# Patient Record
Sex: FEMALE | Race: WHITE | Employment: OTHER
[De-identification: names, ages, dates, MRNs, and addresses within clinical notes are randomized per-mention and may not be internally consistent; named-entity substitution may affect disease eponyms.]

---

## 2017-01-18 DIAGNOSIS — D50.9 IRON DEFICIENCY ANEMIA, UNSPECIFIED IRON DEFICIENCY ANEMIA TYPE: ICD-10-CM

## 2017-01-18 DIAGNOSIS — I10 ESSENTIAL HYPERTENSION: ICD-10-CM

## 2017-01-18 DIAGNOSIS — N18.30 CKD (CHRONIC KIDNEY DISEASE) STAGE 3, GFR 30-59 ML/MIN (HCC): ICD-10-CM

## 2017-01-18 LAB
BASOPHILS ABSOLUTE: ABNORMAL /ΜL
BASOPHILS RELATIVE PERCENT: ABNORMAL %
BUN BLDV-MCNC: 25 MG/DL
CALCIUM SERPL-MCNC: 10.1 MG/DL
CHLORIDE BLD-SCNC: 104 MMOL/L
CO2: 21 MMOL/L
CREAT SERPL-MCNC: 1.16 MG/DL
EOSINOPHILS ABSOLUTE: ABNORMAL /ΜL
EOSINOPHILS RELATIVE PERCENT: ABNORMAL %
GFR CALCULATED: NORMAL
GLUCOSE BLD-MCNC: 143 MG/DL
HCT VFR BLD CALC: 31.6 % (ref 36–46)
HEMOGLOBIN: 10 G/DL (ref 12–16)
LYMPHOCYTES ABSOLUTE: ABNORMAL /ΜL
LYMPHOCYTES RELATIVE PERCENT: ABNORMAL %
MCH RBC QN AUTO: 26.5 PG
MCHC RBC AUTO-ENTMCNC: 31.6 G/DL
MCV RBC AUTO: 84 FL
MONOCYTES ABSOLUTE: ABNORMAL /ΜL
MONOCYTES RELATIVE PERCENT: ABNORMAL %
NEUTROPHILS ABSOLUTE: ABNORMAL /ΜL
NEUTROPHILS RELATIVE PERCENT: ABNORMAL %
PLATELET # BLD: 487 K/ΜL
PMV BLD AUTO: 7.9 FL
POTASSIUM SERPL-SCNC: 4.5 MMOL/L
RBC # BLD: ABNORMAL 10^6/ΜL
SODIUM BLD-SCNC: 138 MMOL/L
WBC # BLD: 7.9 10^3/ML

## 2017-01-19 ENCOUNTER — TELEPHONE (OUTPATIENT)
Facility: CLINIC | Age: 82
End: 2017-01-19

## 2017-01-19 ENCOUNTER — OFFICE VISIT (OUTPATIENT)
Facility: CLINIC | Age: 82
End: 2017-01-19

## 2017-01-19 VITALS
SYSTOLIC BLOOD PRESSURE: 120 MMHG | HEART RATE: 88 BPM | BODY MASS INDEX: 37.86 KG/M2 | WEIGHT: 207 LBS | OXYGEN SATURATION: 96 % | RESPIRATION RATE: 20 BRPM | DIASTOLIC BLOOD PRESSURE: 74 MMHG

## 2017-01-19 DIAGNOSIS — B37.31 VAGINAL CANDIDIASIS: ICD-10-CM

## 2017-01-19 DIAGNOSIS — F41.9 ANXIETY: ICD-10-CM

## 2017-01-19 DIAGNOSIS — D50.9 IRON DEFICIENCY ANEMIA, UNSPECIFIED IRON DEFICIENCY ANEMIA TYPE: ICD-10-CM

## 2017-01-19 DIAGNOSIS — E11.8 TYPE 2 DIABETES MELLITUS WITH COMPLICATION, WITHOUT LONG-TERM CURRENT USE OF INSULIN (HCC): ICD-10-CM

## 2017-01-19 DIAGNOSIS — E78.00 PURE HYPERCHOLESTEROLEMIA: ICD-10-CM

## 2017-01-19 DIAGNOSIS — R29.898 WEAKNESS OF LEFT LEG: ICD-10-CM

## 2017-01-19 DIAGNOSIS — N39.0 UTI DUE TO KLEBSIELLA SPECIES: ICD-10-CM

## 2017-01-19 DIAGNOSIS — E03.9 HYPOTHYROIDISM, UNSPECIFIED TYPE: ICD-10-CM

## 2017-01-19 DIAGNOSIS — N18.30 CKD (CHRONIC KIDNEY DISEASE) STAGE 3, GFR 30-59 ML/MIN (HCC): ICD-10-CM

## 2017-01-19 DIAGNOSIS — K21.9 GASTROESOPHAGEAL REFLUX DISEASE WITHOUT ESOPHAGITIS: ICD-10-CM

## 2017-01-19 DIAGNOSIS — K44.9 HIATAL HERNIA: ICD-10-CM

## 2017-01-19 DIAGNOSIS — I10 ESSENTIAL HYPERTENSION: ICD-10-CM

## 2017-01-19 DIAGNOSIS — B96.89 UTI DUE TO KLEBSIELLA SPECIES: ICD-10-CM

## 2017-01-19 DIAGNOSIS — I82.4Y3 DEEP VEIN THROMBOSIS (DVT) OF PROXIMAL VEIN OF BOTH LOWER EXTREMITIES, UNSPECIFIED CHRONICITY (HCC): Primary | ICD-10-CM

## 2017-01-19 LAB
APPEARANCE FLUID: ABNORMAL
BILIRUBIN, POC: ABNORMAL
BLOOD URINE, POC: ABNORMAL
CLARITY, POC: CLEAR
COLOR, POC: YELLOW
GLUCOSE URINE, POC: ABNORMAL
KETONES, POC: ABNORMAL
LEUKOCYTE EST, POC: ABNORMAL
NITRITE, POC: ABNORMAL
PH, POC: 5.5
PROTEIN, POC: 30
SPECIFIC GRAVITY, POC: 1.02
UROBILINOGEN, POC: 0.2

## 2017-01-19 PROCEDURE — 81003 URINALYSIS AUTO W/O SCOPE: CPT | Performed by: FAMILY MEDICINE

## 2017-01-19 PROCEDURE — 99214 OFFICE O/P EST MOD 30 MIN: CPT | Performed by: FAMILY MEDICINE

## 2017-01-19 RX ORDER — FLUCONAZOLE 150 MG/1
TABLET ORAL
Qty: 3 TABLET | Refills: 0 | Status: SHIPPED | OUTPATIENT
Start: 2017-01-19 | End: 2017-01-20

## 2017-01-19 RX ORDER — DULOXETIN HYDROCHLORIDE 60 MG/1
60 CAPSULE, DELAYED RELEASE ORAL DAILY
Qty: 30 CAPSULE | Refills: 3 | Status: SHIPPED | OUTPATIENT
Start: 2017-01-19 | End: 2017-05-30 | Stop reason: SDUPTHER

## 2017-01-19 RX ORDER — LEVOFLOXACIN 500 MG/1
500 TABLET, FILM COATED ORAL DAILY
Qty: 7 TABLET | Refills: 0 | Status: SHIPPED | OUTPATIENT
Start: 2017-01-19 | End: 2017-01-26

## 2017-01-19 RX ORDER — APIXABAN 5 MG/1
1 TABLET, FILM COATED ORAL 2 TIMES DAILY
Refills: 0 | COMMUNITY
Start: 2016-12-14 | End: 2017-01-19 | Stop reason: SDUPTHER

## 2017-01-19 ASSESSMENT — ENCOUNTER SYMPTOMS
SORE THROAT: 0
CONSTIPATION: 0
BACK PAIN: 0
SHORTNESS OF BREATH: 0
ABDOMINAL DISTENTION: 0
CHEST TIGHTNESS: 0
COUGH: 0
RHINORRHEA: 0
VOMITING: 0
DIARRHEA: 0
NAUSEA: 0
ABDOMINAL PAIN: 0

## 2017-01-20 ENCOUNTER — TELEPHONE (OUTPATIENT)
Facility: CLINIC | Age: 82
End: 2017-01-20

## 2017-01-23 DIAGNOSIS — I82.431 DEEP VEIN THROMBOSIS (DVT) OF POPLITEAL VEIN OF RIGHT LOWER EXTREMITY, UNSPECIFIED CHRONICITY (HCC): Primary | ICD-10-CM

## 2017-01-23 DIAGNOSIS — N39.0 URINARY TRACT INFECTION, SITE NOT SPECIFIED: ICD-10-CM

## 2017-01-25 ENCOUNTER — TELEPHONE (OUTPATIENT)
Facility: CLINIC | Age: 82
End: 2017-01-25

## 2017-01-25 RX ORDER — CIPROFLOXACIN 500 MG/1
500 TABLET, FILM COATED ORAL 2 TIMES DAILY
Qty: 14 TABLET | Refills: 0 | Status: SHIPPED | OUTPATIENT
Start: 2017-01-25 | End: 2017-02-01

## 2017-01-31 ENCOUNTER — TELEPHONE (OUTPATIENT)
Facility: CLINIC | Age: 82
End: 2017-01-31

## 2017-01-31 DIAGNOSIS — M54.5 LOW BACK PAIN, UNSPECIFIED BACK PAIN LATERALITY, UNSPECIFIED CHRONICITY, WITH SCIATICA PRESENCE UNSPECIFIED: Primary | ICD-10-CM

## 2017-02-02 ENCOUNTER — TELEPHONE (OUTPATIENT)
Facility: CLINIC | Age: 82
End: 2017-02-02

## 2017-02-02 DIAGNOSIS — M54.5 LOW BACK PAIN, UNSPECIFIED BACK PAIN LATERALITY, UNSPECIFIED CHRONICITY, WITH SCIATICA PRESENCE UNSPECIFIED: ICD-10-CM

## 2017-03-07 DIAGNOSIS — N18.30 CKD (CHRONIC KIDNEY DISEASE) STAGE 3, GFR 30-59 ML/MIN (HCC): ICD-10-CM

## 2017-03-07 DIAGNOSIS — I10 ESSENTIAL HYPERTENSION: ICD-10-CM

## 2017-03-07 DIAGNOSIS — E78.00 PURE HYPERCHOLESTEROLEMIA: ICD-10-CM

## 2017-03-07 DIAGNOSIS — E11.8 TYPE 2 DIABETES MELLITUS WITH COMPLICATION, WITHOUT LONG-TERM CURRENT USE OF INSULIN (HCC): ICD-10-CM

## 2017-03-07 DIAGNOSIS — G47.33 OBSTRUCTIVE SLEEP APNEA SYNDROME: ICD-10-CM

## 2017-03-07 DIAGNOSIS — Z86.73 HISTORY OF CVA (CEREBROVASCULAR ACCIDENT): ICD-10-CM

## 2017-03-07 DIAGNOSIS — E03.9 HYPOTHYROIDISM, UNSPECIFIED TYPE: ICD-10-CM

## 2017-03-07 DIAGNOSIS — D50.9 IRON DEFICIENCY ANEMIA, UNSPECIFIED IRON DEFICIENCY ANEMIA TYPE: ICD-10-CM

## 2017-03-07 LAB
ALBUMIN SERPL-MCNC: 3.7 G/DL
ALP BLD-CCNC: 59 U/L
ALT SERPL-CCNC: 14 U/L
AST SERPL-CCNC: 16 U/L
BASOPHILS ABSOLUTE: ABNORMAL /ΜL
BASOPHILS RELATIVE PERCENT: ABNORMAL %
BILIRUB SERPL-MCNC: 0.2 MG/DL (ref 0.1–1.4)
BUN BLDV-MCNC: 28 MG/DL
CALCIUM SERPL-MCNC: 9.4 MG/DL
CHLORIDE BLD-SCNC: 104 MMOL/L
CHOLESTEROL, TOTAL: 150 MG/DL
CHOLESTEROL/HDL RATIO: 2.9
CO2: 25 MMOL/L
CREAT SERPL-MCNC: 1.04 MG/DL
EOSINOPHILS ABSOLUTE: ABNORMAL /ΜL
EOSINOPHILS RELATIVE PERCENT: ABNORMAL %
GFR CALCULATED: 51
GLUCOSE BLD-MCNC: 124 MG/DL
HBA1C MFR BLD: 6.4 %
HCT VFR BLD CALC: 35.2 % (ref 36–46)
HDLC SERPL-MCNC: 52 MG/DL (ref 35–70)
HEMOGLOBIN: 11.2 G/DL (ref 12–16)
LDL CHOLESTEROL CALCULATED: 75 MG/DL (ref 0–160)
LYMPHOCYTES ABSOLUTE: ABNORMAL /ΜL
LYMPHOCYTES RELATIVE PERCENT: ABNORMAL %
MCH RBC QN AUTO: 27.4 PG
MCHC RBC AUTO-ENTMCNC: 31.8 G/DL
MCV RBC AUTO: 86 FL
MONOCYTES ABSOLUTE: ABNORMAL /ΜL
MONOCYTES RELATIVE PERCENT: ABNORMAL %
NEUTROPHILS ABSOLUTE: ABNORMAL /ΜL
NEUTROPHILS RELATIVE PERCENT: ABNORMAL %
PLATELET # BLD: 338 K/ΜL
PMV BLD AUTO: 7.9 FL
POTASSIUM SERPL-SCNC: 3.8 MMOL/L
RBC # BLD: 4.09 10^6/ΜL
SODIUM BLD-SCNC: 138 MMOL/L
T4 FREE: 0.7
TOTAL PROTEIN: 7.4
TRIGL SERPL-MCNC: 115 MG/DL
TSH SERPL DL<=0.05 MIU/L-ACNC: 1.46 UIU/ML
VLDLC SERPL CALC-MCNC: 23 MG/DL
WBC # BLD: 7.8 10^3/ML

## 2017-05-09 ENCOUNTER — PROCEDURE VISIT (OUTPATIENT)
Dept: PODIATRY | Age: 82
End: 2017-05-09
Payer: COMMERCIAL

## 2017-05-09 VITALS
WEIGHT: 200 LBS | HEART RATE: 72 BPM | HEIGHT: 62 IN | SYSTOLIC BLOOD PRESSURE: 131 MMHG | BODY MASS INDEX: 36.8 KG/M2 | DIASTOLIC BLOOD PRESSURE: 70 MMHG

## 2017-05-09 DIAGNOSIS — E11.42 TYPE 2 DIABETES MELLITUS WITH DIABETIC POLYNEUROPATHY, WITHOUT LONG-TERM CURRENT USE OF INSULIN (HCC): ICD-10-CM

## 2017-05-09 DIAGNOSIS — B35.1 DERMATOPHYTOSIS OF NAIL: Primary | ICD-10-CM

## 2017-05-09 PROCEDURE — 11721 DEBRIDE NAIL 6 OR MORE: CPT | Performed by: PODIATRIST

## 2017-08-23 ENCOUNTER — PROCEDURE VISIT (OUTPATIENT)
Dept: PODIATRY | Age: 82
End: 2017-08-23
Payer: COMMERCIAL

## 2017-08-23 VITALS
HEIGHT: 63 IN | HEART RATE: 83 BPM | WEIGHT: 200 LBS | DIASTOLIC BLOOD PRESSURE: 87 MMHG | BODY MASS INDEX: 35.44 KG/M2 | SYSTOLIC BLOOD PRESSURE: 134 MMHG

## 2017-08-23 DIAGNOSIS — E11.42 TYPE 2 DIABETES MELLITUS WITH DIABETIC POLYNEUROPATHY, WITHOUT LONG-TERM CURRENT USE OF INSULIN (HCC): ICD-10-CM

## 2017-08-23 DIAGNOSIS — B35.1 DERMATOPHYTOSIS OF NAIL: Primary | ICD-10-CM

## 2017-08-23 PROCEDURE — 11721 DEBRIDE NAIL 6 OR MORE: CPT | Performed by: PODIATRIST

## 2017-09-27 PROBLEM — I87.009 POST-THROMBOTIC SYNDROME: Status: ACTIVE | Noted: 2017-09-27

## 2017-11-28 ENCOUNTER — PROCEDURE VISIT (OUTPATIENT)
Dept: PODIATRY | Age: 82
End: 2017-11-28
Payer: COMMERCIAL

## 2017-11-28 VITALS
SYSTOLIC BLOOD PRESSURE: 130 MMHG | DIASTOLIC BLOOD PRESSURE: 65 MMHG | HEIGHT: 62 IN | HEART RATE: 69 BPM | BODY MASS INDEX: 40.3 KG/M2 | WEIGHT: 219 LBS

## 2017-11-28 DIAGNOSIS — E11.42 TYPE 2 DIABETES MELLITUS WITH DIABETIC POLYNEUROPATHY, WITHOUT LONG-TERM CURRENT USE OF INSULIN (HCC): ICD-10-CM

## 2017-11-28 DIAGNOSIS — B35.1 DERMATOPHYTOSIS OF NAIL: Primary | ICD-10-CM

## 2017-11-28 PROCEDURE — 11721 DEBRIDE NAIL 6 OR MORE: CPT | Performed by: PODIATRIST

## 2017-11-28 NOTE — PROGRESS NOTES
capsule Take 1 capsule by mouth daily 30 capsule 3    pantoprazole (PROTONIX) 40 MG tablet take 1 tablet by mouth once daily 30 tablet 11    atorvastatin (LIPITOR) 10 MG tablet take 1 tablet by mouth once daily 30 tablet 11    potassium chloride (KLOR-CON M) 20 MEQ extended release tablet take 1 tablet by mouth once daily 30 tablet 11    lisinopril (PRINIVIL;ZESTRIL) 5 MG tablet take 1 tablet by mouth once daily 30 tablet 3    levothyroxine (SYNTHROID) 50 MCG tablet take 1 tablet by mouth once daily 90 tablet 3    diltiazem (CARTIA XT) 240 MG extended release capsule Take 1 capsule by mouth daily 90 capsule 3    LORazepam (ATIVAN) 0.5 MG tablet take 1 tablet by mouth twice a day if needed 60 tablet 0    trimethoprim (TRIMPEX) 100 MG tablet Take 1 tablet by mouth daily 30 tablet 11    apixaban (ELIQUIS) 5 MG TABS tablet Take 1 tablet by mouth 2 times daily 60 tablet 5    metFORMIN (GLUCOPHAGE) 500 MG tablet take 1 tablet by mouth every morning 30 tablet 11    budesonide-formoterol (SYMBICORT) 80-4.5 MCG/ACT AERO Inhale 2 puffs into the lungs 2 times daily 1 Inhaler 1    Calcium Carb-Cholecalciferol (CALCIUM 600 + D PO) Take 1 tablet by mouth 2 times daily      latanoprost (XALATAN) 0.005 % ophthalmic solution       cetirizine (ZYRTEC) 10 MG tablet Take 10 mg by mouth daily as needed.  Multiple Vitamin (THERA) TABS Take 1 tablet by mouth daily.  aspirin 81 MG EC tablet Take 81 mg by mouth daily.  doxycycline hyclate (VIBRAMYCIN) 100 MG capsule Take 1 capsule by mouth daily Per derm  0     No current facility-administered medications on file prior to visit. ROS: No N/V/F/C, SOB, CP  Objective:  General: AAO x 3 in NAD.     Derm  Toenail Description  Sites of Onychomycosis Involvement (Check affected area)  [x] [x] [x] [x] [x] [x] [x] [x] [x] [x]  5 4 3 2 1 1 2 3 4 5                          Right

## 2018-01-04 PROBLEM — Z86.718 HISTORY OF DVT (DEEP VEIN THROMBOSIS): Status: ACTIVE | Noted: 2018-01-04

## 2018-01-04 PROBLEM — I82.4Y3 DEEP VEIN THROMBOSIS (DVT) OF PROXIMAL VEIN OF BOTH LOWER EXTREMITIES (HCC): Status: RESOLVED | Noted: 2017-01-19 | Resolved: 2018-01-04

## 2018-02-27 ENCOUNTER — PROCEDURE VISIT (OUTPATIENT)
Dept: PODIATRY | Age: 83
End: 2018-02-27
Payer: COMMERCIAL

## 2018-02-27 VITALS
HEART RATE: 70 BPM | WEIGHT: 200 LBS | HEIGHT: 63 IN | BODY MASS INDEX: 35.44 KG/M2 | SYSTOLIC BLOOD PRESSURE: 113 MMHG | DIASTOLIC BLOOD PRESSURE: 64 MMHG

## 2018-02-27 DIAGNOSIS — E11.42 TYPE 2 DIABETES MELLITUS WITH DIABETIC POLYNEUROPATHY, WITHOUT LONG-TERM CURRENT USE OF INSULIN (HCC): ICD-10-CM

## 2018-02-27 DIAGNOSIS — B35.1 DERMATOPHYTOSIS OF NAIL: Primary | ICD-10-CM

## 2018-02-27 PROCEDURE — 11721 DEBRIDE NAIL 6 OR MORE: CPT | Performed by: PODIATRIST

## 2018-02-27 NOTE — PROGRESS NOTES
(LIPITOR) 10 MG tablet take 1 tablet by mouth once daily 30 tablet 11    potassium chloride (KLOR-CON M) 20 MEQ extended release tablet take 1 tablet by mouth once daily 30 tablet 11    levothyroxine (SYNTHROID) 50 MCG tablet take 1 tablet by mouth once daily 90 tablet 3    diltiazem (CARTIA XT) 240 MG extended release capsule Take 1 capsule by mouth daily 90 capsule 3    trimethoprim (TRIMPEX) 100 MG tablet Take 1 tablet by mouth daily 30 tablet 11    budesonide-formoterol (SYMBICORT) 80-4.5 MCG/ACT AERO Inhale 2 puffs into the lungs 2 times daily 1 Inhaler 1    Calcium Carb-Cholecalciferol (CALCIUM 600 + D PO) Take 1 tablet by mouth 2 times daily      latanoprost (XALATAN) 0.005 % ophthalmic solution       cetirizine (ZYRTEC) 10 MG tablet Take 10 mg by mouth daily as needed.  Multiple Vitamin (THERA) TABS Take 1 tablet by mouth daily.  aspirin 81 MG EC tablet Take 81 mg by mouth daily. No current facility-administered medications on file prior to visit. ROS: No N/V/F/C, SOB, CP  Objective:  General: AAO x 3 in NAD.     Derm  Toenail Description  Sites of Onychomycosis Involvement (Check affected area)  [x] [x] [x] [x] [x] [x] [x] [x] [x] [x]  5 4 3 2 1 1 2 3 4 5                          Right                                        Left    Thickness  [x] [x] [x] [x] [x] [x] [x] [x] [x] [x]  5 4 3 2 1 1 2 3 4 5                         Right                                        Left    Dystrophic Changes   [x] [x] [x] [x] [x] [x] [x] [x] [x] [x]  5 4 3 2 1 1 2 3 4 5                         Right                                        Left    Color   [x] [x] [x] [x] [x] [x] [x] [x] [x] [x]  5 4 3 2 1 1 2 3 4 5                          Right                                        Left    Incurvation/Ingrowin   [] [] [] [] [] [] [] [] [] []  5 4 3 2 1 1 2 3 4 5                         Right                                        Left    Inflammation/Pain

## 2018-05-23 ENCOUNTER — OFFICE VISIT (OUTPATIENT)
Dept: PODIATRY | Age: 83
End: 2018-05-23
Payer: COMMERCIAL

## 2018-05-23 VITALS
WEIGHT: 210 LBS | SYSTOLIC BLOOD PRESSURE: 139 MMHG | HEIGHT: 62 IN | HEART RATE: 73 BPM | DIASTOLIC BLOOD PRESSURE: 70 MMHG | BODY MASS INDEX: 38.64 KG/M2

## 2018-05-23 DIAGNOSIS — E11.42 TYPE 2 DIABETES MELLITUS WITH DIABETIC POLYNEUROPATHY, WITHOUT LONG-TERM CURRENT USE OF INSULIN (HCC): ICD-10-CM

## 2018-05-23 DIAGNOSIS — B35.1 DERMATOPHYTOSIS OF NAIL: Primary | ICD-10-CM

## 2018-05-23 PROCEDURE — 11721 DEBRIDE NAIL 6 OR MORE: CPT | Performed by: PODIATRIST

## 2018-07-05 LAB
AVERAGE GLUCOSE: 148
BASOPHILS ABSOLUTE: NORMAL /ΜL
BASOPHILS RELATIVE PERCENT: NORMAL %
BUN BLDV-MCNC: 31 MG/DL
CALCIUM SERPL-MCNC: 9.3 MG/DL
CHLORIDE BLD-SCNC: 108 MMOL/L
CO2: 22 MMOL/L
CREAT SERPL-MCNC: 1.15 MG/DL
EOSINOPHILS ABSOLUTE: NORMAL /ΜL
EOSINOPHILS RELATIVE PERCENT: NORMAL %
GFR CALCULATED: 45
GLUCOSE BLD-MCNC: 144 MG/DL
HBA1C MFR BLD: 6.8 %
HCT VFR BLD CALC: 36.3 % (ref 36–46)
HEMOGLOBIN: 12.1 G/DL (ref 12–16)
LYMPHOCYTES ABSOLUTE: NORMAL /ΜL
LYMPHOCYTES RELATIVE PERCENT: NORMAL %
MCH RBC QN AUTO: NORMAL PG
MCHC RBC AUTO-ENTMCNC: NORMAL G/DL
MCV RBC AUTO: NORMAL FL
MONOCYTES ABSOLUTE: NORMAL /ΜL
MONOCYTES RELATIVE PERCENT: NORMAL %
NEUTROPHILS ABSOLUTE: NORMAL /ΜL
NEUTROPHILS RELATIVE PERCENT: NORMAL %
PLATELET # BLD: 279 K/ΜL
PMV BLD AUTO: NORMAL FL
POTASSIUM SERPL-SCNC: 4.7 MMOL/L
RBC # BLD: NORMAL 10^6/ΜL
SODIUM BLD-SCNC: 140 MMOL/L
WBC # BLD: 7.1 10^3/ML

## 2018-07-09 ENCOUNTER — OFFICE VISIT (OUTPATIENT)
Dept: FAMILY MEDICINE CLINIC | Age: 83
End: 2018-07-09
Payer: COMMERCIAL

## 2018-07-09 VITALS
SYSTOLIC BLOOD PRESSURE: 126 MMHG | WEIGHT: 227 LBS | HEART RATE: 80 BPM | RESPIRATION RATE: 16 BRPM | TEMPERATURE: 98.8 F | DIASTOLIC BLOOD PRESSURE: 74 MMHG | OXYGEN SATURATION: 96 % | BODY MASS INDEX: 41.52 KG/M2

## 2018-07-09 DIAGNOSIS — E11.8 TYPE 2 DIABETES MELLITUS WITH COMPLICATION, WITHOUT LONG-TERM CURRENT USE OF INSULIN (HCC): ICD-10-CM

## 2018-07-09 DIAGNOSIS — D50.9 IRON DEFICIENCY ANEMIA, UNSPECIFIED IRON DEFICIENCY ANEMIA TYPE: ICD-10-CM

## 2018-07-09 DIAGNOSIS — R29.898 WEAKNESS OF LEFT LEG: ICD-10-CM

## 2018-07-09 DIAGNOSIS — Z86.73 HISTORY OF CVA (CEREBROVASCULAR ACCIDENT): ICD-10-CM

## 2018-07-09 DIAGNOSIS — N39.0 RECURRENT UTI: ICD-10-CM

## 2018-07-09 DIAGNOSIS — E78.00 PURE HYPERCHOLESTEROLEMIA: ICD-10-CM

## 2018-07-09 DIAGNOSIS — N18.30 CKD (CHRONIC KIDNEY DISEASE) STAGE 3, GFR 30-59 ML/MIN (HCC): ICD-10-CM

## 2018-07-09 DIAGNOSIS — G47.33 OBSTRUCTIVE SLEEP APNEA SYNDROME: ICD-10-CM

## 2018-07-09 DIAGNOSIS — F41.9 ANXIETY: ICD-10-CM

## 2018-07-09 DIAGNOSIS — E03.9 HYPOTHYROIDISM, UNSPECIFIED TYPE: ICD-10-CM

## 2018-07-09 DIAGNOSIS — N30.20 CYSTITIS, CHRONIC: ICD-10-CM

## 2018-07-09 DIAGNOSIS — J45.20 MILD INTERMITTENT ASTHMA WITHOUT COMPLICATION: ICD-10-CM

## 2018-07-09 DIAGNOSIS — I10 ESSENTIAL HYPERTENSION: Primary | ICD-10-CM

## 2018-07-09 DIAGNOSIS — K21.9 GASTROESOPHAGEAL REFLUX DISEASE WITHOUT ESOPHAGITIS: ICD-10-CM

## 2018-07-09 DIAGNOSIS — I10 ESSENTIAL HYPERTENSION: ICD-10-CM

## 2018-07-09 PROCEDURE — 99214 OFFICE O/P EST MOD 30 MIN: CPT | Performed by: FAMILY MEDICINE

## 2018-07-09 RX ORDER — TRIMETHOPRIM 100 MG/1
100 TABLET ORAL DAILY
Qty: 30 TABLET | Refills: 11 | Status: SHIPPED | OUTPATIENT
Start: 2018-07-09 | End: 2018-09-11 | Stop reason: SDUPTHER

## 2018-07-09 ASSESSMENT — ENCOUNTER SYMPTOMS
COUGH: 0
DIARRHEA: 0
VOMITING: 0
BACK PAIN: 0
SHORTNESS OF BREATH: 0
CHEST TIGHTNESS: 0
RHINORRHEA: 0
ABDOMINAL DISTENTION: 0
ABDOMINAL PAIN: 0
CONSTIPATION: 0
NAUSEA: 0
SORE THROAT: 0

## 2018-07-09 NOTE — LETTER
[facility]  [address] [facility] [address]  Phone: [phone]  Fax: [phone]    Maya Dale MD        July 9, 2018     Patient: Fitz Chan   YOB: 1935   Date of Visit: 7/9/2018       To Whom It May Concern: It is my medical opinion that Josias Prieto requires a disability parking placard for the following reasons:  She cannot walk 200 feet without stopping to rest.  Duration of need: 5 years    DX:History of CVA Z86.73        If you have any questions or concerns, please don't hesitate to call.     Sincerely,        Maya Dale MD

## 2018-07-09 NOTE — PROGRESS NOTES
HYPERTENSION visit     BP Readings from Last 3 Encounters:   06/13/18 114/76   05/23/18 139/70   04/02/18 136/84       LDL Calculated (mg/dL)   Date Value   03/27/2018 65     LDL Cholesterol (mg/dL)   Date Value   07/08/2014 54     HDL (mg/dL)   Date Value   03/27/2018 58     BUN (mg/dL)   Date Value   07/05/2018 31     CREATININE (no units)   Date Value   07/05/2018 1.15     Glucose (mg/dL)   Date Value   07/05/2018 144              Have you changed or started any medications since your last visit including any over-the-counter medicines, vitamins, or herbal medicines? yes - see med list   Have you stopped taking any of your medications? Is so, why? -  no  Are you having any side effects from any of your medications? - no  How often do you miss doses of your medication? rare      Have you seen any other physician or provider since your last visit?  no   Have you had any other diagnostic tests since your last visit? yes - labs   Have you been seen in the emergency room and/or had an admission in a hospital since we last saw you?  no   Have you had your routine dental cleaning in the past 6 months?  yes -      Do you have an active MyChart account? If no, what is the barrier?   Yes    Patient Care Team:  Kristal Skinner MD as PCP - General (Family Medicine)  Chary Murphy MD as Consulting Physician (Gastroenterology)  Amalia Ramesh DPM as Consulting Physician (Podiatry)  James Martinez OD as Consulting Physician (Optometry)  Yuliya Leon MD as Consulting Physician (Urology)  Jessica Rinaldi MD as Surgeon (General Surgery)    Medical History Review  Past Medical, Family, and Social History reviewed and does contribute to the patient presenting condition    Health Maintenance   Topic Date Due    Shingles Vaccine (1 of 2 - 2 Dose Series) 10/02/2018 (Originally 6/18/1987)    DTaP/Tdap/Td vaccine (1 - Tdap) 11/29/2021 (Originally 6[birth year]18/1954)    Flu vaccine (1) 09/01/2018    TSH testing  03/27/2019    Potassium
Hemoglobin A1C     Standing Status:   Future     Standing Expiration Date:   7/9/2019    Lipid Panel     Standing Status:   Future     Standing Expiration Date:   7/9/2019     Order Specific Question:   Is Patient Fasting?/# of Hours     Answer:   8-10 Hours    T4, Free     Standing Status:   Future     Standing Expiration Date:   7/9/2019    TSH without Reflex     Standing Status:   Future     Standing Expiration Date:   7/9/2019      Orders Placed This Encounter   Medications    trimethoprim (TRIMPEX) 100 MG tablet     Sig: Take 1 tablet by mouth daily     Dispense:  30 tablet     Refill:  11     Will cont with current treatment as pt is currently stable on current treatment    Continue with ADA diet, current diabetic meds, and monitoring BS's as instructed    Cont with daily foot exams and yearly eye exams    Will cont with isinopril for renal protection    Will cont with low fat/chol diet and Lipitor as ordered    Increase fluids throughout the day    Will get her back on the daily Trimethoprim 100mg daily for prophylaxis    Rest of systems unchanged, continue current treatments. Medications, labs, diagnostic studies, consultations and follow-up as documented in this encounter.  Rest of systems unchanged, continue current treatments

## 2018-07-11 DIAGNOSIS — E03.9 HYPOTHYROIDISM, UNSPECIFIED TYPE: ICD-10-CM

## 2018-07-11 RX ORDER — LEVOTHYROXINE SODIUM 0.05 MG/1
TABLET ORAL
Qty: 90 TABLET | Refills: 3 | Status: SHIPPED | OUTPATIENT
Start: 2018-07-11 | End: 2019-06-28 | Stop reason: SDUPTHER

## 2018-07-23 DIAGNOSIS — F41.9 ANXIETY: Primary | ICD-10-CM

## 2018-07-23 RX ORDER — LORAZEPAM 0.5 MG/1
0.5 TABLET ORAL 2 TIMES DAILY PRN
Qty: 60 TABLET | Refills: 0 | Status: SHIPPED | OUTPATIENT
Start: 2018-07-23 | End: 2019-04-08 | Stop reason: SDUPTHER

## 2018-07-23 RX ORDER — LORAZEPAM 0.5 MG/1
0.5 TABLET ORAL 2 TIMES DAILY PRN
COMMUNITY
End: 2018-07-23 | Stop reason: SDUPTHER

## 2018-08-07 DIAGNOSIS — E78.00 PURE HYPERCHOLESTEROLEMIA: Primary | ICD-10-CM

## 2018-08-08 RX ORDER — ATORVASTATIN CALCIUM 10 MG/1
TABLET, FILM COATED ORAL
Qty: 30 TABLET | Refills: 5 | Status: SHIPPED | OUTPATIENT
Start: 2018-08-08 | End: 2019-02-01 | Stop reason: SDUPTHER

## 2018-08-08 NOTE — TELEPHONE ENCOUNTER
Telephone Refill Encounter    1. Medical history, allergies, and current medication list reviewed. 2. Name and location verified of patient. 3. Verbal consent obtained to refill medication through a remote evaluation. 4. Dx:  1. Pure hypercholesterolemia  -Stable: Medication re-filled as needed, con't medications as prescribed, con't current tx plan  - atorvastatin (LIPITOR) 10 MG tablet; take 1 tablet by mouth once daily  Dispense: 30 tablet; Refill: 5    5. Follow up with PCP as directed. 6. Chart available to PCP through [employer].     Past Medical History:   Diagnosis Date    Diverticulosis     Hyperlipidemia     Hypertension     Hypokalemia     Iron deficiency anemia     Menopause     s/p hysterectomy    Osteoarthritis     bilat hands, hips, knees    S/P partial colectomy 2/2002    Stroke ([facility] [address]     Thrombocytosis ([facility] [address]     Type II or unspecified type diabetes mellitus without mention of complication, not stated as uncontrolled 02/2007    Unspecified sleep apnea 08/2004    uses bipap    UTI (lower urinary tract infection)     re-curring     CURRENT MEDS W/ ASSOC DIAG         Start Date End Date     aspirin 81 MG EC tablet  --  --     Associated Diagnoses:   --     atorvastatin (LIPITOR) 10 MG tablet  08/08/18  --     take 1 tablet by mouth once daily     Associated Diagnoses:   Pure hypercholesterolemia     budesonide-formoterol (SYMBICORT) 80-4.5 MCG/ACT AERO  06/16/16  --     Inhale 2 puffs into the lungs 2 times daily     Associated Diagnoses:   --     Calcium Carb-Cholecalciferol (CALCIUM 600 + D PO)  --  --     Associated Diagnoses:   --     CARTIA  MG extended release capsule  06/14/18  --     take 1 capsule by mouth once daily     Associated Diagnoses:   Essential hypertension     cetirizine (ZYRTEC) 10 MG tablet  --  --     Associated Diagnoses:   --     dipyridamole (PERSANTINE) 50 MG tablet  01/04/18  --     take 2 tablets by mouth twice a day     Associated Diagnoses:   -- DULoxetine (CYMBALTA) 60 MG extended release capsule  01/30/18  --     take 1 capsule by mouth once daily     Associated Diagnoses:   --     latanoprost (XALATAN) 0.005 % ophthalmic solution  07/01/15  --     Associated Diagnoses:   --     levothyroxine (SYNTHROID) 50 MCG tablet  07/11/18  --     take 1 tablet by mouth once daily     Associated Diagnoses:   Hypothyroidism, unspecified type     lisinopril (PRINIVIL;ZESTRIL) 5 MG tablet  12/11/17  --     take 1 tablet by mouth once daily     Associated Diagnoses:   Essential hypertension     LORazepam (ATIVAN) 0.5 MG tablet  07/23/18 08/22/18     Take 1 tablet by mouth 2 times daily as needed for Anxiety for up to 30 days. .     Associated Diagnoses:   Anxiety     metFORMIN (GLUCOPHAGE) 500 MG tablet  01/22/18  --     take 1 tablet by mouth every morning     Associated Diagnoses:   --     Multiple Vitamin (THERA) TABS  --  --     Associated Diagnoses:   --     polyethylene glycol (GLYCOLAX) packet  06/08/18  --     Associated Diagnoses:   --     trimethoprim (TRIMPEX) 100 MG tablet  07/09/18  --     Take 1 tablet by mouth daily     Associated Diagnoses:   Recurrent UTI

## 2018-08-22 ENCOUNTER — OFFICE VISIT (OUTPATIENT)
Dept: PODIATRY | Age: 83
End: 2018-08-22
Payer: COMMERCIAL

## 2018-08-22 VITALS
SYSTOLIC BLOOD PRESSURE: 133 MMHG | BODY MASS INDEX: 38.64 KG/M2 | DIASTOLIC BLOOD PRESSURE: 88 MMHG | WEIGHT: 210 LBS | HEART RATE: 78 BPM | HEIGHT: 62 IN

## 2018-08-22 DIAGNOSIS — B35.1 DERMATOPHYTOSIS OF NAIL: Primary | ICD-10-CM

## 2018-08-22 DIAGNOSIS — E11.42 TYPE 2 DIABETES MELLITUS WITH DIABETIC POLYNEUROPATHY, WITHOUT LONG-TERM CURRENT USE OF INSULIN (HCC): ICD-10-CM

## 2018-08-22 PROCEDURE — 99999 PR OFFICE/OUTPT VISIT,PROCEDURE ONLY: CPT | Performed by: PODIATRIST

## 2018-08-22 PROCEDURE — 11721 DEBRIDE NAIL 6 OR MORE: CPT | Performed by: PODIATRIST

## 2018-08-22 NOTE — PROGRESS NOTES
[facility]  Return Patient    Chief Complaint   Patient presents with    Nail Problem     nail trim/last saw Jodie Regi 7/9/18    Other     last blood sugar 134       Subjective: Amarjit Andrews comes to clinic for Nail Problem (nail trim/last saw Jodie Regi 7/9/18) and Other (last blood sugar 134)  . Pt. Is a diabetic and states that she has been doing well. Pt currently has complaint of thickened, elongated nails that they cannot manage by themselves. Pt's primary care physician is Lear Angelucci, MD   Pt's last blood sugar was 134. Lab Results   Component Value Date    LABA1C 6.8 07/05/2018      Complains of numbness in the feet bilat. Past Medical History:   Diagnosis Date    Diverticulosis     Hyperlipidemia     Hypertension     Hypokalemia     Iron deficiency anemia     Menopause     s/p hysterectomy    Osteoarthritis     bilat hands, hips, knees    S/P partial colectomy 2/2002    Stroke ([facility] [address]     Thrombocytosis ([facility] [address]     Type II or unspecified type diabetes mellitus without mention of complication, not stated as uncontrolled 02/2007    Unspecified sleep apnea 08/2004    uses bipap    UTI (lower urinary tract infection)     re-curring       Allergies   Allergen Reactions    Pcn [Penicillins] Shortness Of Breath    Demerol     Effexor [Venlafaxine] Other (See Comments)     Hallucinations     Macrobid [Nitrofurantoin Monohydrate Macrocrystals]      Wheezing, tight chest     Current Outpatient Prescriptions on File Prior to Visit   Medication Sig Dispense Refill    atorvastatin (LIPITOR) 10 MG tablet take 1 tablet by mouth once daily 30 tablet 5    LORazepam (ATIVAN) 0.5 MG tablet Take 1 tablet by mouth 2 times daily as needed for Anxiety for up to 30 days. . 60 tablet 0    levothyroxine (SYNTHROID) 50 MCG tablet take 1 tablet by mouth once daily 90 tablet 3    trimethoprim (TRIMPEX) 100 MG tablet Take 1 tablet by mouth daily 30 tablet 11    CARTIA  MG skin inspection, vascular exam including assessment of pedal pulses, orthopedic exam for deformities, and shoe inspection. Increased risk factors noted on the diabetic foot exam include decreased sensory exam and peripheral neuropathy. Shoegear inspected and found to be appropriate size and wear.         Electronically signed by Bruno Ahmadi DPM on 8/22/2018 at 1:56 PM

## 2018-09-11 ENCOUNTER — OFFICE VISIT (OUTPATIENT)
Dept: FAMILY MEDICINE CLINIC | Age: 83
End: 2018-09-11
Payer: COMMERCIAL

## 2018-09-11 VITALS
HEART RATE: 70 BPM | DIASTOLIC BLOOD PRESSURE: 80 MMHG | RESPIRATION RATE: 16 BRPM | TEMPERATURE: 98.7 F | BODY MASS INDEX: 40.79 KG/M2 | OXYGEN SATURATION: 95 % | SYSTOLIC BLOOD PRESSURE: 112 MMHG | WEIGHT: 223 LBS

## 2018-09-11 DIAGNOSIS — J05.10 ACUTE EPIGLOTTITIS WITHOUT AIRWAY OBSTRUCTION: Primary | ICD-10-CM

## 2018-09-11 DIAGNOSIS — Z23 NEED FOR PROPHYLACTIC VACCINATION AND INOCULATION AGAINST INFLUENZA: ICD-10-CM

## 2018-09-11 DIAGNOSIS — I10 ESSENTIAL HYPERTENSION: ICD-10-CM

## 2018-09-11 DIAGNOSIS — N39.41 URGE INCONTINENCE: ICD-10-CM

## 2018-09-11 DIAGNOSIS — N39.0 RECURRENT UTI: ICD-10-CM

## 2018-09-11 PROCEDURE — G0008 ADMIN INFLUENZA VIRUS VAC: HCPCS | Performed by: FAMILY MEDICINE

## 2018-09-11 PROCEDURE — 1111F DSCHRG MED/CURRENT MED MERGE: CPT | Performed by: FAMILY MEDICINE

## 2018-09-11 PROCEDURE — 90662 IIV NO PRSV INCREASED AG IM: CPT | Performed by: FAMILY MEDICINE

## 2018-09-11 PROCEDURE — 99214 OFFICE O/P EST MOD 30 MIN: CPT | Performed by: FAMILY MEDICINE

## 2018-09-11 RX ORDER — TOLTERODINE 4 MG/1
4 CAPSULE, EXTENDED RELEASE ORAL DAILY
Qty: 30 CAPSULE | Refills: 3 | Status: SHIPPED | OUTPATIENT
Start: 2018-09-11 | End: 2018-10-01 | Stop reason: SINTOL

## 2018-09-11 RX ORDER — TRIMETHOPRIM 100 MG/1
100 TABLET ORAL DAILY
Qty: 30 TABLET | Refills: 11 | Status: SHIPPED | OUTPATIENT
Start: 2018-09-11 | End: 2019-04-08 | Stop reason: SDUPTHER

## 2018-09-11 NOTE — PROGRESS NOTES
aspirin 81 MG EC tablet  Take 81 mg by mouth daily. atorvastatin (LIPITOR) 10 MG tablet  take 1 tablet by mouth once daily             budesonide-formoterol (SYMBICORT) 80-4.5 MCG/ACT AERO  Inhale 2 puffs into the lungs 2 times daily             Calcium Carb-Cholecalciferol (CALCIUM 600 + D PO)  Take 1 tablet by mouth 2 times daily             CARTIA  MG extended release capsule  take 1 capsule by mouth once daily             cetirizine (ZYRTEC) 10 MG tablet  Take 10 mg by mouth daily as needed. dipyridamole (PERSANTINE) 50 MG tablet  take 2 tablets by mouth twice a day             DULoxetine (CYMBALTA) 60 MG extended release capsule  take 1 capsule by mouth once daily             latanoprost (XALATAN) 0.005 % ophthalmic solution               levothyroxine (SYNTHROID) 50 MCG tablet  take 1 tablet by mouth once daily             metFORMIN (GLUCOPHAGE) 500 MG tablet  take 1 tablet by mouth every morning             Multiple Vitamin (THERA) TABS  Take 1 tablet by mouth daily.                polyethylene glycol (GLYCOLAX) packet  Take 17 g by mouth daily             tolterodine (DETROL LA) 4 MG extended release capsule  Take 1 capsule by mouth daily             trimethoprim (TRIMPEX) 100 MG tablet  Take 1 tablet by mouth daily                   Medications marked \"taking\" at this time  Outpatient Prescriptions Marked as Taking for the 9/11/18 encounter (Office Visit) with Amparo Staples MD   Medication Sig Dispense Refill    trimethoprim (TRIMPEX) 100 MG tablet Take 1 tablet by mouth daily 30 tablet 11    tolterodine (DETROL LA) 4 MG extended release capsule Take 1 capsule by mouth daily 30 capsule 3    atorvastatin (LIPITOR) 10 MG tablet take 1 tablet by mouth once daily 30 tablet 5    levothyroxine (SYNTHROID) 50 MCG tablet take 1 tablet by mouth once daily 90 tablet 3    CARTIA  MG extended release capsule take 1 capsule by mouth once daily 90 capsule 3    Temp: 98.7 °F (37.1 °C)   SpO2: 95%   Weight: 223 lb (101.2 kg)     Body mass index is 40.79 kg/m². Wt Readings from Last 3 Encounters:   09/11/18 223 lb (101.2 kg)   08/22/18 210 lb (95.3 kg)   07/09/18 227 lb (103 kg)     BP Readings from Last 3 Encounters:   09/11/18 112/80   08/22/18 133/88   07/09/18 126/74        Physical Exam:  General Appearance: alert and oriented to person, place and time, well-developed and well-nourished, in no acute distress  Pulmonary/Chest: clear to auscultation bilaterally- no wheezes, rales or rhonchi, normal air movement, no respiratory distress  Cardiovascular: normal rate, regular rhythm and no murmurs  Abdomen: soft, non-tender, non-distended, normal bowel sounds, no masses or organomegaly  Extremities: no cyanosis, no clubbing, no edema and Michelle's sign negative bilaterally    Assessment/Plan:   Diagnosis Orders   1. Acute epiglottitis without airway obstruction  ID DISCHARGE MEDS RECONCILED W/ CURRENT OUTPATIENT MED LIST    resolved s/p ATB's and steroids   2. Need for prophylactic vaccination and inoculation against influenza  INFLUENZA, HIGH DOSE, 65 YRS +, IM, PF, PREFILL SYR, 0.5ML (FLUZONE HD)   3. Urge incontinence  tolterodine (DETROL LA) 4 MG extended release capsule   4. Recurrent UTI  trimethoprim (TRIMPEX) 100 MG tablet   5.  Essential hypertension         Orders Placed This Encounter   Procedures    INFLUENZA, HIGH DOSE, 65 YRS +, IM, PF, PREFILL SYR, 0.5ML (FLUZONE HD)    ID DISCHARGE MEDS RECONCILED W/ CURRENT OUTPATIENT MED LIST      Orders Placed This Encounter   Medications    trimethoprim (TRIMPEX) 100 MG tablet     Sig: Take 1 tablet by mouth daily     Dispense:  30 tablet     Refill:  11    tolterodine (DETROL LA) 4 MG extended release capsule     Sig: Take 1 capsule by mouth daily     Dispense:  30 capsule     Refill:  3     Will keep her off the Lisinopril and no ARB's as well secondary to the acute epligottitis and added to her allergy

## 2018-09-11 NOTE — PROGRESS NOTES
Visit Information    Have you changed or started any medications since your last visit including any over-the-counter medicines, vitamins, or herbal medicines? yes - see med list   Have you stopped taking any of your medications? Is so, why? -  no  Are you having any side effects from any of your medications? - no    Have you seen any other physician or provider since your last visit? Yes Dr. Mariah Link   Have you had any other diagnostic tests since your last visit? yes - while hospitalized   Have you been seen in the emergency room and/or had an admission in a hospital since we last saw you?  yes - eppiglotitis   Have you had your routine dental cleaning in the past 6 months?  yes -      Do you have an active MyChart account? If no, what is the barrier? Yes    Patient Care Team:  Lear Angelucci, MD as PCP - General (Family Medicine)  Mando Mckeon MD as Consulting Physician (Gastroenterology)  Rosa Douglass DPM as Consulting Physician (Podiatry)  Tabitha Leiva OD as Consulting Physician (Optometry)  Rosa Enamorado MD as Consulting Physician (Urology)  Galilea Altamirano MD as Surgeon (General Surgery)    Medical History Review  Past Medical, Family, and Social History reviewed and does not contribute to the patient presenting condition    Health Maintenance   Topic Date Due    Flu vaccine (1) 09/01/2018    Shingles Vaccine (1 of 2 - 2 Dose Series) 10/02/2018 (Originally 6/18/1987)    DTaP/Tdap/Td vaccine (1 - Tdap) 11/29/2021 (Originally 6/18/1956)    TSH testing  03/27/2019    Potassium monitoring  07/05/2019    Creatinine monitoring  07/05/2019    DEXA (modify frequency per FRAX score)  Addressed    Pneumococcal low/med risk  Completed     Patient/Caregiver verbalize understanding of medications.   Yes

## 2018-09-26 LAB
ALBUMIN SERPL-MCNC: 3.9 G/DL
ALP BLD-CCNC: 47 U/L
ALT SERPL-CCNC: 13 U/L
ANION GAP SERPL CALCULATED.3IONS-SCNC: 16 MMOL/L
AST SERPL-CCNC: 16 U/L
AVERAGE GLUCOSE: 163
BASOPHILS ABSOLUTE: NORMAL /ΜL
BASOPHILS RELATIVE PERCENT: NORMAL %
BILIRUB SERPL-MCNC: 0.4 MG/DL (ref 0.1–1.4)
BUN BLDV-MCNC: 25 MG/DL
CALCIUM SERPL-MCNC: 10.5 MG/DL
CHLORIDE BLD-SCNC: 102 MMOL/L
CHOLESTEROL, TOTAL: 142 MG/DL
CHOLESTEROL/HDL RATIO: NORMAL
CO2: NORMAL MMOL/L
CREAT SERPL-MCNC: 1.35 MG/DL
EOSINOPHILS ABSOLUTE: NORMAL /ΜL
EOSINOPHILS RELATIVE PERCENT: NORMAL %
GFR CALCULATED: NORMAL
GLUCOSE BLD-MCNC: 139 MG/DL
HBA1C MFR BLD: 7.3 %
HCT VFR BLD CALC: 38.2 % (ref 36–46)
HDLC SERPL-MCNC: 46 MG/DL (ref 35–70)
HEMOGLOBIN: 12.8 G/DL (ref 12–16)
LDL CHOLESTEROL CALCULATED: 67 MG/DL (ref 0–160)
LYMPHOCYTES ABSOLUTE: NORMAL /ΜL
LYMPHOCYTES RELATIVE PERCENT: NORMAL %
MCH RBC QN AUTO: NORMAL PG
MCHC RBC AUTO-ENTMCNC: NORMAL G/DL
MCV RBC AUTO: NORMAL FL
MONOCYTES ABSOLUTE: NORMAL /ΜL
MONOCYTES RELATIVE PERCENT: NORMAL %
NEUTROPHILS ABSOLUTE: NORMAL /ΜL
NEUTROPHILS RELATIVE PERCENT: NORMAL %
PLATELET # BLD: 405 K/ΜL
PMV BLD AUTO: NORMAL FL
POTASSIUM SERPL-SCNC: 4.4 MMOL/L
RBC # BLD: NORMAL 10^6/ΜL
SODIUM BLD-SCNC: 137 MMOL/L
T4 FREE: 0.85
TOTAL PROTEIN: 7.2
TRIGL SERPL-MCNC: 146 MG/DL
TSH SERPL DL<=0.05 MIU/L-ACNC: 1.77 UIU/ML
VLDLC SERPL CALC-MCNC: NORMAL MG/DL
WBC # BLD: 7.4 10^3/ML

## 2018-09-27 DIAGNOSIS — N18.30 CKD (CHRONIC KIDNEY DISEASE) STAGE 3, GFR 30-59 ML/MIN (HCC): ICD-10-CM

## 2018-09-27 DIAGNOSIS — E11.8 TYPE 2 DIABETES MELLITUS WITH COMPLICATION, WITHOUT LONG-TERM CURRENT USE OF INSULIN (HCC): ICD-10-CM

## 2018-09-27 DIAGNOSIS — E03.9 HYPOTHYROIDISM, UNSPECIFIED TYPE: ICD-10-CM

## 2018-09-27 DIAGNOSIS — Z86.73 HISTORY OF CVA (CEREBROVASCULAR ACCIDENT): ICD-10-CM

## 2018-09-27 DIAGNOSIS — I10 ESSENTIAL HYPERTENSION: ICD-10-CM

## 2018-09-27 DIAGNOSIS — D50.9 IRON DEFICIENCY ANEMIA, UNSPECIFIED IRON DEFICIENCY ANEMIA TYPE: ICD-10-CM

## 2018-09-27 DIAGNOSIS — G47.33 OBSTRUCTIVE SLEEP APNEA SYNDROME: ICD-10-CM

## 2018-09-27 DIAGNOSIS — E78.00 PURE HYPERCHOLESTEROLEMIA: ICD-10-CM

## 2018-10-01 ENCOUNTER — OFFICE VISIT (OUTPATIENT)
Dept: FAMILY MEDICINE CLINIC | Age: 83
End: 2018-10-01
Payer: COMMERCIAL

## 2018-10-01 VITALS
DIASTOLIC BLOOD PRESSURE: 70 MMHG | TEMPERATURE: 98 F | OXYGEN SATURATION: 98 % | BODY MASS INDEX: 40.79 KG/M2 | RESPIRATION RATE: 16 BRPM | WEIGHT: 223 LBS | HEART RATE: 81 BPM | SYSTOLIC BLOOD PRESSURE: 120 MMHG

## 2018-10-01 DIAGNOSIS — K21.9 GASTROESOPHAGEAL REFLUX DISEASE WITHOUT ESOPHAGITIS: ICD-10-CM

## 2018-10-01 DIAGNOSIS — I10 ESSENTIAL HYPERTENSION: Primary | ICD-10-CM

## 2018-10-01 DIAGNOSIS — G47.33 OBSTRUCTIVE SLEEP APNEA SYNDROME: ICD-10-CM

## 2018-10-01 DIAGNOSIS — N18.30 CKD (CHRONIC KIDNEY DISEASE) STAGE 3, GFR 30-59 ML/MIN (HCC): ICD-10-CM

## 2018-10-01 DIAGNOSIS — E11.8 TYPE 2 DIABETES MELLITUS WITH COMPLICATION, WITHOUT LONG-TERM CURRENT USE OF INSULIN (HCC): ICD-10-CM

## 2018-10-01 DIAGNOSIS — R29.898 WEAKNESS OF LEFT LEG: ICD-10-CM

## 2018-10-01 DIAGNOSIS — E03.9 HYPOTHYROIDISM, UNSPECIFIED TYPE: ICD-10-CM

## 2018-10-01 DIAGNOSIS — J45.20 MILD INTERMITTENT ASTHMA WITHOUT COMPLICATION: ICD-10-CM

## 2018-10-01 DIAGNOSIS — E78.00 PURE HYPERCHOLESTEROLEMIA: ICD-10-CM

## 2018-10-01 DIAGNOSIS — Z86.73 HISTORY OF CVA (CEREBROVASCULAR ACCIDENT): ICD-10-CM

## 2018-10-01 DIAGNOSIS — F41.9 ANXIETY: ICD-10-CM

## 2018-10-01 DIAGNOSIS — N30.20 CYSTITIS, CHRONIC: ICD-10-CM

## 2018-10-01 DIAGNOSIS — D50.9 IRON DEFICIENCY ANEMIA, UNSPECIFIED IRON DEFICIENCY ANEMIA TYPE: ICD-10-CM

## 2018-10-01 DIAGNOSIS — N39.41 URGE INCONTINENCE: ICD-10-CM

## 2018-10-01 PROCEDURE — 99214 OFFICE O/P EST MOD 30 MIN: CPT | Performed by: FAMILY MEDICINE

## 2018-10-01 RX ORDER — GLIMEPIRIDE 2 MG/1
2 TABLET ORAL EVERY MORNING
Qty: 90 TABLET | Refills: 3 | Status: SHIPPED | OUTPATIENT
Start: 2018-10-01 | End: 2019-09-25 | Stop reason: SDUPTHER

## 2018-10-01 ASSESSMENT — ENCOUNTER SYMPTOMS
SORE THROAT: 0
COUGH: 0
NAUSEA: 0
RHINORRHEA: 0
ABDOMINAL DISTENTION: 0
SHORTNESS OF BREATH: 0
DIARRHEA: 0
VOMITING: 0
CHEST TIGHTNESS: 0
BACK PAIN: 0
ABDOMINAL PAIN: 0
CONSTIPATION: 0

## 2018-10-01 NOTE — PROGRESS NOTES
tablet     Refill:  3     Continue with ADA diet, current diabetic meds, and monitoring BS's as instructed and will cont with the Metformin 500mg daily and will add Amaryl 2mg daily. She will call with BS readings in 2 weeks    Cont with daily foot exams and yearly eye exams    No ACE-I or ARB secondary to angioedema/epiglottis     Will cont with low fat/chol diet and Lipitor as ordered    Increase fluids throughout the day    Will stay off the Iron and recheck Iron level and CBC in 3 months - her last HGB was stable    Will stay off the Lisinopril secondary to angioedema/epiglittitis    Will follow up with Urology for her chronic cystitis and frequent UTI's and stay on the Trimpex daily    Rest of systems unchanged, continue current treatments. Medications, labs, diagnostic studies, consultations and follow-up as documented in this encounter.  Rest of systems unchanged, continue current treatments        Rufino Guillermo MD

## 2018-10-08 ENCOUNTER — OFFICE VISIT (OUTPATIENT)
Dept: UROLOGY | Age: 83
End: 2018-10-08
Payer: COMMERCIAL

## 2018-10-08 VITALS
HEIGHT: 63 IN | WEIGHT: 222 LBS | TEMPERATURE: 98.5 F | BODY MASS INDEX: 39.34 KG/M2 | DIASTOLIC BLOOD PRESSURE: 72 MMHG | HEART RATE: 80 BPM | SYSTOLIC BLOOD PRESSURE: 143 MMHG

## 2018-10-08 DIAGNOSIS — R35.0 URINARY FREQUENCY: ICD-10-CM

## 2018-10-08 DIAGNOSIS — R39.15 URGENCY OF URINATION: Primary | ICD-10-CM

## 2018-10-08 DIAGNOSIS — N39.41 URGE INCONTINENCE: ICD-10-CM

## 2018-10-08 DIAGNOSIS — N39.0 RECURRENT UTI: ICD-10-CM

## 2018-10-08 PROCEDURE — 99214 OFFICE O/P EST MOD 30 MIN: CPT | Performed by: UROLOGY

## 2018-10-08 RX ORDER — MIRABEGRON 50 MG/1
50 TABLET, FILM COATED, EXTENDED RELEASE ORAL DAILY
Qty: 30 TABLET | Refills: 11 | Status: SHIPPED | OUTPATIENT
Start: 2018-10-08 | End: 2019-04-01 | Stop reason: ALTCHOICE

## 2018-10-08 ASSESSMENT — ENCOUNTER SYMPTOMS
COLOR CHANGE: 0
EYE PAIN: 0
COUGH: 0
WHEEZING: 0
SHORTNESS OF BREATH: 0
EYE REDNESS: 0
ABDOMINAL PAIN: 1
NAUSEA: 0
VOMITING: 0
BACK PAIN: 1

## 2018-10-08 NOTE — PROGRESS NOTES
[facility]  [facility] [place]  [address]  [address]  [address] [facility] [address]  Dept: [address] [facility] Office Note - Established    Patient:  Blanca Suarez  YOB: 1935  Date: 10/8/2018    The patient is a 82 y.o. female who presents today for evaluation of the following problems:   Chief Complaint   Patient presents with    Incontinence     2-3 briefs per day. no control what so ever. x 1 month , only goes to the restroom every 4 hrs        HPI   Urge Urinary Incontinence:  Patient is here today for urge urinary incontinence which started a few year(s) ago. Recently the urinary incontinence symptoms: are worsening  Current medical Rx for urge urinary incontinence: none  Previous treatments tried for urge urinary Incontinence: none  Urge Incontinence:  Severity = moderate. Stress incontinence: Severity = moderate. Number of pads per day: 3  Frequency: patient typically voids every 1 hour  Urgency: Always  Nocturia x 1-2  Other LUTS: urgency, frequency, nocturia, 2 times per night and urine incontinence:  mixed     Previous urodynamic evaluation: no        Summary of old records: N/A    Additional History: N/A    Procedures Today: N/A    Urinalysis today:  No results found for this visit on 10/08/18. Imaging Reviewed during this Office Visit: none  (results were independently reviewed by physician and radiology report verified)    AUA Symptom Score (10/8/2018):   INCOMPLETE EMPTYING: How often have you had the sensation of not emptying your bladder?: Not at all  FREQUENCY: How often do you have to urinate less than every two hours?: Not at all  INTERMITTENCY: How often have you found you stopped and started again several times when you urinated?: Not at all  URGENCY: How often have you found it difficult to postpone urination?: Almost always  WEAK STREAM: How often have you had a weak urinary stream?: Not at all  STRAINING: How often have you had to

## 2018-11-28 ENCOUNTER — OFFICE VISIT (OUTPATIENT)
Dept: PODIATRY | Age: 83
End: 2018-11-28
Payer: COMMERCIAL

## 2018-11-28 VITALS — WEIGHT: 210 LBS | BODY MASS INDEX: 38.64 KG/M2 | HEIGHT: 62 IN

## 2018-11-28 DIAGNOSIS — E11.42 TYPE 2 DIABETES MELLITUS WITH DIABETIC POLYNEUROPATHY, WITHOUT LONG-TERM CURRENT USE OF INSULIN (HCC): ICD-10-CM

## 2018-11-28 DIAGNOSIS — B35.1 DERMATOPHYTOSIS OF NAIL: Primary | ICD-10-CM

## 2018-11-28 PROCEDURE — 11721 DEBRIDE NAIL 6 OR MORE: CPT | Performed by: PODIATRIST

## 2018-11-28 PROCEDURE — 99999 PR OFFICE/OUTPT VISIT,PROCEDURE ONLY: CPT | Performed by: PODIATRIST

## 2018-11-28 NOTE — PROGRESS NOTES
assessment of pedal pulses, orthopedic exam for deformities, and shoe inspection. Increased risk factors noted on the diabetic foot exam include decreased sensory exam and peripheral neuropathy. Shoegear inspected and found to be appropriate size and wear.         Electronically signed by Santos Ramesh DPM on 11/28/2018 at 2:50 PM

## 2018-12-07 ENCOUNTER — TELEPHONE (OUTPATIENT)
Dept: FAMILY MEDICINE CLINIC | Age: 83
End: 2018-12-07

## 2018-12-07 DIAGNOSIS — J30.81 ALLERGIC RHINITIS DUE TO ANIMAL HAIR AND DANDER: Primary | ICD-10-CM

## 2019-01-07 ENCOUNTER — OFFICE VISIT (OUTPATIENT)
Dept: UROLOGY | Age: 84
End: 2019-01-07
Payer: COMMERCIAL

## 2019-01-07 VITALS
HEART RATE: 76 BPM | SYSTOLIC BLOOD PRESSURE: 150 MMHG | DIASTOLIC BLOOD PRESSURE: 79 MMHG | BODY MASS INDEX: 41.7 KG/M2 | TEMPERATURE: 98.9 F | HEIGHT: 62 IN | WEIGHT: 226.6 LBS

## 2019-01-07 DIAGNOSIS — R35.0 URINARY FREQUENCY: ICD-10-CM

## 2019-01-07 DIAGNOSIS — R39.15 URGENCY OF URINATION: Primary | ICD-10-CM

## 2019-01-07 DIAGNOSIS — N39.41 URGE INCONTINENCE: ICD-10-CM

## 2019-01-07 PROCEDURE — 99214 OFFICE O/P EST MOD 30 MIN: CPT | Performed by: UROLOGY

## 2019-01-07 ASSESSMENT — ENCOUNTER SYMPTOMS
EYE PAIN: 0
NAUSEA: 0
COUGH: 0
COLOR CHANGE: 0
ABDOMINAL PAIN: 0
VOMITING: 0
BACK PAIN: 1
WHEEZING: 0
EYE REDNESS: 0
SHORTNESS OF BREATH: 0

## 2019-01-09 ENCOUNTER — TELEPHONE (OUTPATIENT)
Dept: FAMILY MEDICINE CLINIC | Age: 84
End: 2019-01-09

## 2019-01-09 DIAGNOSIS — H91.90 HEARING LOSS, UNSPECIFIED HEARING LOSS TYPE, UNSPECIFIED LATERALITY: Primary | ICD-10-CM

## 2019-01-09 RX ORDER — GLUCOSAMINE HCL/CHONDROITIN SU 500-400 MG
CAPSULE ORAL
Qty: 50 STRIP | Refills: 3 | Status: SHIPPED | OUTPATIENT
Start: 2019-01-09 | End: 2020-06-29 | Stop reason: SDUPTHER

## 2019-01-09 RX ORDER — LANCETS 30 GAUGE
EACH MISCELLANEOUS
Qty: 50 EACH | Refills: 3 | Status: SHIPPED | OUTPATIENT
Start: 2019-01-09 | End: 2020-06-29 | Stop reason: SDUPTHER

## 2019-01-15 LAB
AVERAGE GLUCOSE: 134
BASOPHILS ABSOLUTE: NORMAL /ΜL
BASOPHILS RELATIVE PERCENT: NORMAL %
BUN BLDV-MCNC: 23 MG/DL
CALCIUM SERPL-MCNC: 10.2 MG/DL
CHLORIDE BLD-SCNC: 104 MMOL/L
CO2: 22 MMOL/L
CREAT SERPL-MCNC: 1.36 MG/DL
EOSINOPHILS ABSOLUTE: NORMAL /ΜL
EOSINOPHILS RELATIVE PERCENT: NORMAL %
GFR CALCULATED: 37
GLUCOSE BLD-MCNC: 105 MG/DL
HBA1C MFR BLD: 6.3 %
HCT VFR BLD CALC: 39.2 % (ref 36–46)
HEMOGLOBIN: 12.9 G/DL (ref 12–16)
IRON: 79
LYMPHOCYTES ABSOLUTE: NORMAL /ΜL
LYMPHOCYTES RELATIVE PERCENT: NORMAL %
MCH RBC QN AUTO: NORMAL PG
MCHC RBC AUTO-ENTMCNC: NORMAL G/DL
MCV RBC AUTO: NORMAL FL
MONOCYTES ABSOLUTE: NORMAL /ΜL
MONOCYTES RELATIVE PERCENT: NORMAL %
NEUTROPHILS ABSOLUTE: NORMAL /ΜL
NEUTROPHILS RELATIVE PERCENT: NORMAL %
PLATELET # BLD: 378 K/ΜL
PMV BLD AUTO: NORMAL FL
POTASSIUM SERPL-SCNC: 3.9 MMOL/L
RBC # BLD: NORMAL 10^6/ΜL
SODIUM BLD-SCNC: 141 MMOL/L
WBC # BLD: 7.4 10^3/ML

## 2019-01-18 ENCOUNTER — OFFICE VISIT (OUTPATIENT)
Dept: FAMILY MEDICINE CLINIC | Age: 84
End: 2019-01-18
Payer: COMMERCIAL

## 2019-01-18 VITALS
HEART RATE: 70 BPM | BODY MASS INDEX: 41.7 KG/M2 | TEMPERATURE: 98.6 F | RESPIRATION RATE: 18 BRPM | WEIGHT: 228 LBS | SYSTOLIC BLOOD PRESSURE: 128 MMHG | OXYGEN SATURATION: 94 % | DIASTOLIC BLOOD PRESSURE: 76 MMHG

## 2019-01-18 DIAGNOSIS — N18.30 CKD (CHRONIC KIDNEY DISEASE) STAGE 3, GFR 30-59 ML/MIN (HCC): ICD-10-CM

## 2019-01-18 DIAGNOSIS — I10 ESSENTIAL HYPERTENSION: ICD-10-CM

## 2019-01-18 DIAGNOSIS — D50.9 IRON DEFICIENCY ANEMIA, UNSPECIFIED IRON DEFICIENCY ANEMIA TYPE: ICD-10-CM

## 2019-01-18 DIAGNOSIS — I10 ESSENTIAL HYPERTENSION: Primary | ICD-10-CM

## 2019-01-18 DIAGNOSIS — J45.20 MILD INTERMITTENT ASTHMA WITHOUT COMPLICATION: ICD-10-CM

## 2019-01-18 DIAGNOSIS — K21.9 GASTROESOPHAGEAL REFLUX DISEASE WITHOUT ESOPHAGITIS: ICD-10-CM

## 2019-01-18 DIAGNOSIS — E11.8 TYPE 2 DIABETES MELLITUS WITH COMPLICATION, WITHOUT LONG-TERM CURRENT USE OF INSULIN (HCC): ICD-10-CM

## 2019-01-18 DIAGNOSIS — G47.33 OBSTRUCTIVE SLEEP APNEA SYNDROME: ICD-10-CM

## 2019-01-18 DIAGNOSIS — Z86.73 HISTORY OF CVA (CEREBROVASCULAR ACCIDENT): ICD-10-CM

## 2019-01-18 DIAGNOSIS — E03.9 HYPOTHYROIDISM, UNSPECIFIED TYPE: ICD-10-CM

## 2019-01-18 DIAGNOSIS — R29.898 WEAKNESS OF LEFT LEG: ICD-10-CM

## 2019-01-18 DIAGNOSIS — F41.9 ANXIETY: ICD-10-CM

## 2019-01-18 DIAGNOSIS — E78.00 PURE HYPERCHOLESTEROLEMIA: ICD-10-CM

## 2019-01-18 DIAGNOSIS — N30.20 CYSTITIS, CHRONIC: ICD-10-CM

## 2019-01-18 PROCEDURE — 99214 OFFICE O/P EST MOD 30 MIN: CPT | Performed by: FAMILY MEDICINE

## 2019-01-18 ASSESSMENT — ENCOUNTER SYMPTOMS
RHINORRHEA: 0
ABDOMINAL DISTENTION: 0
NAUSEA: 0
CONSTIPATION: 0
SORE THROAT: 0
DIARRHEA: 0
BACK PAIN: 0
COUGH: 1
VOMITING: 0
ABDOMINAL PAIN: 0
CHEST TIGHTNESS: 0
SHORTNESS OF BREATH: 0

## 2019-01-18 ASSESSMENT — PATIENT HEALTH QUESTIONNAIRE - PHQ9
SUM OF ALL RESPONSES TO PHQ QUESTIONS 1-9: 2
1. LITTLE INTEREST OR PLEASURE IN DOING THINGS: 1
2. FEELING DOWN, DEPRESSED OR HOPELESS: 1
SUM OF ALL RESPONSES TO PHQ QUESTIONS 1-9: 2
SUM OF ALL RESPONSES TO PHQ9 QUESTIONS 1 & 2: 2

## 2019-02-01 DIAGNOSIS — E78.00 PURE HYPERCHOLESTEROLEMIA: Primary | ICD-10-CM

## 2019-02-01 RX ORDER — ATORVASTATIN CALCIUM 10 MG/1
TABLET, FILM COATED ORAL
Qty: 30 TABLET | Refills: 5 | Status: SHIPPED | OUTPATIENT
Start: 2019-02-01 | End: 2019-07-31 | Stop reason: SDUPTHER

## 2019-02-04 RX ORDER — DULOXETIN HYDROCHLORIDE 60 MG/1
CAPSULE, DELAYED RELEASE ORAL
Qty: 30 CAPSULE | Refills: 11 | Status: SHIPPED | OUTPATIENT
Start: 2019-02-04 | End: 2019-02-27 | Stop reason: SDUPTHER

## 2019-02-27 ENCOUNTER — OFFICE VISIT (OUTPATIENT)
Dept: PODIATRY | Age: 84
End: 2019-02-27
Payer: COMMERCIAL

## 2019-02-27 VITALS — HEIGHT: 63 IN | WEIGHT: 210 LBS | BODY MASS INDEX: 37.21 KG/M2

## 2019-02-27 DIAGNOSIS — E11.42 TYPE 2 DIABETES MELLITUS WITH DIABETIC POLYNEUROPATHY, WITHOUT LONG-TERM CURRENT USE OF INSULIN (HCC): ICD-10-CM

## 2019-02-27 DIAGNOSIS — B35.1 DERMATOPHYTOSIS OF NAIL: Primary | ICD-10-CM

## 2019-02-27 PROCEDURE — 99999 PR OFFICE/OUTPT VISIT,PROCEDURE ONLY: CPT | Performed by: PODIATRIST

## 2019-02-27 PROCEDURE — 11721 DEBRIDE NAIL 6 OR MORE: CPT | Performed by: PODIATRIST

## 2019-03-27 LAB
ALBUMIN SERPL-MCNC: 4 G/DL
ALP BLD-CCNC: 46 U/L
ALT SERPL-CCNC: 9 U/L
ANION GAP SERPL CALCULATED.3IONS-SCNC: 14 MMOL/L
AST SERPL-CCNC: 18 U/L
AVERAGE GLUCOSE: 134
BASOPHILS ABSOLUTE: NORMAL /ΜL
BASOPHILS RELATIVE PERCENT: NORMAL %
BILIRUB SERPL-MCNC: 0.4 MG/DL (ref 0.1–1.4)
BUN BLDV-MCNC: 25 MG/DL
CALCIUM SERPL-MCNC: 9.8 MG/DL
CHLORIDE BLD-SCNC: 105 MMOL/L
CHOLESTEROL, TOTAL: 151 MG/DL
CHOLESTEROL/HDL RATIO: 2.8
CO2: 21 MMOL/L
CREAT SERPL-MCNC: 1.15 MG/DL
EOSINOPHILS ABSOLUTE: NORMAL /ΜL
EOSINOPHILS RELATIVE PERCENT: NORMAL %
GFR CALCULATED: 45
GLUCOSE BLD-MCNC: 151 MG/DL
HBA1C MFR BLD: 6.3 %
HCT VFR BLD CALC: 39.2 % (ref 36–46)
HDLC SERPL-MCNC: 53 MG/DL (ref 35–70)
HEMOGLOBIN: 13.1 G/DL (ref 12–16)
IRON: 65
LDL CHOLESTEROL CALCULATED: 60 MG/DL (ref 0–160)
LYMPHOCYTES ABSOLUTE: NORMAL /ΜL
LYMPHOCYTES RELATIVE PERCENT: NORMAL %
MCH RBC QN AUTO: NORMAL PG
MCHC RBC AUTO-ENTMCNC: NORMAL G/DL
MCV RBC AUTO: NORMAL FL
MONOCYTES ABSOLUTE: NORMAL /ΜL
MONOCYTES RELATIVE PERCENT: NORMAL %
NEUTROPHILS ABSOLUTE: NORMAL /ΜL
NEUTROPHILS RELATIVE PERCENT: NORMAL %
PLATELET # BLD: NORMAL K/ΜL
PMV BLD AUTO: NORMAL FL
POTASSIUM SERPL-SCNC: 4.1 MMOL/L
RBC # BLD: NORMAL 10^6/ΜL
SODIUM BLD-SCNC: 140 MMOL/L
T4 FREE: 0.8
TOTAL PROTEIN: 7.5
TRIGL SERPL-MCNC: 188 MG/DL
TSH SERPL DL<=0.05 MIU/L-ACNC: 3.32 UIU/ML
VLDLC SERPL CALC-MCNC: 38 MG/DL
WBC # BLD: 7.9 10^3/ML

## 2019-03-29 DIAGNOSIS — E11.8 TYPE 2 DIABETES MELLITUS WITH COMPLICATION, WITHOUT LONG-TERM CURRENT USE OF INSULIN (HCC): ICD-10-CM

## 2019-03-29 DIAGNOSIS — E78.00 PURE HYPERCHOLESTEROLEMIA: ICD-10-CM

## 2019-03-29 DIAGNOSIS — N18.30 CKD (CHRONIC KIDNEY DISEASE) STAGE 3, GFR 30-59 ML/MIN (HCC): ICD-10-CM

## 2019-03-29 DIAGNOSIS — D50.9 IRON DEFICIENCY ANEMIA, UNSPECIFIED IRON DEFICIENCY ANEMIA TYPE: ICD-10-CM

## 2019-03-29 DIAGNOSIS — E03.9 HYPOTHYROIDISM, UNSPECIFIED TYPE: ICD-10-CM

## 2019-03-29 DIAGNOSIS — I10 ESSENTIAL HYPERTENSION: ICD-10-CM

## 2019-04-01 DIAGNOSIS — R39.15 URGENCY OF URINATION: Primary | ICD-10-CM

## 2019-04-01 RX ORDER — OXYBUTYNIN CHLORIDE 5 MG/1
5 TABLET, EXTENDED RELEASE ORAL DAILY
Qty: 90 TABLET | Refills: 3 | Status: SHIPPED | OUTPATIENT
Start: 2019-04-01 | End: 2020-07-24 | Stop reason: SDUPTHER

## 2019-04-01 NOTE — PROGRESS NOTES
Oxybutynin 5 mg er Daily   To replace Myrbetriq  For cost effectiveness for the pt  Per DR Darlene Roldan

## 2019-04-08 ENCOUNTER — OFFICE VISIT (OUTPATIENT)
Dept: FAMILY MEDICINE CLINIC | Age: 84
End: 2019-04-08
Payer: COMMERCIAL

## 2019-04-08 VITALS
BODY MASS INDEX: 39.15 KG/M2 | RESPIRATION RATE: 16 BRPM | OXYGEN SATURATION: 95 % | DIASTOLIC BLOOD PRESSURE: 76 MMHG | SYSTOLIC BLOOD PRESSURE: 130 MMHG | WEIGHT: 221 LBS | TEMPERATURE: 98.7 F | HEART RATE: 66 BPM

## 2019-04-08 DIAGNOSIS — E11.8 TYPE 2 DIABETES MELLITUS WITH COMPLICATION, WITHOUT LONG-TERM CURRENT USE OF INSULIN (HCC): ICD-10-CM

## 2019-04-08 DIAGNOSIS — N39.41 URGE INCONTINENCE: ICD-10-CM

## 2019-04-08 DIAGNOSIS — E78.00 PURE HYPERCHOLESTEROLEMIA: ICD-10-CM

## 2019-04-08 DIAGNOSIS — N30.20 CYSTITIS, CHRONIC: ICD-10-CM

## 2019-04-08 DIAGNOSIS — F32.A DEPRESSION, UNSPECIFIED DEPRESSION TYPE: ICD-10-CM

## 2019-04-08 DIAGNOSIS — R53.83 FATIGUE, UNSPECIFIED TYPE: ICD-10-CM

## 2019-04-08 DIAGNOSIS — E03.9 HYPOTHYROIDISM, UNSPECIFIED TYPE: ICD-10-CM

## 2019-04-08 DIAGNOSIS — N18.30 CKD (CHRONIC KIDNEY DISEASE) STAGE 3, GFR 30-59 ML/MIN (HCC): ICD-10-CM

## 2019-04-08 DIAGNOSIS — N39.0 RECURRENT UTI: ICD-10-CM

## 2019-04-08 DIAGNOSIS — D50.9 IRON DEFICIENCY ANEMIA, UNSPECIFIED IRON DEFICIENCY ANEMIA TYPE: ICD-10-CM

## 2019-04-08 DIAGNOSIS — G47.33 OBSTRUCTIVE SLEEP APNEA SYNDROME: ICD-10-CM

## 2019-04-08 DIAGNOSIS — K21.9 GASTROESOPHAGEAL REFLUX DISEASE WITHOUT ESOPHAGITIS: ICD-10-CM

## 2019-04-08 DIAGNOSIS — I10 ESSENTIAL HYPERTENSION: Primary | ICD-10-CM

## 2019-04-08 DIAGNOSIS — F41.9 ANXIETY: ICD-10-CM

## 2019-04-08 PROCEDURE — 99214 OFFICE O/P EST MOD 30 MIN: CPT | Performed by: FAMILY MEDICINE

## 2019-04-08 RX ORDER — TRIMETHOPRIM 100 MG/1
100 TABLET ORAL DAILY
Qty: 30 TABLET | Refills: 11 | Status: SHIPPED | OUTPATIENT
Start: 2019-04-08 | End: 2020-08-04 | Stop reason: ALTCHOICE

## 2019-04-08 RX ORDER — LORAZEPAM 0.5 MG/1
0.5 TABLET ORAL 2 TIMES DAILY PRN
Qty: 60 TABLET | Refills: 0 | Status: SHIPPED | OUTPATIENT
Start: 2019-04-08 | End: 2019-10-04 | Stop reason: SDUPTHER

## 2019-04-08 ASSESSMENT — ENCOUNTER SYMPTOMS
NAUSEA: 0
VOMITING: 0
BACK PAIN: 0
DIARRHEA: 0
CONSTIPATION: 0
SORE THROAT: 0
COUGH: 0
RHINORRHEA: 0
CHEST TIGHTNESS: 0
SHORTNESS OF BREATH: 0
ABDOMINAL DISTENTION: 0
ABDOMINAL PAIN: 0

## 2019-04-08 NOTE — PROGRESS NOTES
(SYNTHROID) 50 MCG tablet take 1 tablet by mouth once daily 90 tablet 3    CARTIA  MG extended release capsule take 1 capsule by mouth once daily 90 capsule 3    budesonide-formoterol (SYMBICORT) 80-4.5 MCG/ACT AERO Inhale 2 puffs into the lungs 2 times daily 1 Inhaler 1    cetirizine (ZYRTEC) 10 MG tablet Take 10 mg by mouth daily as needed.  Multiple Vitamin (THERA) TABS Take 1 tablet by mouth daily.  aspirin 81 MG EC tablet Take 81 mg by mouth daily.  polyethylene glycol (GLYCOLAX) packet Take 17 g by mouth daily  0    latanoprost (XALATAN) 0.005 % ophthalmic solution        No current facility-administered medications for this visit. Review of Systems   Constitutional: Positive for fatigue. Negative for appetite change and fever. HENT: Negative for congestion, ear pain, rhinorrhea and sore throat. Respiratory: Negative for cough, chest tightness and shortness of breath. Cardiovascular: Negative for chest pain and palpitations. Gastrointestinal: Negative for abdominal distention, abdominal pain, constipation, diarrhea, nausea and vomiting. Genitourinary: Positive for difficulty urinating (incontinence). Negative for dysuria. Musculoskeletal: Positive for arthralgias and gait problem (walks with a cane). Negative for back pain and myalgias. Skin: Negative for rash. Neurological: Positive for weakness (LLE). Negative for dizziness, light-headedness and headaches. Hematological: Negative for adenopathy. Psychiatric/Behavioral: Positive for dysphoric mood. Negative for behavioral problems and sleep disturbance. The patient is nervous/anxious. Objective:   Physical Exam   Constitutional: She is oriented to person, place, and time. She appears well-developed. No distress. HENT:   Head: Normocephalic and atraumatic.    Right Ear: External ear normal.   Left Ear: External ear normal.   Nose: Nose normal.   Mouth/Throat: Oropharynx is clear and moist. No oropharyngeal exudate. Eyes: Pupils are equal, round, and reactive to light. Conjunctivae and EOM are normal.   Neck: Normal range of motion. Cardiovascular: Normal rate, regular rhythm, normal heart sounds and intact distal pulses. No murmur heard. Pulmonary/Chest: Effort normal and breath sounds normal. No respiratory distress. She has no wheezes. She exhibits no tenderness. Abdominal: Soft. Bowel sounds are normal. She exhibits no distension and no mass. There is no tenderness. Musculoskeletal: Normal range of motion. She exhibits no edema or tenderness. Lymphadenopathy:     She has no cervical adenopathy. Neurological: She is alert and oriented to person, place, and time. She has normal reflexes. Skin: No rash noted. Psychiatric: She has a normal mood and affect. Her behavior is normal.   Vitals reviewed. Labs reviewed with pt today. Assessment:       Diagnosis Orders   1. Essential hypertension  Basic Metabolic Panel    CBC   2. Pure hypercholesterolemia     3. Type 2 diabetes mellitus with complication, without long-term current use of insulin ([facility])  Basic Metabolic Panel    Hemoglobin A1C   4. CKD (chronic kidney disease) stage 3, GFR 30-59 ml/min ([facility])  Basic Metabolic Panel   5. Iron deficiency anemia, unspecified iron deficiency anemia type  CBC   6. Obstructive sleep apnea syndrome  Basic Metabolic Panel   7. Hypothyroidism, unspecified type     8. Depression, unspecified depression type     9. Anxiety  LORazepam (ATIVAN) 0.5 MG tablet   10. Gastroesophageal reflux disease without esophagitis     11. Chronic cystitis. 12. Urge incontinence     13. Recurrent UTI  trimethoprim (TRIMPEX) 100 MG tablet   14.  Fatigue, unspecified type  Vitamin B12         Plan:      Orders Placed This Encounter   Procedures    Basic Metabolic Panel     Standing Status:   Future     Standing Expiration Date:   4/7/2020    CBC     Standing Status:   Future     Standing Expiration Date:   4/7/2020  Hemoglobin A1C     Standing Status:   Future     Standing Expiration Date:   4/7/2020    Vitamin B12     Standing Status:   Future     Standing Expiration Date:   4/7/2020      Orders Placed This Encounter   Medications    LORazepam (ATIVAN) 0.5 MG tablet     Sig: Take 1 tablet by mouth 2 times daily as needed for Anxiety for up to 30 days. Dispense:  60 tablet     Refill:  0    trimethoprim (TRIMPEX) 100 MG tablet     Sig: Take 1 tablet by mouth daily     Dispense:  30 tablet     Refill:  11     Continue with ADA diet, current diabetic meds, and monitoring BS's as instructed    Cont with daily foot exams and yearly eye exams    No ACE-I or ARB secondary to angioedema    Will cont with low fat/chol diet and Lipitor as ordered    Increase fluids throughout the day    Will get her back on Cymbalta 60mg and will refill the Ativan - I did state I think her fatigue may be possibly secondary to her depression    Will cont to follow with Dr. Dino Hart as instructed    Rest of systems unchanged, continue current treatments. Medications, labs, diagnostic studies, consultations and follow-up as documented in this encounter.  Rest of systems unchanged, continue current treatments        Dorian Jeter MD

## 2019-04-08 NOTE — PROGRESS NOTES
Visit Information    Have you changed or started any medications since your last visit including any over-the-counter medicines, vitamins, or herbal medicines? yes - see med list   Have you stopped taking any of your medications? Is so, why? -  no  Are you having any side effects from any of your medications? - no    Have you seen any other physician or provider since your last visit?  no   Have you had any other diagnostic tests since your last visit?  no   Have you been seen in the emergency room and/or had an admission in a hospital since we last saw you?  yes - back pain   Have you had your routine dental cleaning in the past 6 months?  yes - 11/18     Do you have an active MyChart account? If no, what is the barrier?   Yes    Patient Care Team:  Tripp Ohara MD as PCP - General (Family Medicine)  Matthew Issa MD as Consulting Physician (Gastroenterology)  Basim An DPM as Consulting Physician (Podiatry)  Anne Blanco OD as Consulting Physician (Optometry)  Miko Cordon MD as Consulting Physician (Urology)  Israel Kraus MD as Surgeon (General Surgery)    Medical History Review  Past Medical, Family, and Social History reviewed and does contribute to the patient presenting condition    Health Maintenance   Topic Date Due    Shingles Vaccine (1 of 2) 01/18/2020 (Originally 6/18/1987)    DTaP/Tdap/Td vaccine (1 - Tdap) 11/29/2021 (Originally 6/18/1954)    TSH testing  03/27/2020    Potassium monitoring  03/27/2020    Creatinine monitoring  03/27/2020    Flu vaccine  Completed    Pneumococcal 65+ years Vaccine  Completed    DEXA (modify frequency per FRAX score)  Addressed

## 2019-04-15 ENCOUNTER — OFFICE VISIT (OUTPATIENT)
Dept: UROLOGY | Age: 84
End: 2019-04-15
Payer: COMMERCIAL

## 2019-04-15 VITALS
SYSTOLIC BLOOD PRESSURE: 156 MMHG | TEMPERATURE: 98.6 F | HEIGHT: 62 IN | BODY MASS INDEX: 42.14 KG/M2 | WEIGHT: 229 LBS | DIASTOLIC BLOOD PRESSURE: 72 MMHG

## 2019-04-15 DIAGNOSIS — R35.0 URINARY FREQUENCY: ICD-10-CM

## 2019-04-15 DIAGNOSIS — R39.15 URGENCY OF URINATION: Primary | ICD-10-CM

## 2019-04-15 DIAGNOSIS — N39.41 URGE INCONTINENCE: ICD-10-CM

## 2019-04-15 PROCEDURE — 99214 OFFICE O/P EST MOD 30 MIN: CPT | Performed by: UROLOGY

## 2019-04-15 ASSESSMENT — ENCOUNTER SYMPTOMS
ABDOMINAL PAIN: 0
VOMITING: 0
BACK PAIN: 1
WHEEZING: 0
COUGH: 0
EYE REDNESS: 0
SHORTNESS OF BREATH: 0
EYE PAIN: 0
NAUSEA: 0
COLOR CHANGE: 0

## 2019-04-15 NOTE — PROGRESS NOTES
[facility]  [facility] [place]  [address]  [address]  [address] [facility] [address]  Dept: [address] [facility] Office Note - Established    Patient:  Carlyn Chun  YOB: 1935  Date: 4/15/2019    The patient is a 82 y.o. female whopresents today for evaluation of the following problems:   Chief Complaint   Patient presents with    Urinary Frequency     urgency 3 mo follow up. urgency in the am only when she first gets up        HPI  Overactive Bladder:  Patient is here today for an overactive bladder which started a few year(s) ago. Recently the OAB symptoms: show no change  Current medical Rx for OAB: oxybutynin  Frequency: 1 hour  Nocturia x 3   Consumption of bladder irritants? no  Incontinence? Stress incontinence: Severity = mild. Urge Incontinence:  Severity = mild. Number of pads per day: 1-2    Pt has not had any improvement in symptoms. Summary of old records: N/A    Additional History: N/A    Procedures Today: N/A    Urinalysis today:  No results found for this visit on 04/15/19.     Imaging Reviewed during this Office Visit: none  (results were independently reviewed by physician and radiology report verified)    AUA Symptom Score (4/15/2019):  INCOMPLETE EMPTYING: How often have you had the sensation of not emptying your bladder?: Not at all  FREQUENCY: How often do you have to urinate less than every two hours?: Not at all  INTERMITTENCY: How often have you found you stopped and started again several times when you urinated?: Not at all  URGENCY: How often have you found it difficult to postpone urination?: Less than 1 to 5 times  WEAK STREAM: How often have you had a weak urinary stream?: Not at all  STRAINING: How often have you had to strain to start  urination?: Less than 1 to 5 times  @(8225)@  TOTAL I-PSS SCORE[de-identified] 2  How would you feel if you were to spend the rest of your life with your urinary condition?: Mixe    Last BUN and creatinine:  Lab Results   Component Value Date    BUN 25 03/27/2019     Lab Results   Component Value Date    CREATININE 1.15 03/27/2019       Additional Lab/Culture results: none    PAST MEDICAL, FAMILY AND SOCIAL HISTORY UPDATE:  Past Medical History:   Diagnosis Date    Diverticulosis     Hyperlipidemia     Hypertension     Hypokalemia     Iron deficiency anemia     Menopause     s/p hysterectomy    Osteoarthritis     bilat hands, hips, knees    S/P partial colectomy 2/2002    Stroke ([address]     Thrombocytosis ([address]     Type II or unspecified type diabetes mellitus without mention of complication, not stated as uncontrolled 02/2007    Unspecified sleep apnea 08/2004    uses bipap    UTI (lower urinary tract infection)     re-curring     Past Surgical History:   Procedure Laterality Date    APPENDECTOMY  1993    CARDIOVASCULAR STRESS TEST  03/12    CARPAL TUNNEL RELEASE  1980's    bilat    CHOLECYSTECTOMY  1969    COLON SURGERY  1993    partial colectomy, d/t stricture    COLONOSCOPY  2001    CYSTOCELE REPAIR  1968    HERNIA REPAIR  09/27/2016    ventral/per Dr. Andrew Desai    partial, d/t uterine prolapse    [facility]    lumbar fusion with cement    TONSILLECTOMY  1947    UPPER GASTROINTESTINAL ENDOSCOPY  03/08/2018    Dr. Cesia Jones, WNL     Family History   Problem Relation Age of Onset    Heart Attack Mother     High Blood Pressure Mother     Heart Attack Father     High Blood Pressure Father     Colon Cancer Sister     Diabetes Maternal Aunt     Cancer Other         mcousin, colon, breast ca     Outpatient Medications Marked as Taking for the 4/15/19 encounter (Office Visit) with Rema Jeter MD   Medication Sig Dispense Refill    LORazepam (ATIVAN) 0.5 MG tablet Take 1 tablet by mouth 2 times daily as needed for Anxiety for up to 30 days.  60 tablet 0    trimethoprim (TRIMPEX) 100 MG tablet Take 1 tablet by mouth daily 30 tablet 11    oxybutynin (DITROPAN-XL) 5 MG extended release tablet Take 1 tablet by mouth daily 90 tablet 3    DULoxetine (CYMBALTA) 60 MG extended release capsule take 1 capsule by mouth once daily 30 capsule 11    atorvastatin (LIPITOR) 10 MG tablet take 1 tablet by mouth once daily 30 tablet 5    metFORMIN (GLUCOPHAGE) 500 MG tablet take 1 tablet by mouth every morning 30 tablet 11    Lancets MISC Please dispense according to patients insurance/device. Patient testing weekly, and PRN 50 each 3    blood glucose monitor strips Please supply patient with test strip that insurance covers, tests weekly and PRN. 50 strip 3    dipyridamole (PERSANTINE) 50 MG tablet take 2 tablets by mouth twice a day 120 tablet 11    glimepiride (AMARYL) 2 MG tablet Take 1 tablet by mouth every morning 90 tablet 3    levothyroxine (SYNTHROID) 50 MCG tablet take 1 tablet by mouth once daily 90 tablet 3    CARTIA  MG extended release capsule take 1 capsule by mouth once daily 90 capsule 3    polyethylene glycol (GLYCOLAX) packet Take 17 g by mouth daily  0    budesonide-formoterol (SYMBICORT) 80-4.5 MCG/ACT AERO Inhale 2 puffs into the lungs 2 times daily 1 Inhaler 1    latanoprost (XALATAN) 0.005 % ophthalmic solution       cetirizine (ZYRTEC) 10 MG tablet Take 10 mg by mouth daily as needed.  Multiple Vitamin (THERA) TABS Take 1 tablet by mouth daily.  aspirin 81 MG EC tablet Take 81 mg by mouth daily. (All medications reviewed and updated by provider sincelast office visit or hospitalization)   Pcn [penicillins]; Demerol; Effexor [venlafaxine];  Lisinopril; and Macrobid [nitrofurantoin monohydrate macrocrystals]  Social History     Tobacco Use   Smoking Status Never Smoker   Smokeless Tobacco Never Used      (If patient a smoker, smoking cessation counseling offered)     Social History     Substance and Sexual Activity   Alcohol Use No       REVIEW OF SYSTEMS:  Review of

## 2019-04-15 NOTE — PROGRESS NOTES
Review of Systems   Constitutional: Negative for appetite change, chills and fever. Eyes: Negative for pain, redness and visual disturbance. Respiratory: Negative for cough, shortness of breath and wheezing. Cardiovascular: Negative for chest pain and leg swelling. Gastrointestinal: Negative for abdominal pain, nausea and vomiting. Genitourinary: Positive for urgency (ONLY FIRST THING IN THE AM ). Negative for difficulty urinating, dysuria, flank pain, frequency, hematuria and vaginal discharge. Musculoskeletal: Positive for back pain (when she gets un tin the morning then she urinates and it goes away ). Negative for joint swelling and myalgias. Skin: Negative for color change, rash and wound. Neurological: Negative for dizziness, tremors and numbness. Hematological: Negative for adenopathy. Does not bruise/bleed easily.

## 2019-05-02 ENCOUNTER — TELEPHONE (OUTPATIENT)
Dept: UROLOGY | Age: 84
End: 2019-05-02

## 2019-05-13 ENCOUNTER — PROCEDURE VISIT (OUTPATIENT)
Dept: UROLOGY | Age: 84
End: 2019-05-13
Payer: COMMERCIAL

## 2019-05-13 VITALS — SYSTOLIC BLOOD PRESSURE: 165 MMHG | HEART RATE: 74 BPM | TEMPERATURE: 98.9 F | DIASTOLIC BLOOD PRESSURE: 81 MMHG

## 2019-05-13 DIAGNOSIS — R35.0 URINARY FREQUENCY: ICD-10-CM

## 2019-05-13 DIAGNOSIS — N39.41 URGE INCONTINENCE: ICD-10-CM

## 2019-05-13 DIAGNOSIS — R39.15 URGENCY OF URINATION: Primary | ICD-10-CM

## 2019-05-13 PROCEDURE — 64561 IMPLANT NEUROELECTRODES: CPT | Performed by: UROLOGY

## 2019-05-13 NOTE — PROGRESS NOTES
Procedure:  Interstim percutaneous sacral nerve stimulation test. Repeat of procedure on the opposite of the sacral nerve. Informed consent has been obtained. Risks and Benefits discussed prior to procedure. Description:  Patient was properly identified and placed in the prone position. Pillows were placed under the lower abdomen to flatten the sacrum and under the shins to allow the toes to dangle freely. [address] was placed on each buttock and pulled laterally to separate cheeks adequately to visual the anal sphincter. A ground pad was placed on the bottom of the patient's foot and the patient's cable was connected to the grounding pad and to the external stimulation box. Patient was prepped and draped in the usual sterile fashion using prep solution. Local injection of 2% Lidocaine was administered and the needle was placed at a 60 degree angle into the S3 foramen without difficulty. The proper S3 needle position was confirmed by the patient's sensation to stimulation, direct observation and lifting of the perineum and bellowing and observation of plantar flexion of the great toe utilizing the external test stimulator box and the J hook. The needle stylet was removed and the percutaneous lead was inserted. Lead placements was tested and confirmed by connecting the J hook to the top of the PNE lead. Using a push and pull technique the needle was taken out over the lead. The above procedure was performed again on the opposite side and all the appropriate responses were again verified. The patient cables were connected to the leads and grounding pads. The patient was cleaned off and the entire region was covered with a large tegaderm. The estimated blood loss was negligible. Using the external test stimulation,  the patient was programmed to the optimum sensation via the lead, and given instructions on utilizing the external test stimulator prior to discharge.   Urinary diary will be kept until the return appointment in my office to discuss results of this test stimulation    Agree with the ROS entered by the MA.

## 2019-05-17 ENCOUNTER — PROCEDURE VISIT (OUTPATIENT)
Dept: UROLOGY | Age: 84
End: 2019-05-17

## 2019-05-17 DIAGNOSIS — N39.46 MIXED STRESS AND URGE URINARY INCONTINENCE: Primary | ICD-10-CM

## 2019-05-17 PROCEDURE — 99024 POSTOP FOLLOW-UP VISIT: CPT | Performed by: NURSE PRACTITIONER

## 2019-05-17 NOTE — PROGRESS NOTES
Patient arrives for interstim lead pull removal. Tape and gauze removed from back, leads pulled with no complications. Patient to follow up with Dr. Dejesus Mom with continuation of bladder diary on 6/5/19.

## 2019-05-29 ENCOUNTER — OFFICE VISIT (OUTPATIENT)
Dept: PODIATRY | Age: 84
End: 2019-05-29
Payer: COMMERCIAL

## 2019-05-29 VITALS — BODY MASS INDEX: 40.57 KG/M2 | HEIGHT: 63 IN

## 2019-05-29 DIAGNOSIS — B35.1 DERMATOPHYTOSIS OF NAIL: Primary | ICD-10-CM

## 2019-05-29 DIAGNOSIS — E11.42 TYPE 2 DIABETES MELLITUS WITH DIABETIC POLYNEUROPATHY, WITHOUT LONG-TERM CURRENT USE OF INSULIN (HCC): ICD-10-CM

## 2019-05-29 PROCEDURE — 11721 DEBRIDE NAIL 6 OR MORE: CPT | Performed by: PODIATRIST

## 2019-05-29 PROCEDURE — 99999 PR OFFICE/OUTPT VISIT,PROCEDURE ONLY: CPT | Performed by: PODIATRIST

## 2019-05-29 NOTE — PROGRESS NOTES
[facility]  Return Patient    Chief Complaint   Patient presents with    Nail Problem     nail trim/last saw Rosaura Abraham 4/8/19    Diabetes     last a1c 6.3       Subjective: Sarah Prince comes to clinic for Nail Problem (nail trim/last saw Rosaura Abraham 4/8/19) and Diabetes (last a1c 6.3)  . Pt. Is a diabetic and states that she has been doing well. Pt currently has complaint of thickened, elongated nails that they cannot manage by themselves. Pt's primary care physician is Tyesha Philippe MD   Pt's last blood sugar was 134. Lab Results   Component Value Date    LABA1C 6.3 03/27/2019      Complains of numbness in the feet bilat.   Past Medical History:   Diagnosis Date    Diverticulosis     Hyperlipidemia     Hypertension     Hypokalemia     Iron deficiency anemia     Menopause     s/p hysterectomy    Osteoarthritis     bilat hands, hips, knees    S/P partial colectomy 2/2002    [facility] [address]     Thrombocytosis ([facility]     Type II or unspecified type diabetes mellitus without mention of complication, not stated as uncontrolled 02/2007    Unspecified sleep apnea 08/2004    uses bipap    UTI (lower urinary tract infection)     re-curring       Allergies   Allergen Reactions    Pcn [Penicillins] Shortness Of Breath    Demerol     Effexor [Venlafaxine] Other (See Comments)     Hallucinations     Lisinopril      Possible angioedema seen as supraglottitis    Macrobid [Nitrofurantoin Monohydrate Macrocrystals]      Wheezing, tight chest     Current Outpatient Medications on File Prior to Visit   Medication Sig Dispense Refill    trimethoprim (TRIMPEX) 100 MG tablet Take 1 tablet by mouth daily 30 tablet 11    oxybutynin (DITROPAN-XL) 5 MG extended release tablet Take 1 tablet by mouth daily 90 tablet 3    DULoxetine (CYMBALTA) 60 MG extended release capsule take 1 capsule by mouth once daily 30 capsule 11    atorvastatin (LIPITOR) 10 MG tablet take 1 tablet by mouth once daily 30 tablet 5    metFORMIN (GLUCOPHAGE) 500 MG tablet take 1 tablet by mouth every morning 30 tablet 11    Lancets MISC Please dispense according to patients insurance/device. Patient testing weekly, and PRN 50 each 3    blood glucose monitor strips Please supply patient with test strip that insurance covers, tests weekly and PRN. 50 strip 3    dipyridamole (PERSANTINE) 50 MG tablet take 2 tablets by mouth twice a day 120 tablet 11    glimepiride (AMARYL) 2 MG tablet Take 1 tablet by mouth every morning 90 tablet 3    levothyroxine (SYNTHROID) 50 MCG tablet take 1 tablet by mouth once daily 90 tablet 3    polyethylene glycol (GLYCOLAX) packet Take 17 g by mouth daily  0    budesonide-formoterol (SYMBICORT) 80-4.5 MCG/ACT AERO Inhale 2 puffs into the lungs 2 times daily 1 Inhaler 1    latanoprost (XALATAN) 0.005 % ophthalmic solution       cetirizine (ZYRTEC) 10 MG tablet Take 10 mg by mouth daily as needed.  Multiple Vitamin (THERA) TABS Take 1 tablet by mouth daily.  aspirin 81 MG EC tablet Take 81 mg by mouth daily. No current facility-administered medications on file prior to visit. ROS: No N/V/F/C, SOB, CP  Objective:  General: AAO x 3 in NAD.     Derm  Toenail Description  Sites of Onychomycosis Involvement (Check affected area)  [x] [x] [x] [x] [x] [x] [x] [x] [x] [x]  5 4 3 2 1 1 2 3 4 5                          Right                                        Left    Thickness  [x] [x] [x] [x] [x] [x] [x] [x] [x] [x]  5 4 3 2 1 1 2 3 4 5                         Right                                        Left    Dystrophic Changes   [x] [x] [x] [x] [x] [x] [x] [x] [x] [x]  5 4 3 2 1 1 2 3 4 5                         Right                                        Left    Color   [x] [x] [x] [x] [x] [x] [x] [x] [x] [x]  5 4 3 2 1 1 2 3 4 5                          Right                                        Left    Incurvation/Ingrowin [] [] [] [] [] [] [] [] [] []  5 4 3 2 1 1 2 3 4 5                         Right                                        Left    Inflammation/Pain   [] [] [] [] [] [] [] [] [] []  5 4 3 2 1 1 2 3 4 5                         Right                                        Left       Skin lesion/ulceration Absent . Skin No rashes or nodules noted. .      Vascular:  DP/PT pulses palpable 2/4, Bilateral.    CFT <3 seconds to digits 1-5, Bilateral .   Hair growth absent to level of digits, Bilateral.  Edema present, Bilateral.  Erythema absent, Bilateral.     DM with PVD       []Yes    [x]No    Neurological:  Sensation absent to light touch to level of digits, Bilateral.  Protective sensation absent via 5.07/10g [place] monofilament 10/10 sites, Bilateral.  Vibratory sensation absent to 1st MPJ, Bilateral.       Assessment:  82 y.o. female with:  1. Dermatophytosis of nail    2. Type 2 diabetes mellitus with diabetic polyneuropathy, without long-term current use of insulin ([facility] [address] [address]       Orders Placed This Encounter   Procedures     DIABETES FOOT EXAM    VT DEBRIDEMENT OF NAILS, 6 OR MORE           Q7   []Yes    []No                Q8   [x]Yes    []No                     Q9   []Yes    []No    Plan:   Pt was evaluated and examined. Patient was given personalized discharge instructions. Nails 1-10 were debrided sharply in length and thickness with a nipper and , without incident. Pt will follow up in 9 weeks or sooner if any problems arise. Diagnosis was discussed with the pt and all of their questions were answered in detail. Proper foot hygiene and care was discussed with the pt. Informed patient on proper diabetic foot care and importance of tight glycemic control. Patient to check feet daily and contact the office with any questions/problems/concerns. Other comorbidity noted and will be managed by PCP. Diabetic foot examination performed this visit.   The exam included neurological sensory exam, a 10-g monofilament and pinprick sensation, vibration using a 128-Hz tuning fork, ankle reflexes, visual skin inspection, vascular exam including assessment of pedal pulses, orthopedic exam for deformities, and shoe inspection. Increased risk factors noted on the diabetic foot exam include decreased sensory exam and peripheral neuropathy. Shoegear inspected and found to be appropriate size and wear.         Electronically signed by Kranthi Mccoy DPM on 5/29/2019 at 5:19 PM

## 2019-06-03 ENCOUNTER — TELEPHONE (OUTPATIENT)
Dept: FAMILY MEDICINE CLINIC | Age: 84
End: 2019-06-03

## 2019-06-05 ENCOUNTER — OFFICE VISIT (OUTPATIENT)
Dept: UROLOGY | Age: 84
End: 2019-06-05
Payer: COMMERCIAL

## 2019-06-05 VITALS
TEMPERATURE: 98.2 F | SYSTOLIC BLOOD PRESSURE: 132 MMHG | BODY MASS INDEX: 39.87 KG/M2 | HEIGHT: 63 IN | WEIGHT: 225 LBS | HEART RATE: 72 BPM | DIASTOLIC BLOOD PRESSURE: 64 MMHG

## 2019-06-05 DIAGNOSIS — R39.15 URGENCY OF URINATION: Primary | ICD-10-CM

## 2019-06-05 DIAGNOSIS — N39.41 URGE INCONTINENCE: ICD-10-CM

## 2019-06-05 DIAGNOSIS — R35.0 URINARY FREQUENCY: ICD-10-CM

## 2019-06-05 PROCEDURE — 99214 OFFICE O/P EST MOD 30 MIN: CPT | Performed by: UROLOGY

## 2019-06-05 ASSESSMENT — ENCOUNTER SYMPTOMS
NAUSEA: 0
EYE PAIN: 0
CONSTIPATION: 0
DIARRHEA: 0
WHEEZING: 0
COUGH: 1
VOMITING: 0
ABDOMINAL PAIN: 0
SHORTNESS OF BREATH: 0
BACK PAIN: 1
EYE REDNESS: 0

## 2019-06-05 NOTE — PROGRESS NOTES
Date    BUN 25 03/27/2019     Lab Results   Component Value Date    CREATININE 1.15 03/27/2019       Additional Lab/Culture results: none    PAST MEDICAL, FAMILY AND SOCIAL HISTORY UPDATE:  Past Medical History:   Diagnosis Date    Diverticulosis     Hyperlipidemia     Hypertension     Hypokalemia     Iron deficiency anemia     Menopause     s/p hysterectomy    Osteoarthritis     bilat hands, hips, knees    S/P partial colectomy 2/2002    Stroke ([address]     Thrombocytosis ([address]     Type II or unspecified type diabetes mellitus without mention of complication, not stated as uncontrolled 02/2007    Unspecified sleep apnea 08/2004    uses bipap    UTI (lower urinary tract infection)     re-curring     Past Surgical History:   Procedure Laterality Date    APPENDECTOMY  1993    CARDIOVASCULAR STRESS TEST  03/12    CARPAL TUNNEL RELEASE  1980's    bilat    CHOLECYSTECTOMY  1969    COLON SURGERY  1993    partial colectomy, d/t stricture    COLONOSCOPY  2001    CYSTOCELE REPAIR  1968    HERNIA REPAIR  09/27/2016    ventral/per Dr. Lula Lambert    partial, d/t uterine prolapse    [facility]    lumbar fusion with cement    TONSILLECTOMY  1947    UPPER GASTROINTESTINAL ENDOSCOPY  03/08/2018    Dr. Simona Beal, WNL     Family History   Problem Relation Age of Onset    Heart Attack Mother     High Blood Pressure Mother     Heart Attack Father     High Blood Pressure Father     Colon Cancer Sister     Diabetes Maternal Aunt     Cancer Other         mcousin, colon, breast ca     Outpatient Medications Marked as Taking for the 6/5/19 encounter (Office Visit) with Hedy Ruiz MD   Medication Sig Dispense Refill    metroNIDAZOLE (METROCREAM) 0.75 % cream Apply topically 2 times daily.  60 g 2    CARTIA  MG extended release capsule take 1 capsule by mouth once daily 90 capsule 3    trimethoprim (TRIMPEX) 100 MG tablet Take 1 tablet by mouth daily Body mass index is 39.86 kg/m². Patient is a 82 y.o. female in noacute distress and alert and oriented to person, place and time. Physical Exam  Constitutional: Patient in no acute distress. Neuro: Alert andoriented to person, place and time. Psych: Mood normal, affect normal  Skin: No rash noted  HEENT: Head: Normocephalic and atraumatic  Conjunctivae and EOM are normal. Pupils are equal, round  Nose: Normal  Right External Ear: Normal; Left External Ear: Normal  Mouth: Mucosa Moist  Neck: Supple  Lungs: Respiratory effort is normal  Cardiovascular: Warm & Pink  Abdomen: Soft, non-tender, non-distended with no CVA,  No flank tenderness,  Or hepatosplenomegaly   Lymphatics: No palpable lymphadenopathy. Bladder non-tender and not distended. Musculoskeletal: Normalgait and station      Assessment and Plan      1. Urgency of urination    2. Urge incontinence    3. Urinary frequency           Plan:   interstim stage 1 and 2 MAC      Prescriptions Ordered:  No orders of the defined types were placed in this encounter. Orders Placed:  No orders of the defined types were placed in this encounter. Jr Gamboa MD    Agree with the ROS entered by the MA.

## 2019-06-05 NOTE — PROGRESS NOTES
Review of Systems   Constitutional: Negative for appetite change, chills and fever. Eyes: Negative for pain, redness and visual disturbance. Respiratory: Positive for cough. Negative for shortness of breath and wheezing. Cardiovascular: Negative for chest pain and leg swelling. Gastrointestinal: Negative for abdominal pain, constipation, diarrhea, nausea and vomiting. Genitourinary: Positive for dysuria and urgency. Negative for difficulty urinating, flank pain, frequency and hematuria. Musculoskeletal: Positive for back pain. Negative for joint swelling and myalgias. Skin: Negative for rash and wound. Neurological: Negative for dizziness, tremors and numbness. Hematological: Does not bruise/bleed easily.

## 2019-06-11 ENCOUNTER — TELEPHONE (OUTPATIENT)
Dept: UROLOGY | Age: 84
End: 2019-06-11

## 2019-06-11 NOTE — TELEPHONE ENCOUNTER
Rcihard stg 1& 2 @ [facility] 7/9/19 7:30am   PAT @ [facility] 6/25/19 10:45am           Spoke with patient in office, procedure info emailed 6/11/19.

## 2019-06-28 DIAGNOSIS — E03.9 HYPOTHYROIDISM, UNSPECIFIED TYPE: ICD-10-CM

## 2019-06-29 RX ORDER — LEVOTHYROXINE SODIUM 0.05 MG/1
TABLET ORAL
Qty: 90 TABLET | Refills: 3 | Status: SHIPPED | OUTPATIENT
Start: 2019-06-29 | End: 2020-09-16 | Stop reason: SDUPTHER

## 2019-07-08 ENCOUNTER — OFFICE VISIT (OUTPATIENT)
Dept: FAMILY MEDICINE CLINIC | Age: 84
End: 2019-07-08
Payer: COMMERCIAL

## 2019-07-08 VITALS
OXYGEN SATURATION: 95 % | RESPIRATION RATE: 16 BRPM | BODY MASS INDEX: 39.68 KG/M2 | TEMPERATURE: 98.8 F | SYSTOLIC BLOOD PRESSURE: 130 MMHG | WEIGHT: 224 LBS | HEART RATE: 75 BPM | DIASTOLIC BLOOD PRESSURE: 70 MMHG

## 2019-07-08 DIAGNOSIS — J45.20 MILD INTERMITTENT ASTHMA WITHOUT COMPLICATION: ICD-10-CM

## 2019-07-08 DIAGNOSIS — F32.A DEPRESSION, UNSPECIFIED DEPRESSION TYPE: ICD-10-CM

## 2019-07-08 DIAGNOSIS — E11.8 TYPE 2 DIABETES MELLITUS WITH COMPLICATION, WITHOUT LONG-TERM CURRENT USE OF INSULIN (HCC): ICD-10-CM

## 2019-07-08 DIAGNOSIS — E78.00 PURE HYPERCHOLESTEROLEMIA: ICD-10-CM

## 2019-07-08 DIAGNOSIS — E03.9 HYPOTHYROIDISM, UNSPECIFIED TYPE: ICD-10-CM

## 2019-07-08 DIAGNOSIS — K21.9 GASTROESOPHAGEAL REFLUX DISEASE WITHOUT ESOPHAGITIS: ICD-10-CM

## 2019-07-08 DIAGNOSIS — F41.9 ANXIETY: ICD-10-CM

## 2019-07-08 DIAGNOSIS — D50.9 IRON DEFICIENCY ANEMIA, UNSPECIFIED IRON DEFICIENCY ANEMIA TYPE: ICD-10-CM

## 2019-07-08 DIAGNOSIS — Z86.73 HISTORY OF CVA (CEREBROVASCULAR ACCIDENT): ICD-10-CM

## 2019-07-08 DIAGNOSIS — N18.30 CKD (CHRONIC KIDNEY DISEASE) STAGE 3, GFR 30-59 ML/MIN (HCC): ICD-10-CM

## 2019-07-08 DIAGNOSIS — R53.83 FATIGUE, UNSPECIFIED TYPE: ICD-10-CM

## 2019-07-08 DIAGNOSIS — N39.41 URGE INCONTINENCE: ICD-10-CM

## 2019-07-08 DIAGNOSIS — I10 ESSENTIAL HYPERTENSION: Primary | ICD-10-CM

## 2019-07-08 LAB — HBA1C MFR BLD: 6.2 %

## 2019-07-08 PROCEDURE — 83036 HEMOGLOBIN GLYCOSYLATED A1C: CPT | Performed by: FAMILY MEDICINE

## 2019-07-08 PROCEDURE — 99214 OFFICE O/P EST MOD 30 MIN: CPT | Performed by: FAMILY MEDICINE

## 2019-07-08 ASSESSMENT — ENCOUNTER SYMPTOMS
BACK PAIN: 0
CONSTIPATION: 0
COUGH: 0
NAUSEA: 0
ABDOMINAL DISTENTION: 0
VOMITING: 0
SHORTNESS OF BREATH: 0
DIARRHEA: 0
RHINORRHEA: 0
ABDOMINAL PAIN: 0
SORE THROAT: 0
CHEST TIGHTNESS: 0

## 2019-07-08 NOTE — PROGRESS NOTES
round, and reactive to light. Conjunctivae and EOM are normal.   Neck: Normal range of motion. Cardiovascular: Normal rate, regular rhythm, normal heart sounds and intact distal pulses. No murmur heard. Pulmonary/Chest: Effort normal and breath sounds normal. No respiratory distress. She has no wheezes. She exhibits no tenderness. Abdominal: Soft. Bowel sounds are normal. She exhibits no distension and no mass. There is no tenderness. Musculoskeletal: Normal range of motion. She exhibits no edema or tenderness. Lymphadenopathy:     She has no cervical adenopathy. Neurological: She is alert and oriented to person, place, and time. She has normal reflexes. She exhibits abnormal muscle tone (left side). Skin: No rash noted. Psychiatric: She has a normal mood and affect. Her behavior is normal.   Vitals reviewed. Labs reviewed with pt today. HGBA1C done in the office was 6.2%    Assessment:       Diagnosis Orders   1. Essential hypertension  CBC    Comprehensive Metabolic Panel   2. Pure hypercholesterolemia  Lipid Panel   3. Type 2 diabetes mellitus with complication, without long-term current use of insulin ([facility])  Comprehensive Metabolic Panel    Hemoglobin A1C    POCT glycosylated hemoglobin (Hb A1C)   4. Hypothyroidism, unspecified type  T4, Free    TSH without Reflex   5. CKD (chronic kidney disease) stage 3, GFR 30-59 ml/min ([facility])  Comprehensive Metabolic Panel   6. Iron deficiency anemia, unspecified iron deficiency anemia type  CBC   7. Depression, unspecified depression type     8. Mild intermittent asthma without complication     9. Gastroesophageal reflux disease without esophagitis     10. Generalized anxiety/depression. 11. Urge incontinence     12. History of CVA (cerebrovascular accident)     15.  Fatigue, unspecified type  Vitamin B12         Plan:      Orders Placed This Encounter   Procedures    CBC     Standing Status:   Future     Standing Expiration Date:   7/7/2020   [facility]

## 2019-07-23 ENCOUNTER — TELEPHONE (OUTPATIENT)
Dept: UROLOGY | Age: 84
End: 2019-07-23

## 2019-08-28 ENCOUNTER — OFFICE VISIT (OUTPATIENT)
Dept: PODIATRY | Age: 84
End: 2019-08-28
Payer: COMMERCIAL

## 2019-08-28 VITALS — BODY MASS INDEX: 35.44 KG/M2 | HEIGHT: 63 IN | WEIGHT: 200 LBS

## 2019-08-28 DIAGNOSIS — B35.1 DERMATOPHYTOSIS OF NAIL: Primary | ICD-10-CM

## 2019-08-28 DIAGNOSIS — E11.42 TYPE 2 DIABETES MELLITUS WITH DIABETIC POLYNEUROPATHY, WITHOUT LONG-TERM CURRENT USE OF INSULIN (HCC): ICD-10-CM

## 2019-08-28 PROCEDURE — 11721 DEBRIDE NAIL 6 OR MORE: CPT | Performed by: PODIATRIST

## 2019-08-28 PROCEDURE — 99999 PR OFFICE/OUTPT VISIT,PROCEDURE ONLY: CPT | Performed by: PODIATRIST

## 2019-08-29 NOTE — PROGRESS NOTES
Left    Incurvation/Ingrowin   [] [] [] [] [] [] [] [] [] []  5 4 3 2 1 1 2 3 4 5                         Right                                        Left    Inflammation/Pain   [] [] [] [] [] [] [] [] [] []  5 4 3 2 1 1 2 3 4 5                         Right                                        Left       Skin lesion/ulceration Absent . Skin No rashes or nodules noted. .      Vascular:  DP/PT pulses palpable 2/4, Bilateral.    CFT <3 seconds to digits 1-5, Bilateral .   Hair growth absent to level of digits, Bilateral.  Edema present, Bilateral.  Erythema absent, Bilateral.     DM with PVD       []Yes    [x]No    Neurological:  Sensation absent to light touch to level of digits, Bilateral.  Protective sensation absent via 5.07/10g [place] monofilament 10/10 sites, Bilateral.  Vibratory sensation absent to 1st MPJ, Bilateral.       Assessment:  82 y.o. female with:  1. Dermatophytosis of nail    2. Type 2 diabetes mellitus with diabetic polyneuropathy, without long-term current use of insulin ([address]       Orders Placed This Encounter   Procedures    NV DEBRIDEMENT OF NAILS, 6 OR MORE           Q7   []Yes    []No                Q8   [x]Yes    []No                     Q9   []Yes    []No    Plan:   Pt was evaluated and examined. Patient was given personalized discharge instructions. Nails 1-10 were debrided sharply in length and thickness with a nipper and , without incident. Pt will follow up in 9 weeks or sooner if any problems arise. Diagnosis was discussed with the pt and all of their questions were answered in detail. Proper foot hygiene and care was discussed with the pt. Informed patient on proper diabetic foot care and importance of tight glycemic control. Patient to check feet daily and contact the office with any questions/problems/concerns. Other comorbidity noted and will be managed by PCP. Diabetic foot examination performed this visit.   The exam included neurological sensory exam, a 10-g monofilament and pinprick sensation, vibration using a 128-Hz tuning fork, ankle reflexes, visual skin inspection, vascular exam including assessment of pedal pulses, orthopedic exam for deformities, and shoe inspection. Increased risk factors noted on the diabetic foot exam include decreased sensory exam and peripheral neuropathy. Shoegear inspected and found to be appropriate size and wear.         Electronically signed by Kita Payne DPM on 8/29/2019 at 7:19 AM

## 2019-09-25 RX ORDER — GLIMEPIRIDE 2 MG/1
2 TABLET ORAL EVERY MORNING
Qty: 90 TABLET | Refills: 3 | Status: SHIPPED | OUTPATIENT
Start: 2019-09-25 | End: 2020-05-14 | Stop reason: SDUPTHER

## 2019-10-04 DIAGNOSIS — F41.9 ANXIETY: ICD-10-CM

## 2019-10-04 RX ORDER — LORAZEPAM 0.5 MG/1
0.5 TABLET ORAL 2 TIMES DAILY PRN
Qty: 60 TABLET | Refills: 0 | Status: SHIPPED | OUTPATIENT
Start: 2019-10-04 | End: 2020-10-01 | Stop reason: SDUPTHER

## 2019-10-18 ENCOUNTER — OFFICE VISIT (OUTPATIENT)
Dept: FAMILY MEDICINE CLINIC | Age: 84
End: 2019-10-18
Payer: COMMERCIAL

## 2019-10-18 VITALS
OXYGEN SATURATION: 94 % | BODY MASS INDEX: 38.97 KG/M2 | DIASTOLIC BLOOD PRESSURE: 70 MMHG | SYSTOLIC BLOOD PRESSURE: 138 MMHG | HEART RATE: 70 BPM | TEMPERATURE: 98.8 F | WEIGHT: 220 LBS

## 2019-10-18 DIAGNOSIS — R41.3 MEMORY LOSS: ICD-10-CM

## 2019-10-18 DIAGNOSIS — E78.00 PURE HYPERCHOLESTEROLEMIA: ICD-10-CM

## 2019-10-18 DIAGNOSIS — D50.9 IRON DEFICIENCY ANEMIA, UNSPECIFIED IRON DEFICIENCY ANEMIA TYPE: ICD-10-CM

## 2019-10-18 DIAGNOSIS — N18.30 CKD (CHRONIC KIDNEY DISEASE) STAGE 3, GFR 30-59 ML/MIN (HCC): ICD-10-CM

## 2019-10-18 DIAGNOSIS — I10 ESSENTIAL HYPERTENSION: Primary | ICD-10-CM

## 2019-10-18 DIAGNOSIS — F41.9 ANXIETY: ICD-10-CM

## 2019-10-18 DIAGNOSIS — E53.8 B12 DEFICIENCY: ICD-10-CM

## 2019-10-18 DIAGNOSIS — J45.20 MILD INTERMITTENT ASTHMA WITHOUT COMPLICATION: ICD-10-CM

## 2019-10-18 DIAGNOSIS — N39.41 URGE INCONTINENCE: ICD-10-CM

## 2019-10-18 DIAGNOSIS — N30.20 CYSTITIS, CHRONIC: ICD-10-CM

## 2019-10-18 DIAGNOSIS — F32.A DEPRESSION, UNSPECIFIED DEPRESSION TYPE: ICD-10-CM

## 2019-10-18 DIAGNOSIS — Z23 FLU VACCINE NEED: ICD-10-CM

## 2019-10-18 DIAGNOSIS — E03.9 HYPOTHYROIDISM, UNSPECIFIED TYPE: ICD-10-CM

## 2019-10-18 DIAGNOSIS — E11.8 TYPE 2 DIABETES MELLITUS WITH COMPLICATION, WITHOUT LONG-TERM CURRENT USE OF INSULIN (HCC): ICD-10-CM

## 2019-10-18 PROCEDURE — 99214 OFFICE O/P EST MOD 30 MIN: CPT | Performed by: FAMILY MEDICINE

## 2019-10-18 PROCEDURE — 90653 IIV ADJUVANT VACCINE IM: CPT | Performed by: FAMILY MEDICINE

## 2019-10-18 PROCEDURE — 96372 THER/PROPH/DIAG INJ SC/IM: CPT | Performed by: FAMILY MEDICINE

## 2019-10-18 PROCEDURE — G0008 ADMIN INFLUENZA VIRUS VAC: HCPCS | Performed by: FAMILY MEDICINE

## 2019-10-18 RX ORDER — CYANOCOBALAMIN 1000 UG/ML
1000 INJECTION INTRAMUSCULAR; SUBCUTANEOUS ONCE
Status: COMPLETED | OUTPATIENT
Start: 2019-10-18 | End: 2019-10-18

## 2019-10-18 RX ADMIN — CYANOCOBALAMIN 1000 MCG: 1000 INJECTION INTRAMUSCULAR; SUBCUTANEOUS at 15:37

## 2019-10-18 ASSESSMENT — ENCOUNTER SYMPTOMS
ABDOMINAL PAIN: 0
BACK PAIN: 0
NAUSEA: 0
VOMITING: 0
RHINORRHEA: 0
CONSTIPATION: 0
CHEST TIGHTNESS: 0
COUGH: 0
SORE THROAT: 0
DIARRHEA: 0
ABDOMINAL DISTENTION: 0
SHORTNESS OF BREATH: 1

## 2019-11-11 ENCOUNTER — OFFICE VISIT (OUTPATIENT)
Dept: UROLOGY | Age: 84
End: 2019-11-11
Payer: COMMERCIAL

## 2019-11-11 VITALS
HEART RATE: 59 BPM | BODY MASS INDEX: 38.98 KG/M2 | WEIGHT: 220.02 LBS | TEMPERATURE: 98.2 F | SYSTOLIC BLOOD PRESSURE: 159 MMHG | DIASTOLIC BLOOD PRESSURE: 83 MMHG | HEIGHT: 63 IN

## 2019-11-11 DIAGNOSIS — R35.1 NOCTURIA: ICD-10-CM

## 2019-11-11 DIAGNOSIS — R39.15 URGENCY OF URINATION: Primary | ICD-10-CM

## 2019-11-11 DIAGNOSIS — N39.41 URGE INCONTINENCE: ICD-10-CM

## 2019-11-11 DIAGNOSIS — R35.0 URINARY FREQUENCY: ICD-10-CM

## 2019-11-11 PROCEDURE — 99214 OFFICE O/P EST MOD 30 MIN: CPT | Performed by: UROLOGY

## 2019-11-11 ASSESSMENT — ENCOUNTER SYMPTOMS
WHEEZING: 0
ABDOMINAL PAIN: 0
CONSTIPATION: 0
EYE REDNESS: 0
EYE PAIN: 0
COUGH: 0
BACK PAIN: 0
DIARRHEA: 0
VOMITING: 0
SHORTNESS OF BREATH: 0
NAUSEA: 0

## 2019-11-27 ENCOUNTER — OFFICE VISIT (OUTPATIENT)
Dept: PODIATRY | Age: 84
End: 2019-11-27
Payer: COMMERCIAL

## 2019-11-27 VITALS — HEIGHT: 63 IN | WEIGHT: 220 LBS | BODY MASS INDEX: 38.98 KG/M2

## 2019-11-27 DIAGNOSIS — B35.1 DERMATOPHYTOSIS OF NAIL: Primary | ICD-10-CM

## 2019-11-27 DIAGNOSIS — E11.42 TYPE 2 DIABETES MELLITUS WITH DIABETIC POLYNEUROPATHY, WITHOUT LONG-TERM CURRENT USE OF INSULIN (HCC): ICD-10-CM

## 2019-11-27 PROCEDURE — 99999 PR OFFICE/OUTPT VISIT,PROCEDURE ONLY: CPT | Performed by: PODIATRIST

## 2019-11-27 PROCEDURE — 11721 DEBRIDE NAIL 6 OR MORE: CPT | Performed by: PODIATRIST

## 2020-01-08 RX ORDER — BUDESONIDE AND FORMOTEROL FUMARATE DIHYDRATE 80; 4.5 UG/1; UG/1
2 AEROSOL RESPIRATORY (INHALATION) 2 TIMES DAILY
Qty: 1 INHALER | Refills: 11 | Status: SHIPPED | OUTPATIENT
Start: 2020-01-08 | End: 2020-10-06 | Stop reason: SDUPTHER

## 2020-01-28 ENCOUNTER — TELEPHONE (OUTPATIENT)
Dept: FAMILY MEDICINE CLINIC | Age: 85
End: 2020-01-28

## 2020-01-28 ENCOUNTER — HOSPITAL ENCOUNTER (OUTPATIENT)
Age: 85
Setting detail: SPECIMEN
Discharge: HOME | End: 2020-01-28
Payer: COMMERCIAL

## 2020-01-28 LAB
ANION GAP SERPL CALCULATED.3IONS-SCNC: 13 MMOL/L (ref 9–17)
BUN BLDV-MCNC: 11 MG/DL (ref 8–23)
BUN/CREAT BLD: 14 (ref 9–20)
CALCIUM SERPL-MCNC: 8.9 MG/DL (ref 8.6–10.4)
CHLORIDE BLD-SCNC: 100 MMOL/L (ref 98–107)
CO2: 22 MMOL/L (ref 20–31)
CREAT SERPL-MCNC: 0.77 MG/DL (ref 0.5–0.9)
GFR AFRICAN AMERICAN: >60 ML/MIN
GFR NON-AFRICAN AMERICAN: >60 ML/MIN
GFR SERPL CREATININE-BSD FRML MDRD: ABNORMAL ML/MIN/{1.73_M2}
GFR SERPL CREATININE-BSD FRML MDRD: ABNORMAL ML/MIN/{1.73_M2}
GLUCOSE BLD-MCNC: 120 MG/DL (ref 70–99)
HCT VFR BLD CALC: 26.1 % (ref 36.3–47.1)
HEMOGLOBIN: 8 G/DL (ref 11.9–15.1)
MCH RBC QN AUTO: 29.5 PG (ref 25.2–33.5)
MCHC RBC AUTO-ENTMCNC: 30.7 G/DL (ref 28.4–34.8)
MCV RBC AUTO: 96.3 FL (ref 82.6–102.9)
NRBC AUTOMATED: 0 PER 100 WBC
PDW BLD-RTO: 14.2 % (ref 11.8–14.4)
PLATELET # BLD: 627 K/UL (ref 138–453)
PMV BLD AUTO: 9.5 FL (ref 8.1–13.5)
POTASSIUM SERPL-SCNC: 3.6 MMOL/L (ref 3.7–5.3)
RBC # BLD: 2.71 M/UL (ref 3.95–5.11)
SODIUM BLD-SCNC: 135 MMOL/L (ref 135–144)
WBC # BLD: 13.2 K/UL (ref 3.5–11.3)

## 2020-01-28 PROCEDURE — 85027 COMPLETE CBC AUTOMATED: CPT

## 2020-01-28 PROCEDURE — P9603 ONE-WAY ALLOW PRORATED MILES: HCPCS

## 2020-01-28 PROCEDURE — 36415 COLL VENOUS BLD VENIPUNCTURE: CPT

## 2020-01-28 PROCEDURE — 80048 BASIC METABOLIC PNL TOTAL CA: CPT

## 2020-01-28 NOTE — TELEPHONE ENCOUNTER
Patients son called back to say the patient was [address] on 01/26/2020, but went to [facility] and is probably going to be there for a while. He will call when she is discharged from there.

## 2020-01-29 ENCOUNTER — HOSPITAL ENCOUNTER (OUTPATIENT)
Age: 85
Setting detail: SPECIMEN
Discharge: HOME | End: 2020-01-29
Payer: COMMERCIAL

## 2020-01-29 LAB
HCT VFR BLD CALC: 27.2 % (ref 36.3–47.1)
HEMOGLOBIN: 8.2 G/DL (ref 11.9–15.1)
MCH RBC QN AUTO: 29.3 PG (ref 25.2–33.5)
MCHC RBC AUTO-ENTMCNC: 30.1 G/DL (ref 28.4–34.8)
MCV RBC AUTO: 97.1 FL (ref 82.6–102.9)
NRBC AUTOMATED: 0 PER 100 WBC
PDW BLD-RTO: 14.2 % (ref 11.8–14.4)
PLATELET # BLD: 646 K/UL (ref 138–453)
PMV BLD AUTO: 9.3 FL (ref 8.1–13.5)
RBC # BLD: 2.8 M/UL (ref 3.95–5.11)
WBC # BLD: 13.8 K/UL (ref 3.5–11.3)

## 2020-01-29 PROCEDURE — 85027 COMPLETE CBC AUTOMATED: CPT

## 2020-01-29 PROCEDURE — 36415 COLL VENOUS BLD VENIPUNCTURE: CPT

## 2020-01-29 PROCEDURE — P9603 ONE-WAY ALLOW PRORATED MILES: HCPCS

## 2020-02-03 ENCOUNTER — HOSPITAL ENCOUNTER (OUTPATIENT)
Age: 85
Setting detail: SPECIMEN
Discharge: HOME | End: 2020-02-03
Payer: COMMERCIAL

## 2020-02-03 LAB
HCT VFR BLD CALC: 28.1 % (ref 36.3–47.1)
HEMOGLOBIN: 8.5 G/DL (ref 11.9–15.1)
MCH RBC QN AUTO: 28.9 PG (ref 25.2–33.5)
MCHC RBC AUTO-ENTMCNC: 30.2 G/DL (ref 28.4–34.8)
MCV RBC AUTO: 95.6 FL (ref 82.6–102.9)
NRBC AUTOMATED: 0 PER 100 WBC
PDW BLD-RTO: 13.9 % (ref 11.8–14.4)
PLATELET # BLD: 673 K/UL (ref 138–453)
PMV BLD AUTO: 9.5 FL (ref 8.1–13.5)
RBC # BLD: 2.94 M/UL (ref 3.95–5.11)
WBC # BLD: 13.3 K/UL (ref 3.5–11.3)

## 2020-02-03 PROCEDURE — 36415 COLL VENOUS BLD VENIPUNCTURE: CPT

## 2020-02-03 PROCEDURE — P9603 ONE-WAY ALLOW PRORATED MILES: HCPCS

## 2020-02-03 PROCEDURE — 85027 COMPLETE CBC AUTOMATED: CPT

## 2020-02-07 ENCOUNTER — HOSPITAL ENCOUNTER (OUTPATIENT)
Age: 85
Setting detail: SPECIMEN
Discharge: HOME | End: 2020-02-07
Payer: COMMERCIAL

## 2020-02-07 LAB
ABSOLUTE EOS #: 0.57 K/UL (ref 0–0.4)
ABSOLUTE IMMATURE GRANULOCYTE: 0.57 K/UL (ref 0–0.3)
ABSOLUTE LYMPH #: 1.48 K/UL (ref 1–4.8)
ABSOLUTE MONO #: 1.37 K/UL (ref 0.1–0.8)
BASOPHILS # BLD: 0 % (ref 0–2)
BASOPHILS ABSOLUTE: 0 K/UL (ref 0–0.2)
DIFFERENTIAL TYPE: ABNORMAL
EOSINOPHILS RELATIVE PERCENT: 5 % (ref 1–4)
HCT VFR BLD CALC: 27.3 % (ref 36.3–47.1)
HEMOGLOBIN: 8.1 G/DL (ref 11.9–15.1)
IMMATURE GRANULOCYTES: 5 %
LYMPHOCYTES # BLD: 13 % (ref 24–44)
MCH RBC QN AUTO: 28.4 PG (ref 25.2–33.5)
MCHC RBC AUTO-ENTMCNC: 29.7 G/DL (ref 28.4–34.8)
MCV RBC AUTO: 95.8 FL (ref 82.6–102.9)
MONOCYTES # BLD: 12 % (ref 1–7)
MORPHOLOGY: NORMAL
NRBC AUTOMATED: 0 PER 100 WBC
PDW BLD-RTO: 14.2 % (ref 11.8–14.4)
PLATELET # BLD: 665 K/UL (ref 138–453)
PLATELET ESTIMATE: ABNORMAL
PMV BLD AUTO: 9.5 FL (ref 8.1–13.5)
RBC # BLD: 2.85 M/UL (ref 3.95–5.11)
RBC # BLD: ABNORMAL 10*6/UL
SEG NEUTROPHILS: 65 % (ref 36–66)
SEGMENTED NEUTROPHILS ABSOLUTE COUNT: 7.41 K/UL (ref 1.8–7.7)
WBC # BLD: 11.4 K/UL (ref 3.5–11.3)
WBC # BLD: ABNORMAL 10*3/UL

## 2020-02-07 PROCEDURE — P9603 ONE-WAY ALLOW PRORATED MILES: HCPCS

## 2020-02-07 PROCEDURE — 36415 COLL VENOUS BLD VENIPUNCTURE: CPT

## 2020-02-07 PROCEDURE — 85025 COMPLETE CBC W/AUTO DIFF WBC: CPT

## 2020-02-10 ENCOUNTER — HOSPITAL ENCOUNTER (OUTPATIENT)
Age: 85
Setting detail: SPECIMEN
Discharge: HOME | End: 2020-02-10
Payer: COMMERCIAL

## 2020-02-10 LAB
ABSOLUTE EOS #: 0.32 K/UL (ref 0–0.4)
ABSOLUTE IMMATURE GRANULOCYTE: 0.32 K/UL (ref 0–0.3)
ABSOLUTE LYMPH #: 1.28 K/UL (ref 1–4.8)
ABSOLUTE MONO #: 1.18 K/UL (ref 0.1–0.8)
BASOPHILS # BLD: 0 % (ref 0–2)
BASOPHILS ABSOLUTE: 0 K/UL (ref 0–0.2)
DIFFERENTIAL TYPE: ABNORMAL
EOSINOPHILS RELATIVE PERCENT: 3 % (ref 1–4)
HCT VFR BLD CALC: 28.2 % (ref 36.3–47.1)
HEMOGLOBIN: 8.2 G/DL (ref 11.9–15.1)
IMMATURE GRANULOCYTES: 3 %
LYMPHOCYTES # BLD: 12 % (ref 24–44)
MCH RBC QN AUTO: 28.3 PG (ref 25.2–33.5)
MCHC RBC AUTO-ENTMCNC: 29.1 G/DL (ref 28.4–34.8)
MCV RBC AUTO: 97.2 FL (ref 82.6–102.9)
MONOCYTES # BLD: 11 % (ref 1–7)
MORPHOLOGY: ABNORMAL
NRBC AUTOMATED: 0 PER 100 WBC
PDW BLD-RTO: 14.5 % (ref 11.8–14.4)
PLATELET # BLD: 657 K/UL (ref 138–453)
PLATELET ESTIMATE: ABNORMAL
PMV BLD AUTO: 9.4 FL (ref 8.1–13.5)
RBC # BLD: 2.9 M/UL (ref 3.95–5.11)
RBC # BLD: ABNORMAL 10*6/UL
SEG NEUTROPHILS: 71 % (ref 36–66)
SEGMENTED NEUTROPHILS ABSOLUTE COUNT: 7.6 K/UL (ref 1.8–7.7)
WBC # BLD: 10.7 K/UL (ref 3.5–11.3)
WBC # BLD: ABNORMAL 10*3/UL

## 2020-02-10 PROCEDURE — 85025 COMPLETE CBC W/AUTO DIFF WBC: CPT

## 2020-02-10 PROCEDURE — 36415 COLL VENOUS BLD VENIPUNCTURE: CPT

## 2020-02-10 PROCEDURE — P9603 ONE-WAY ALLOW PRORATED MILES: HCPCS

## 2020-02-13 ENCOUNTER — HOSPITAL ENCOUNTER (OUTPATIENT)
Age: 85
Setting detail: SPECIMEN
Discharge: HOME | End: 2020-02-13
Payer: COMMERCIAL

## 2020-02-13 LAB
ABSOLUTE EOS #: 0 K/UL (ref 0–0.44)
ABSOLUTE IMMATURE GRANULOCYTE: 0.92 K/UL (ref 0–0.3)
ABSOLUTE LYMPH #: 1.83 K/UL (ref 1.1–3.7)
ABSOLUTE MONO #: 1.6 K/UL (ref 0.1–1.2)
ANION GAP SERPL CALCULATED.3IONS-SCNC: 16 MMOL/L (ref 9–17)
BASOPHILS # BLD: 0 % (ref 0–2)
BASOPHILS ABSOLUTE: 0 K/UL (ref 0–0.2)
BUN BLDV-MCNC: 91 MG/DL (ref 8–23)
BUN/CREAT BLD: 47 (ref 9–20)
CALCIUM SERPL-MCNC: 10.7 MG/DL (ref 8.6–10.4)
CHLORIDE BLD-SCNC: 103 MMOL/L (ref 98–107)
CO2: 18 MMOL/L (ref 20–31)
CREAT SERPL-MCNC: 1.93 MG/DL (ref 0.5–0.9)
DIFFERENTIAL TYPE: ABNORMAL
EOSINOPHILS RELATIVE PERCENT: 0 % (ref 1–4)
GFR AFRICAN AMERICAN: 30 ML/MIN
GFR NON-AFRICAN AMERICAN: 25 ML/MIN
GFR SERPL CREATININE-BSD FRML MDRD: ABNORMAL ML/MIN/{1.73_M2}
GFR SERPL CREATININE-BSD FRML MDRD: ABNORMAL ML/MIN/{1.73_M2}
GLUCOSE BLD-MCNC: 228 MG/DL (ref 70–99)
HCT VFR BLD CALC: 22.5 % (ref 36.3–47.1)
HEMOGLOBIN: 6.7 G/DL (ref 11.9–15.1)
IMMATURE GRANULOCYTES: 4 %
LYMPHOCYTES # BLD: 8 % (ref 24–43)
MCH RBC QN AUTO: 28.5 PG (ref 25.2–33.5)
MCHC RBC AUTO-ENTMCNC: 29.8 G/DL (ref 28.4–34.8)
MCV RBC AUTO: 95.7 FL (ref 82.6–102.9)
MONOCYTES # BLD: 7 % (ref 3–12)
NRBC AUTOMATED: 1.4 PER 100 WBC
PDW BLD-RTO: 15.3 % (ref 11.8–14.4)
PLATELET # BLD: 576 K/UL (ref 138–453)
PLATELET ESTIMATE: ABNORMAL
PMV BLD AUTO: 9.5 FL (ref 8.1–13.5)
POTASSIUM SERPL-SCNC: 4.7 MMOL/L (ref 3.7–5.3)
RBC # BLD: 2.35 M/UL (ref 3.95–5.11)
RBC # BLD: ABNORMAL 10*6/UL
SEG NEUTROPHILS: 81 % (ref 36–65)
SEGMENTED NEUTROPHILS ABSOLUTE COUNT: 18.55 K/UL (ref 1.5–8.1)
SODIUM BLD-SCNC: 137 MMOL/L (ref 135–144)
WBC # BLD: 22.9 K/UL (ref 3.5–11.3)
WBC # BLD: ABNORMAL 10*3/UL

## 2020-02-13 PROCEDURE — 36415 COLL VENOUS BLD VENIPUNCTURE: CPT

## 2020-02-13 PROCEDURE — 85025 COMPLETE CBC W/AUTO DIFF WBC: CPT

## 2020-02-13 PROCEDURE — P9603 ONE-WAY ALLOW PRORATED MILES: HCPCS

## 2020-02-13 PROCEDURE — 80048 BASIC METABOLIC PNL TOTAL CA: CPT

## 2020-02-24 ENCOUNTER — TELEPHONE (OUTPATIENT)
Dept: FAMILY MEDICINE CLINIC | Age: 85
End: 2020-02-24

## 2020-02-24 ENCOUNTER — HOSPITAL ENCOUNTER (OUTPATIENT)
Age: 85
Setting detail: SPECIMEN
Discharge: HOME | End: 2020-02-24
Payer: COMMERCIAL

## 2020-02-24 LAB
ANION GAP SERPL CALCULATED.3IONS-SCNC: 12 MMOL/L (ref 9–17)
BUN BLDV-MCNC: 13 MG/DL (ref 8–23)
BUN/CREAT BLD: ABNORMAL (ref 9–20)
CALCIUM SERPL-MCNC: 10 MG/DL (ref 8.6–10.4)
CHLORIDE BLD-SCNC: 100 MMOL/L (ref 98–107)
CO2: 23 MMOL/L (ref 20–31)
CREAT SERPL-MCNC: 0.78 MG/DL (ref 0.5–0.9)
GFR AFRICAN AMERICAN: >60 ML/MIN
GFR NON-AFRICAN AMERICAN: >60 ML/MIN
GFR SERPL CREATININE-BSD FRML MDRD: ABNORMAL ML/MIN/{1.73_M2}
GFR SERPL CREATININE-BSD FRML MDRD: ABNORMAL ML/MIN/{1.73_M2}
GLUCOSE BLD-MCNC: 144 MG/DL (ref 70–99)
HCT VFR BLD CALC: 26.8 % (ref 36.3–47.1)
HEMOGLOBIN: 7.9 G/DL (ref 11.9–15.1)
MAGNESIUM: 1.3 MG/DL (ref 1.6–2.6)
MCH RBC QN AUTO: 30 PG (ref 25.2–33.5)
MCHC RBC AUTO-ENTMCNC: 29.5 G/DL (ref 28.4–34.8)
MCV RBC AUTO: 101.9 FL (ref 82.6–102.9)
NRBC AUTOMATED: 0 PER 100 WBC
PDW BLD-RTO: 18.5 % (ref 11.8–14.4)
PLATELET # BLD: 464 K/UL (ref 138–453)
PMV BLD AUTO: 9.4 FL (ref 8.1–13.5)
POTASSIUM SERPL-SCNC: 3.4 MMOL/L (ref 3.7–5.3)
RBC # BLD: 2.63 M/UL (ref 3.95–5.11)
SODIUM BLD-SCNC: 135 MMOL/L (ref 135–144)
WBC # BLD: 8.7 K/UL (ref 3.5–11.3)

## 2020-02-24 PROCEDURE — 83735 ASSAY OF MAGNESIUM: CPT

## 2020-02-24 PROCEDURE — P9603 ONE-WAY ALLOW PRORATED MILES: HCPCS

## 2020-02-24 PROCEDURE — 36415 COLL VENOUS BLD VENIPUNCTURE: CPT

## 2020-02-24 PROCEDURE — 85027 COMPLETE CBC AUTOMATED: CPT

## 2020-02-24 PROCEDURE — 80048 BASIC METABOLIC PNL TOTAL CA: CPT

## 2020-02-24 NOTE — TELEPHONE ENCOUNTER
Received notification that the patient was DC'd from [facility] so I did call her son to verify that she went back to [facility], which she did. He said she is doing a lot better now.

## 2020-02-28 ENCOUNTER — HOSPITAL ENCOUNTER (OUTPATIENT)
Age: 85
Setting detail: SPECIMEN
Discharge: HOME | End: 2020-02-28
Payer: COMMERCIAL

## 2020-02-28 LAB
HCT VFR BLD CALC: 25.3 % (ref 36.3–47.1)
HEMOGLOBIN: 7.7 G/DL (ref 11.9–15.1)
MAGNESIUM: 1.4 MG/DL (ref 1.6–2.6)
MCH RBC QN AUTO: 30.4 PG (ref 25.2–33.5)
MCHC RBC AUTO-ENTMCNC: 30.4 G/DL (ref 28.4–34.8)
MCV RBC AUTO: 100 FL (ref 82.6–102.9)
NRBC AUTOMATED: 0 PER 100 WBC
PDW BLD-RTO: 17.1 % (ref 11.8–14.4)
PLATELET # BLD: 394 K/UL (ref 138–453)
PMV BLD AUTO: 9.6 FL (ref 8.1–13.5)
POTASSIUM SERPL-SCNC: 3.8 MMOL/L (ref 3.7–5.3)
RBC # BLD: 2.53 M/UL (ref 3.95–5.11)
WBC # BLD: 8.2 K/UL (ref 3.5–11.3)

## 2020-02-28 PROCEDURE — 85027 COMPLETE CBC AUTOMATED: CPT

## 2020-02-28 PROCEDURE — P9603 ONE-WAY ALLOW PRORATED MILES: HCPCS

## 2020-02-28 PROCEDURE — 84132 ASSAY OF SERUM POTASSIUM: CPT

## 2020-02-28 PROCEDURE — 36415 COLL VENOUS BLD VENIPUNCTURE: CPT

## 2020-02-28 PROCEDURE — 83735 ASSAY OF MAGNESIUM: CPT

## 2020-03-02 ENCOUNTER — HOSPITAL ENCOUNTER (OUTPATIENT)
Age: 85
Setting detail: SPECIMEN
Discharge: HOME | End: 2020-03-02
Payer: COMMERCIAL

## 2020-03-02 LAB
-: NORMAL
HCT VFR BLD CALC: 32 % (ref 36.3–47.1)
HEMOGLOBIN: 9.3 G/DL (ref 11.9–15.1)
REASON FOR REJECTION: NORMAL
ZZ NTE CLEAN UP: ORDERED TEST: NORMAL
ZZ NTE WITH NAME CLEAN UP: SPECIMEN SOURCE: NORMAL

## 2020-03-02 PROCEDURE — 85018 HEMOGLOBIN: CPT

## 2020-03-02 PROCEDURE — 85014 HEMATOCRIT: CPT

## 2020-03-02 PROCEDURE — 36415 COLL VENOUS BLD VENIPUNCTURE: CPT

## 2020-03-02 PROCEDURE — P9603 ONE-WAY ALLOW PRORATED MILES: HCPCS

## 2020-03-06 ENCOUNTER — HOSPITAL ENCOUNTER (OUTPATIENT)
Age: 85
Setting detail: SPECIMEN
Discharge: HOME | End: 2020-03-06
Payer: COMMERCIAL

## 2020-03-06 LAB
ANION GAP SERPL CALCULATED.3IONS-SCNC: 16 MMOL/L (ref 9–17)
BUN BLDV-MCNC: 16 MG/DL (ref 8–23)
BUN/CREAT BLD: ABNORMAL (ref 9–20)
CALCIUM SERPL-MCNC: 9.5 MG/DL (ref 8.6–10.4)
CHLORIDE BLD-SCNC: 101 MMOL/L (ref 98–107)
CO2: 23 MMOL/L (ref 20–31)
CREAT SERPL-MCNC: 0.92 MG/DL (ref 0.5–0.9)
GFR AFRICAN AMERICAN: >60 ML/MIN
GFR NON-AFRICAN AMERICAN: 58 ML/MIN
GFR SERPL CREATININE-BSD FRML MDRD: ABNORMAL ML/MIN/{1.73_M2}
GFR SERPL CREATININE-BSD FRML MDRD: ABNORMAL ML/MIN/{1.73_M2}
GLUCOSE BLD-MCNC: 140 MG/DL (ref 70–99)
MAGNESIUM: 1.6 MG/DL (ref 1.6–2.6)
POTASSIUM SERPL-SCNC: 3.5 MMOL/L (ref 3.7–5.3)
SODIUM BLD-SCNC: 140 MMOL/L (ref 135–144)

## 2020-03-06 PROCEDURE — 36415 COLL VENOUS BLD VENIPUNCTURE: CPT

## 2020-03-06 PROCEDURE — P9603 ONE-WAY ALLOW PRORATED MILES: HCPCS

## 2020-03-06 PROCEDURE — 80048 BASIC METABOLIC PNL TOTAL CA: CPT

## 2020-03-06 PROCEDURE — 83735 ASSAY OF MAGNESIUM: CPT

## 2020-03-10 ENCOUNTER — HOSPITAL ENCOUNTER (OUTPATIENT)
Age: 85
Setting detail: SPECIMEN
Discharge: HOME | End: 2020-03-10
Payer: COMMERCIAL

## 2020-03-10 LAB
ANION GAP SERPL CALCULATED.3IONS-SCNC: 13 MMOL/L (ref 9–17)
BUN BLDV-MCNC: 21 MG/DL (ref 8–23)
BUN/CREAT BLD: 23 (ref 9–20)
CALCIUM SERPL-MCNC: 9.2 MG/DL (ref 8.6–10.4)
CHLORIDE BLD-SCNC: 99 MMOL/L (ref 98–107)
CO2: 22 MMOL/L (ref 20–31)
CREAT SERPL-MCNC: 0.91 MG/DL (ref 0.5–0.9)
GFR AFRICAN AMERICAN: >60 ML/MIN
GFR NON-AFRICAN AMERICAN: 59 ML/MIN
GFR SERPL CREATININE-BSD FRML MDRD: ABNORMAL ML/MIN/{1.73_M2}
GFR SERPL CREATININE-BSD FRML MDRD: ABNORMAL ML/MIN/{1.73_M2}
GLUCOSE BLD-MCNC: 236 MG/DL (ref 70–99)
HCT VFR BLD CALC: 31.5 % (ref 36.3–47.1)
HEMOGLOBIN: 9.2 G/DL (ref 11.9–15.1)
MCH RBC QN AUTO: 29.1 PG (ref 25.2–33.5)
MCHC RBC AUTO-ENTMCNC: 29.2 G/DL (ref 28.4–34.8)
MCV RBC AUTO: 99.7 FL (ref 82.6–102.9)
NRBC AUTOMATED: 0 PER 100 WBC
PDW BLD-RTO: 16.1 % (ref 11.8–14.4)
PLATELET # BLD: 353 K/UL (ref 138–453)
PMV BLD AUTO: 9.5 FL (ref 8.1–13.5)
POTASSIUM SERPL-SCNC: 3.9 MMOL/L (ref 3.7–5.3)
RBC # BLD: 3.16 M/UL (ref 3.95–5.11)
SODIUM BLD-SCNC: 134 MMOL/L (ref 135–144)
WBC # BLD: 8.7 K/UL (ref 3.5–11.3)

## 2020-03-10 PROCEDURE — 80048 BASIC METABOLIC PNL TOTAL CA: CPT

## 2020-03-10 PROCEDURE — 85027 COMPLETE CBC AUTOMATED: CPT

## 2020-03-10 PROCEDURE — P9603 ONE-WAY ALLOW PRORATED MILES: HCPCS

## 2020-03-10 PROCEDURE — 36415 COLL VENOUS BLD VENIPUNCTURE: CPT

## 2020-03-16 ENCOUNTER — HOSPITAL ENCOUNTER (OUTPATIENT)
Age: 85
Setting detail: SPECIMEN
Discharge: HOME | End: 2020-03-16
Payer: COMMERCIAL

## 2020-03-16 LAB
ANION GAP SERPL CALCULATED.3IONS-SCNC: 15 MMOL/L (ref 9–17)
BNP INTERPRETATION: ABNORMAL
BUN BLDV-MCNC: 21 MG/DL (ref 8–23)
BUN/CREAT BLD: ABNORMAL (ref 9–20)
CALCIUM SERPL-MCNC: 8.8 MG/DL (ref 8.6–10.4)
CHLORIDE BLD-SCNC: 102 MMOL/L (ref 98–107)
CO2: 24 MMOL/L (ref 20–31)
CREAT SERPL-MCNC: 0.95 MG/DL (ref 0.5–0.9)
GFR AFRICAN AMERICAN: >60 ML/MIN
GFR NON-AFRICAN AMERICAN: 56 ML/MIN
GFR SERPL CREATININE-BSD FRML MDRD: ABNORMAL ML/MIN/{1.73_M2}
GFR SERPL CREATININE-BSD FRML MDRD: ABNORMAL ML/MIN/{1.73_M2}
GLUCOSE BLD-MCNC: 150 MG/DL (ref 70–99)
POTASSIUM SERPL-SCNC: 3.4 MMOL/L (ref 3.7–5.3)
PRO-BNP: 541 PG/ML
SODIUM BLD-SCNC: 141 MMOL/L (ref 135–144)

## 2020-03-16 PROCEDURE — 36415 COLL VENOUS BLD VENIPUNCTURE: CPT

## 2020-03-16 PROCEDURE — 83880 ASSAY OF NATRIURETIC PEPTIDE: CPT

## 2020-03-16 PROCEDURE — P9603 ONE-WAY ALLOW PRORATED MILES: HCPCS

## 2020-03-16 PROCEDURE — 80048 BASIC METABOLIC PNL TOTAL CA: CPT

## 2020-03-23 ENCOUNTER — HOSPITAL ENCOUNTER (OUTPATIENT)
Age: 85
Setting detail: SPECIMEN
Discharge: HOME | End: 2020-03-23
Payer: COMMERCIAL

## 2020-03-23 LAB
ANION GAP SERPL CALCULATED.3IONS-SCNC: 17 MMOL/L (ref 9–17)
BUN BLDV-MCNC: 19 MG/DL (ref 8–23)
BUN/CREAT BLD: ABNORMAL (ref 9–20)
CALCIUM SERPL-MCNC: 9 MG/DL (ref 8.6–10.4)
CHLORIDE BLD-SCNC: 99 MMOL/L (ref 98–107)
CO2: 21 MMOL/L (ref 20–31)
CREAT SERPL-MCNC: 1.05 MG/DL (ref 0.5–0.9)
GFR AFRICAN AMERICAN: >60 ML/MIN
GFR NON-AFRICAN AMERICAN: 50 ML/MIN
GFR SERPL CREATININE-BSD FRML MDRD: ABNORMAL ML/MIN/{1.73_M2}
GFR SERPL CREATININE-BSD FRML MDRD: ABNORMAL ML/MIN/{1.73_M2}
GLUCOSE BLD-MCNC: 139 MG/DL (ref 70–99)
POTASSIUM SERPL-SCNC: 3.7 MMOL/L (ref 3.7–5.3)
SODIUM BLD-SCNC: 137 MMOL/L (ref 135–144)

## 2020-03-23 PROCEDURE — 36415 COLL VENOUS BLD VENIPUNCTURE: CPT

## 2020-03-23 PROCEDURE — 80048 BASIC METABOLIC PNL TOTAL CA: CPT

## 2020-03-23 PROCEDURE — P9603 ONE-WAY ALLOW PRORATED MILES: HCPCS

## 2020-03-30 ENCOUNTER — HOSPITAL ENCOUNTER (OUTPATIENT)
Age: 85
Setting detail: SPECIMEN
Discharge: HOME | End: 2020-03-30
Payer: COMMERCIAL

## 2020-03-30 LAB
ANION GAP SERPL CALCULATED.3IONS-SCNC: 13 MMOL/L (ref 9–17)
BUN BLDV-MCNC: 16 MG/DL (ref 8–23)
BUN/CREAT BLD: ABNORMAL (ref 9–20)
CALCIUM SERPL-MCNC: 9.1 MG/DL (ref 8.6–10.4)
CHLORIDE BLD-SCNC: 103 MMOL/L (ref 98–107)
CO2: 22 MMOL/L (ref 20–31)
CREAT SERPL-MCNC: 1.16 MG/DL (ref 0.5–0.9)
GFR AFRICAN AMERICAN: 54 ML/MIN
GFR NON-AFRICAN AMERICAN: 45 ML/MIN
GFR SERPL CREATININE-BSD FRML MDRD: ABNORMAL ML/MIN/{1.73_M2}
GFR SERPL CREATININE-BSD FRML MDRD: ABNORMAL ML/MIN/{1.73_M2}
GLUCOSE BLD-MCNC: 150 MG/DL (ref 70–99)
POTASSIUM SERPL-SCNC: 3.8 MMOL/L (ref 3.7–5.3)
SODIUM BLD-SCNC: 138 MMOL/L (ref 135–144)

## 2020-03-30 PROCEDURE — 36415 COLL VENOUS BLD VENIPUNCTURE: CPT

## 2020-03-30 PROCEDURE — 80048 BASIC METABOLIC PNL TOTAL CA: CPT

## 2020-03-30 PROCEDURE — P9603 ONE-WAY ALLOW PRORATED MILES: HCPCS

## 2020-03-31 ENCOUNTER — HOSPITAL ENCOUNTER (OUTPATIENT)
Age: 85
Setting detail: SPECIMEN
Discharge: HOME | End: 2020-03-31
Payer: COMMERCIAL

## 2020-03-31 LAB
-: NORMAL
ABSOLUTE EOS #: 0.19 K/UL (ref 0–0.44)
ABSOLUTE IMMATURE GRANULOCYTE: 0.12 K/UL (ref 0–0.3)
ABSOLUTE LYMPH #: 1.11 K/UL (ref 1.1–3.7)
ABSOLUTE MONO #: 0.69 K/UL (ref 0.1–1.2)
BASOPHILS # BLD: 1 % (ref 0–2)
BASOPHILS ABSOLUTE: 0.04 K/UL (ref 0–0.2)
DIFFERENTIAL TYPE: ABNORMAL
EOSINOPHILS RELATIVE PERCENT: 2 % (ref 1–4)
HCT VFR BLD CALC: 35.8 % (ref 36.3–47.1)
HEMOGLOBIN: 10.8 G/DL (ref 11.9–15.1)
IMMATURE GRANULOCYTES: 2 %
LYMPHOCYTES # BLD: 13 % (ref 24–43)
MCH RBC QN AUTO: 29.5 PG (ref 25.2–33.5)
MCHC RBC AUTO-ENTMCNC: 30.2 G/DL (ref 28.4–34.8)
MCV RBC AUTO: 97.8 FL (ref 82.6–102.9)
MONOCYTES # BLD: 8 % (ref 3–12)
NRBC AUTOMATED: 0 PER 100 WBC
PDW BLD-RTO: 15.9 % (ref 11.8–14.4)
PLATELET # BLD: 464 K/UL (ref 138–453)
PLATELET ESTIMATE: ABNORMAL
PMV BLD AUTO: 9.9 FL (ref 8.1–13.5)
RBC # BLD: 3.66 M/UL (ref 3.95–5.11)
RBC # BLD: ABNORMAL 10*6/UL
REASON FOR REJECTION: NORMAL
SEG NEUTROPHILS: 74 % (ref 36–65)
SEGMENTED NEUTROPHILS ABSOLUTE COUNT: 6.12 K/UL (ref 1.5–8.1)
WBC # BLD: 8.3 K/UL (ref 3.5–11.3)
WBC # BLD: ABNORMAL 10*3/UL
ZZ NTE CLEAN UP: ORDERED TEST: NORMAL
ZZ NTE WITH NAME CLEAN UP: SPECIMEN SOURCE: NORMAL

## 2020-03-31 PROCEDURE — 36415 COLL VENOUS BLD VENIPUNCTURE: CPT

## 2020-03-31 PROCEDURE — 85025 COMPLETE CBC W/AUTO DIFF WBC: CPT

## 2020-03-31 PROCEDURE — 81003 URINALYSIS AUTO W/O SCOPE: CPT

## 2020-04-01 ENCOUNTER — HOSPITAL ENCOUNTER (OUTPATIENT)
Age: 85
Setting detail: SPECIMEN
Discharge: HOME | End: 2020-04-01
Payer: COMMERCIAL

## 2020-04-01 LAB
BILIRUBIN URINE: NEGATIVE
COLOR: YELLOW
COMMENT UA: NORMAL
GLUCOSE URINE: NEGATIVE
KETONES, URINE: NEGATIVE
LEUKOCYTE ESTERASE, URINE: NEGATIVE
NITRITE, URINE: NEGATIVE
PH UA: 6.5 (ref 5–8)
PROTEIN UA: NEGATIVE
SPECIFIC GRAVITY UA: 1.01 (ref 1–1.03)
TURBIDITY: CLEAR
URINE HGB: NEGATIVE
UROBILINOGEN, URINE: NORMAL

## 2020-04-01 PROCEDURE — 81003 URINALYSIS AUTO W/O SCOPE: CPT

## 2020-04-16 ENCOUNTER — TELEPHONE (OUTPATIENT)
Dept: FAMILY MEDICINE CLINIC | Age: 85
End: 2020-04-16

## 2020-04-17 ENCOUNTER — TELEPHONE (OUTPATIENT)
Dept: FAMILY MEDICINE CLINIC | Age: 85
End: 2020-04-17

## 2020-05-05 ENCOUNTER — PATIENT MESSAGE (OUTPATIENT)
Dept: FAMILY MEDICINE CLINIC | Age: 85
End: 2020-05-05

## 2020-05-05 RX ORDER — TAMSULOSIN HYDROCHLORIDE 0.4 MG/1
0.4 CAPSULE ORAL NIGHTLY
COMMUNITY
Start: 2020-02-20 | End: 2020-05-05 | Stop reason: SDUPTHER

## 2020-05-05 RX ORDER — POTASSIUM CHLORIDE 750 MG/1
1 TABLET, FILM COATED, EXTENDED RELEASE ORAL DAILY
COMMUNITY
Start: 2020-04-15 | End: 2020-05-05 | Stop reason: SDUPTHER

## 2020-05-05 RX ORDER — ATORVASTATIN CALCIUM 10 MG/1
10 TABLET, FILM COATED ORAL DAILY
Qty: 30 TABLET | Refills: 11 | Status: SHIPPED | OUTPATIENT
Start: 2020-05-05 | End: 2021-05-17 | Stop reason: SDUPTHER

## 2020-05-05 RX ORDER — FUROSEMIDE 20 MG/1
20 TABLET ORAL DAILY
Qty: 30 TABLET | Refills: 11 | Status: SHIPPED | OUTPATIENT
Start: 2020-05-05 | End: 2021-06-10

## 2020-05-05 RX ORDER — FUROSEMIDE 20 MG/1
1 TABLET ORAL DAILY
COMMUNITY
Start: 2020-04-15 | End: 2020-05-05 | Stop reason: SDUPTHER

## 2020-05-05 RX ORDER — DULOXETIN HYDROCHLORIDE 60 MG/1
CAPSULE, DELAYED RELEASE ORAL
Qty: 30 CAPSULE | Refills: 11 | Status: SHIPPED | OUTPATIENT
Start: 2020-05-05 | End: 2021-05-17 | Stop reason: SDUPTHER

## 2020-05-05 RX ORDER — POTASSIUM CHLORIDE 750 MG/1
10 TABLET, FILM COATED, EXTENDED RELEASE ORAL DAILY
Qty: 30 TABLET | Refills: 11 | Status: SHIPPED | OUTPATIENT
Start: 2020-05-05 | End: 2021-06-01

## 2020-05-05 RX ORDER — TAMSULOSIN HYDROCHLORIDE 0.4 MG/1
0.4 CAPSULE ORAL NIGHTLY
Qty: 30 CAPSULE | Refills: 11 | Status: SHIPPED | OUTPATIENT
Start: 2020-05-05 | End: 2021-01-05 | Stop reason: ALTCHOICE

## 2020-05-07 ENCOUNTER — TELEPHONE (OUTPATIENT)
Dept: FAMILY MEDICINE CLINIC | Age: 85
End: 2020-05-07

## 2020-05-07 NOTE — TELEPHONE ENCOUNTER
Gerson called from [facility] states Patient son states Patient has been having lower back pain, Frequency. Gerson from [facility] want to know if we can order ua/ culture and sensitity.

## 2020-05-12 ENCOUNTER — TELEPHONE (OUTPATIENT)
Dept: FAMILY MEDICINE CLINIC | Age: 85
End: 2020-05-12

## 2020-05-12 LAB
BILIRUBIN URINE: NORMAL
BLOOD, URINE: NORMAL
CLARITY: NORMAL
COLOR: NORMAL
GLUCOSE URINE: NORMAL
KETONES, URINE: NORMAL
LEUKOCYTE ESTERASE, URINE: NORMAL
NITRITE, URINE: NORMAL
PH UA: NORMAL
PROTEIN UA: NORMAL
SPECIFIC GRAVITY UA: NORMAL
UROBILINOGEN, URINE: NORMAL

## 2020-05-12 NOTE — TELEPHONE ENCOUNTER
Kelly العراقي called from [address] stating they are going to extended therapy for 2x a week for three weeks and then 1x a week for two weeks then discharge.

## 2020-05-14 ENCOUNTER — TELEPHONE (OUTPATIENT)
Dept: FAMILY MEDICINE CLINIC | Age: 85
End: 2020-05-14

## 2020-05-14 RX ORDER — GLIMEPIRIDE 2 MG/1
2 TABLET ORAL 2 TIMES DAILY
Qty: 180 TABLET | Refills: 3 | Status: SHIPPED | OUTPATIENT
Start: 2020-05-14 | End: 2021-06-29

## 2020-05-14 NOTE — TELEPHONE ENCOUNTER
Patients son did send in a message and the doctor treated her through that message. He did say they are checking into getting the patient a lift chair and power chair. I told him to check them out and then send us a message about what type and where they want to get it and we will take care of the paperwork .

## 2020-05-14 NOTE — TELEPHONE ENCOUNTER
Patient has head congestion, runny nose, cough with green sputum and nasal drainage. No SOB, fever, or body aches. I did ask that she have her son put in an E-visit to the doctor. He was right there with her and put into her mychart.

## 2020-05-15 RX ORDER — SULFAMETHOXAZOLE AND TRIMETHOPRIM 800; 160 MG/1; MG/1
1 TABLET ORAL 2 TIMES DAILY
Qty: 20 TABLET | Refills: 0 | Status: SHIPPED | OUTPATIENT
Start: 2020-05-15 | End: 2020-05-25

## 2020-05-18 ENCOUNTER — PATIENT MESSAGE (OUTPATIENT)
Dept: FAMILY MEDICINE CLINIC | Age: 85
End: 2020-05-18

## 2020-05-18 NOTE — TELEPHONE ENCOUNTER
From: Mary Tolentino  To: Walter Aquino MD  Sent: 5/18/2020 1:13 PM EDT  Subject: Test Results Question    Right after I sent you the message I checked her medication list on MY Chart and saw the doctor had sent in a prescription on Friday. I wasn't here on Saturday and mom just told me the pharmacy called on Saturday. :-)  She has 1 z-pac pill remaining. Should she just finish that and then start the bactrim or just go right to the bactrim? Ramiro Feliz      ----- Message -----   From:MA Clemencia Clayton   Sent:5/18/2020 12:11 PM EDT   To:Yajaira Noble   Subject:RE: Test Results Question    Ramiro Feliz,     It is positive, I apologize that no one called you back on Friday. Have her stop the zithromax and she called in bactrim which will cover her sinus infection as well as the UTI. Again, I am sorry we didn't get back to you Friday! Aracely Callaway       ----- Message -----   From:Yajaira Seals   Sent:5/18/2020 12:00 PM EDT   Derek Jacob MD   Subject:Test Results Question    I see the recent Urine test has been posted, but it doesn't show any results. Was the test positive or negative for a UTI?     Casilda Moritz

## 2020-05-20 ENCOUNTER — TELEPHONE (OUTPATIENT)
Dept: FAMILY MEDICINE CLINIC | Age: 85
End: 2020-05-20

## 2020-05-28 ENCOUNTER — TELEPHONE (OUTPATIENT)
Dept: FAMILY MEDICINE CLINIC | Age: 85
End: 2020-05-28

## 2020-05-28 NOTE — TELEPHONE ENCOUNTER
Creed from [address] [place] [address] called to let Dr. Nitin Olguin know that this patient fell. He said she claims to not be in any pain, but did hit her head when she fell. He is sending a nurse to evaluate.

## 2020-05-29 RX ORDER — ACETAMINOPHEN AND CODEINE PHOSPHATE 300; 30 MG/1; MG/1
1 TABLET ORAL EVERY 4 HOURS PRN
Qty: 30 TABLET | Refills: 0 | Status: SHIPPED | OUTPATIENT
Start: 2020-05-29 | End: 2020-06-03

## 2020-05-29 NOTE — TELEPHONE ENCOUNTER
I called the patients son to check on her since she has fallen twice in the last week but did not want treated. He said that he is staying with the patient and does know about the falls and didn't feel she needed to be seen but she is C/O some pain on her left side and is wondering if something for pain could be called in.

## 2020-06-02 ENCOUNTER — PATIENT MESSAGE (OUTPATIENT)
Dept: FAMILY MEDICINE CLINIC | Age: 85
End: 2020-06-02

## 2020-06-02 NOTE — TELEPHONE ENCOUNTER
From: Kyung Swan  To: Carlton Bernardo MD  Sent: 6/2/2020 2:03 PM EDT  Subject: Non-Urgent Medical Question    As requested, here are Yajaira's blood sugar readings for the last two weeks: 143, 102, 125, 130, 136, 127, 125, 134, 129, 80, 124, 151, 139, 158. We've been looking at possibly moving her into an Assisted Living facility. One of the requirements at most places is that she get a COVID test and have the doctor send the results to them. Is this something that we could have done at the doctor's office or have the doctor send an order for her to get it done somewhere else?     Chinedu Burgess

## 2020-06-30 RX ORDER — GLUCOSAMINE HCL/CHONDROITIN SU 500-400 MG
CAPSULE ORAL
Qty: 100 STRIP | Refills: 0 | Status: SHIPPED | OUTPATIENT
Start: 2020-06-30

## 2020-06-30 RX ORDER — LANCETS 30 GAUGE
EACH MISCELLANEOUS
Qty: 100 EACH | Refills: 0 | Status: SHIPPED | OUTPATIENT
Start: 2020-06-30

## 2020-07-27 ENCOUNTER — TELEPHONE (OUTPATIENT)
Dept: FAMILY MEDICINE CLINIC | Age: 85
End: 2020-07-27

## 2020-07-27 NOTE — TELEPHONE ENCOUNTER
Patients daughter is here for her appointment and asked that an order for the patients CPAP be sent to [address] [place] [address] but we have not seen her since 10/2019. I did give her the number for them and asked that she have them send us over an order.

## 2020-08-04 ENCOUNTER — OFFICE VISIT (OUTPATIENT)
Dept: UROLOGY | Age: 85
End: 2020-08-04
Payer: COMMERCIAL

## 2020-08-04 VITALS — TEMPERATURE: 98.6 F

## 2020-08-04 PROCEDURE — 99213 OFFICE O/P EST LOW 20 MIN: CPT | Performed by: NURSE PRACTITIONER

## 2020-08-04 PROCEDURE — 95971 ALYS SMPL SP/PN NPGT W/PRGRM: CPT | Performed by: NURSE PRACTITIONER

## 2020-08-04 ASSESSMENT — ENCOUNTER SYMPTOMS
COUGH: 0
SHORTNESS OF BREATH: 0
CONSTIPATION: 0
WHEEZING: 0
DIARRHEA: 0
VOMITING: 0
EYE PAIN: 0
EYE REDNESS: 0
ABDOMINAL PAIN: 0
BACK PAIN: 0
NAUSEA: 0

## 2020-08-04 NOTE — PROGRESS NOTES
[facility]  [facility] - [facility] [id number]  Dept: [address] [facility] Urology Office Note - Established    Patient:  Kevin Werner  YOB: 1935  Date: 8/4/2020    The patient is a 82 y.o. female whopresents today for evaluation of the following problems:   Chief Complaint   Patient presents with    Medication Check       HPI  Here for interstim check- her son manages her interstim and think he may be setting it too high. . She is on Flomax ordered by PCP, and is on Oxybutynin. Feels she empties well, denies frequency, has no daytime urgency but often has urge incontinence in the morning. 1-2 briefs per day. Feels she has a huge improvement since the interstim. Summary of old records: N/A    Additional History:   Interstim programming:     The programming head was placed over the implanted neurostimulator. Previous programed electrode selectionsettings: Program 2  Amps 4.4    Final electrode setting: Program 3 amps 1.8     Impedence all under 4000 ohms verified:    Battery life checked:    Sensation in pelvic area. yes  Procedures Today: N/A    Urinalysis today:  No results found for this visit on 08/04/20.     Imaging Reviewed during this Office Visit:   (results were independently reviewed by physician and radiology report verified)    AUA Symptom Score (8/4/2020):  INCOMPLETE EMPTYING: How often have you had the sensation of not emptying your bladder?: Not at all  FREQUENCY: How often do you have to urinate less than every two hours?: Not at all  INTERMITTENCY: How often have you found you stopped and started again several times when you urinated?: Not at all  URGENCY: How often have you found it difficult to postpone urination?: Not at all  WEAK STREAM: How often have you had a weak urinary stream?: Not at all  STRAINING: How often have you had to strain to start  urination?: Not at all  NOCTURIA: How many times did you typically get up at night to uriniate?: 1 Time  TOTAL I-PSS SCORE[de-identified] 1  How would you feel if you were to spend the rest of your life with your urinary condition?: Pleased    Last BUN and creatinine:  Lab Results   Component Value Date    BUN 16 03/30/2020     Lab Results   Component Value Date    CREATININE 1.16 (H) 03/30/2020       Additional Lab/Culture results:     PAST MEDICAL, FAMILY AND SOCIAL HISTORY UPDATE:  Past Medical History:   Diagnosis Date    Diverticulosis     Hyperlipidemia     Hypertension     Hypokalemia     Iron deficiency anemia     Menopause     s/p hysterectomy    Osteoarthritis     bilat hands, hips, knees    S/P partial colectomy 2/2002    [facility]     Thrombocytosis ([facility]     Type II or unspecified type diabetes mellitus without mention of complication, not stated as uncontrolled 02/2007    Unspecified sleep apnea 08/2004    uses bipap    UTI (lower urinary tract infection)     re-curring     Past Surgical History:   Procedure Laterality Date    APPENDECTOMY  1993    CARDIOVASCULAR STRESS TEST  03/12    CARPAL TUNNEL RELEASE  1980's    bilat    CHOLECYSTECTOMY  1969    COLON SURGERY  1993    partial colectomy, d/t stricture    COLONOSCOPY  2001    CYSTOCELE REPAIR  1968    HERNIA REPAIR  09/27/2016    ventral/per Dr. Elyssa Cummings    partial, d/t uterine prolapse    [facility]    lumbar fusion with cement    TONSILLECTOMY  1947    UPPER GASTROINTESTINAL ENDOSCOPY  03/08/2018    Dr. Larry Courtney, [facility]NL     Family History   Problem Relation Age of Onset    Heart Attack Mother     High Blood Pressure Mother     Heart Attack Father     High Blood Pressure Father     Colon Cancer Sister     Diabetes Maternal Aunt     Cancer Other         mcousin, colon, breast ca     Outpatient Medications Marked as Taking for the 8/4/20 encounter (Office Visit) with MARIBETH Dominique CNP   Medication Sig Dispense Refill   [facility] oxybutynin (DITROPAN-XL) 5 MG extended release tablet Take 1 tablet by mouth daily 90 tablet 3    Lancets MISC Indications: NO FURTHER REFILLS UNTIL SEEN IN THE OFFICE. Please dispense according to patients insurance/device. Patient testing weekly, and  each 0    blood glucose monitor strips Indications: NO FURTHER REFILLS UNTIL SEEN IN THE OFFICE. Please supply patient with test strip that insurance covers, tests weekly and PRN. 100 strip 0    dilTIAZem (CARTIA XT) 240 MG extended release capsule Take 1 capsule by mouth daily 90 capsule 3    metFORMIN (GLUCOPHAGE) 500 MG tablet take 1 tablet by mouth every morning 90 tablet 0    glimepiride (AMARYL) 2 MG tablet Take 1 tablet by mouth 2 times daily 180 tablet 3    atorvastatin (LIPITOR) 10 MG tablet Take 1 tablet by mouth daily 30 tablet 11    furosemide (LASIX) 20 MG tablet Take 1 tablet by mouth daily 30 tablet 11    potassium chloride (KLOR-CON) 10 MEQ extended release tablet Take 1 tablet by mouth daily 30 tablet 11    tamsulosin (FLOMAX) 0.4 MG capsule Take 1 capsule by mouth nightly 30 capsule 11    DULoxetine (CYMBALTA) 60 MG extended release capsule take 1 capsule by mouth once daily 30 capsule 11    budesonide-formoterol (SYMBICORT) 80-4.5 MCG/ACT AERO Inhale 2 puffs into the lungs 2 times daily 1 Inhaler 11    levothyroxine (SYNTHROID) 50 MCG tablet take 1 tablet by mouth once daily 90 tablet 3    polyethylene glycol (GLYCOLAX) packet Take 17 g by mouth daily  0    latanoprost (XALATAN) 0.005 % ophthalmic solution       cetirizine (ZYRTEC) 10 MG tablet Take 10 mg by mouth daily as needed.  Multiple Vitamin (THERA) TABS Take 1 tablet by mouth daily.  aspirin 81 MG EC tablet Take 81 mg by mouth daily. (All medications reviewed and updated by provider sincelast office visit or hospitalization)   Pcn [penicillins]; Demerol; Effexor [venlafaxine];  Lisinopril; and Macrobid [nitrofurantoin monohydrate Dr Krystle Harrington. Tamsulosin discuss with Dr John Roldan. Elevate legs when sitting    Coffee, tea, pop and alcohol are bladder irritants. Avoid constipation     1 month follow up. Call with questions or concerns sooner  No follow-ups on file. Prescriptions Ordered:  No orders of the defined types were placed in this encounter. Orders Placed:  No orders of the defined types were placed in this encounter. MARIBETH Hannah CNP    reviewed and Agree with the ROS entered by the MA.

## 2020-08-04 NOTE — LETTER
[address]  Dept: [phone]  Dept Fax: [phone]        8/5/20    Patient: Jair Pacheco  YOB: 1935    Dear Jan Gandara MD,    I had the pleasure of seeing one of your patients, Breanna Tamez today in the office today. Below are the relevant portions of my assessment and plan of care. IMPRESSION:  1. Urgency of urination    2. Urge incontinence    3. Urinary frequency    4. Nocturia        PLAN:  void every 2 hours during the day     Lasix (Furosemide will increase urination, and interstim will not prevent that)    With lower leg swelling may have more nighttime urination, try to elevate legs when sitting    Oxybutynin continue     Take Lasix as ordered- discuss with Dr Sean Terrell. Tamsulosin discuss with Dr Nilam Luke. Elevate legs when sitting    Coffee, tea, pop and alcohol are bladder irritants. Avoid constipation     1 month follow up. Call with questions or concerns sooner  No follow-ups on file. Prescriptions Ordered:  No orders of the defined types were placed in this encounter. Orders Placed:  No orders of the defined types were placed in this encounter. Thank you for allowing me to participate in the care of this patient. I will keep you updated on this patient's follow up and I look forward to serving you and your patients again in the future.     MARIBETH Michael - CNP

## 2020-08-04 NOTE — PATIENT INSTRUCTIONS
Explained to pt and son how interstim program and settings work. With high amplitudes, the battery life will be depleted sooner. At this point utilizing other programs will be more effective. Recommend not to change settings more than every 3 weeks unless a setting is causing pain- then it can be changed. Call with any questions when making settings for phone support if needed. void every 2 hours during the day     Lasix (Furosemide will increase urination, and interstim will not prevent that)    With lower leg swelling may have more nighttime urination, try to elevate legs when sitting    Oxybutynin continue     Take Lasix as ordered- discuss with Dr iDanna Young. Tamsulosin discuss with Dr Juventino Mina. Elevate legs when sitting    Coffee, tea, pop and alcohol are bladder irritants. Avoid constipation     1 month follow up.

## 2020-08-04 NOTE — TELEPHONE ENCOUNTER
Patient nor her daughter have called back or scheduled an appointment for the patient. Will wait to hear from them to get her scheduled. Cannot send order for the supplies without an office note.

## 2020-09-02 ENCOUNTER — OFFICE VISIT (OUTPATIENT)
Dept: UROLOGY | Age: 85
End: 2020-09-02
Payer: COMMERCIAL

## 2020-09-02 VITALS — TEMPERATURE: 98.5 F

## 2020-09-02 PROCEDURE — 99213 OFFICE O/P EST LOW 20 MIN: CPT | Performed by: NURSE PRACTITIONER

## 2020-09-02 PROCEDURE — 95970 ALYS NPGT W/O PRGRMG: CPT | Performed by: NURSE PRACTITIONER

## 2020-09-02 ASSESSMENT — ENCOUNTER SYMPTOMS
WHEEZING: 0
COLOR CHANGE: 0
ABDOMINAL PAIN: 0
VOMITING: 0
NAUSEA: 0
COUGH: 0
BACK PAIN: 0
SHORTNESS OF BREATH: 0
EYE REDNESS: 0
EYE PAIN: 0

## 2020-09-02 NOTE — PROGRESS NOTES
[address] [address]  Dept:  [facility] Urology Office Note - Established    Patient:  Alexandrea Camp  YOB: 1935  Date: 9/2/2020    The patient is a 82 y.o. female whopresents today for evaluation of the following problems:   Chief Complaint   Patient presents with    Incontinence     interstim check 1 mo helps. wakes up wet but not soaked        HPI  Here for follow up on interstim. She is using one pull up and and an occasional pad. Voids every 2-3 hours, has some leak on the way to the BR. Here bowels are doing well, she is not constipated and uses prn med if her stools get harder she uses OTC preparations. She continues Oxybutynin 5 mg daily. Flomax has been ordered by her PCP. She spends much of her day sitting in a chair. Summary of old records: N/A    Additional History: N/A    Procedures Today: Interstim interogation. Program 3 Amp 1.8     Battery okay     Urinalysis today:  No results found for this visit on 09/02/20.     Imaging Reviewed during this Office Visit:  (results were independently reviewed by physician and radiology report verified)    AUA Symptom Score (9/2/2020):  INCOMPLETE EMPTYING: How often have you had the sensation of not emptying your bladder?: Not at all  FREQUENCY: How often do you have to urinate less than every two hours?: Not at all  INTERMITTENCY: How often have you found you stopped and started again several times when you urinated?: Not at all  URGENCY: How often have you found it difficult to postpone urination?: Not at all  WEAK STREAM: How often have you had a weak urinary stream?: Less than 1 to 5 times  STRAINING: How often have you had to strain to start  urination?: Not at all  NOCTURIA: How many times did you typically get up at night to uriniate?: NONE  TOTAL I-PSS SCORE[de-identified] 1  How would you feel if you were to spend the rest of your life with your urinary condition?: Mixe    Last BUN and creatinine:  Lab Results   Component Value Date    BUN 16 03/30/2020     Lab Results   Component Value Date    CREATININE 1.16 (H) 03/30/2020       Additional Lab/Culture results:     PAST MEDICAL, FAMILY AND SOCIAL HISTORY UPDATE:  Past Medical History:   Diagnosis Date    Diverticulosis     Hyperlipidemia     Hypertension     Hypokalemia     Iron deficiency anemia     Menopause     s/p hysterectomy    Osteoarthritis     bilat hands, hips, knees    S/P partial colectomy 2/2002    Stroke ([address]     Thrombocytosis ([address]     Type II or unspecified type diabetes mellitus without mention of complication, not stated as uncontrolled 02/2007    Unspecified sleep apnea 08/2004    uses bipap    UTI (lower urinary tract infection)     re-curring     Past Surgical History:   Procedure Laterality Date    APPENDECTOMY  1993    CARDIOVASCULAR STRESS TEST  03/12    CARPAL TUNNEL RELEASE  1980's    bilat    CHOLECYSTECTOMY  1969    COLON SURGERY  1993    partial colectomy, d/t stricture    COLONOSCOPY  2001    CYSTOCELE REPAIR  1968    HERNIA REPAIR  09/27/2016    ventral/per Dr. Wilber Archibald    partial, d/t uterine prolapse    [facility]    lumbar fusion with cement    TONSILLECTOMY  1947    UPPER GASTROINTESTINAL ENDOSCOPY  03/08/2018    Dr. Gisela Erickson, WNL     Family History   Problem Relation Age of Onset    Heart Attack Mother     High Blood Pressure Mother     Heart Attack Father     High Blood Pressure Father     Colon Cancer Sister     Diabetes Maternal Aunt     Cancer Other         mcousin, colon, breast ca     Outpatient Medications Marked as Taking for the 9/2/20 encounter (Office Visit) with MARIBETH Coppola CNP   Medication Sig Dispense Refill    metFORMIN (GLUCOPHAGE) 500 MG tablet take 1 tablet by mouth every morning 30 tablet 0    oxybutynin (DITROPAN-XL) 5 MG extended release tablet Take 1 tablet by mouth daily 90 tablet 3    Lancets MISC Indications: NO FURTHER REFILLS UNTIL SEEN IN THE OFFICE. Please dispense according to patients insurance/device. Patient testing weekly, and  each 0    blood glucose monitor strips Indications: NO FURTHER REFILLS UNTIL SEEN IN THE OFFICE. Please supply patient with test strip that insurance covers, tests weekly and PRN. 100 strip 0    dilTIAZem (CARTIA XT) 240 MG extended release capsule Take 1 capsule by mouth daily 90 capsule 3    glimepiride (AMARYL) 2 MG tablet Take 1 tablet by mouth 2 times daily 180 tablet 3    atorvastatin (LIPITOR) 10 MG tablet Take 1 tablet by mouth daily 30 tablet 11    furosemide (LASIX) 20 MG tablet Take 1 tablet by mouth daily 30 tablet 11    potassium chloride (KLOR-CON) 10 MEQ extended release tablet Take 1 tablet by mouth daily 30 tablet 11    tamsulosin (FLOMAX) 0.4 MG capsule Take 1 capsule by mouth nightly 30 capsule 11    DULoxetine (CYMBALTA) 60 MG extended release capsule take 1 capsule by mouth once daily 30 capsule 11    budesonide-formoterol (SYMBICORT) 80-4.5 MCG/ACT AERO Inhale 2 puffs into the lungs 2 times daily 1 Inhaler 11    levothyroxine (SYNTHROID) 50 MCG tablet take 1 tablet by mouth once daily 90 tablet 3    polyethylene glycol (GLYCOLAX) packet Take 17 g by mouth daily  0    latanoprost (XALATAN) 0.005 % ophthalmic solution       cetirizine (ZYRTEC) 10 MG tablet Take 10 mg by mouth daily as needed.  Multiple Vitamin (THERA) TABS Take 1 tablet by mouth daily.  aspirin 81 MG EC tablet Take 81 mg by mouth daily. (All medications reviewed and updated by provider sincelast office visit or hospitalization)   Pcn [penicillins]; Demerol; Effexor [venlafaxine];  Lisinopril; and Macrobid [nitrofurantoin monohydrate macrocrystals]  Social History     Tobacco Use   Smoking Status Never Smoker   Smokeless Tobacco Never Used      (If patient a smoker, smoking cessation counseling offered)     Social History     Substance and Sexual Activity   Alcohol Use No       REVIEW OF SYSTEMS:  Review of Systems      Physical Exam:      Vitals:    09/02/20 1427   Temp: 98.5 °F (36.9 °C)     There is no height or weight on file to calculate BMI. Patient is a 82 y.o. female in noacute distress and alert and oriented to person, place and time. Physical Exam  Constitutional: Patient in no acute distress. Neuro: Alert andoriented to person, place and time. Psych: Mood normal, affect normal  Skin: No rash noted  HEENT: Head: Normocephalic and atraumatic  Conjunctivae and EOM are normal. Pupils are equal, round  Nose: Normal  Right External Ear: Normal; Left External Ear: Normal  Mouth: Mucosa Moist  Neck: Supple  Lungs: Respiratory effort is normal  Cardiovascular:   Abdomen: Soft, non-tender, non-distended  Bladder non-tender and not distended. Musculoskeletal: WC      and Plan      1. Urge incontinence    2. Urgency of urination    3. Nocturia           Plan:    interstim settings checked. Timed voiding every 1.5 to 2 hours during the day. Be closer to the BR after Lasix    Elevate legs when sitting    Decrease fluids 2 hours before bed. 6 month interstim check and as needed. Call with questions or concerns   No follow-ups on file. Prescriptions Ordered:  No orders of the defined types were placed in this encounter. Orders Placed:  No orders of the defined types were placed in this encounter. MARIBETH Coppola CNP    reviewed and Agree with the ROS entered by the MA.

## 2020-09-23 LAB
ALBUMIN SERPL-MCNC: 4.2 G/DL
ALP BLD-CCNC: 67 U/L
ALT SERPL-CCNC: 12 U/L
ANION GAP SERPL CALCULATED.3IONS-SCNC: 16 MMOL/L
AST SERPL-CCNC: 16 U/L
AVERAGE GLUCOSE: 131
BASOPHILS ABSOLUTE: ABNORMAL
BASOPHILS RELATIVE PERCENT: ABNORMAL
BILIRUB SERPL-MCNC: 0.3 MG/DL (ref 0.1–1.4)
BUN BLDV-MCNC: 29 MG/DL
CALCIUM SERPL-MCNC: 10 MG/DL
CHLORIDE BLD-SCNC: 102 MMOL/L
CHOLESTEROL, TOTAL: 135 MG/DL
CHOLESTEROL/HDL RATIO: 3.1
CO2: 22 MMOL/L
CREAT SERPL-MCNC: 1.38 MG/DL
EOSINOPHILS ABSOLUTE: ABNORMAL
EOSINOPHILS RELATIVE PERCENT: ABNORMAL
GFR CALCULATED: 37
GLUCOSE BLD-MCNC: 133 MG/DL
HBA1C MFR BLD: 6.2 %
HCT VFR BLD CALC: 35.7 % (ref 36–46)
HDLC SERPL-MCNC: 43 MG/DL (ref 35–70)
HEMOGLOBIN: 11.7 G/DL (ref 12–16)
LDL CHOLESTEROL CALCULATED: 63 MG/DL (ref 0–160)
LYMPHOCYTES ABSOLUTE: ABNORMAL
LYMPHOCYTES RELATIVE PERCENT: ABNORMAL
MCH RBC QN AUTO: ABNORMAL PG
MCHC RBC AUTO-ENTMCNC: ABNORMAL G/DL
MCV RBC AUTO: ABNORMAL FL
MONOCYTES ABSOLUTE: ABNORMAL
MONOCYTES RELATIVE PERCENT: ABNORMAL
NEUTROPHILS ABSOLUTE: ABNORMAL
NEUTROPHILS RELATIVE PERCENT: ABNORMAL
NONHDLC SERPL-MCNC: NORMAL MG/DL
PLATELET # BLD: 318 K/ΜL
PMV BLD AUTO: ABNORMAL FL
POTASSIUM SERPL-SCNC: 4 MMOL/L
RBC # BLD: ABNORMAL 10*6/UL
SODIUM BLD-SCNC: 140 MMOL/L
T4 FREE: 1.08
TOTAL PROTEIN: 7.5
TRIGL SERPL-MCNC: 145 MG/DL
TSH SERPL DL<=0.05 MIU/L-ACNC: 2.6 UIU/ML
VITAMIN B-12: 196
VLDLC SERPL CALC-MCNC: 29 MG/DL
WBC # BLD: 6.8 10^3/ML

## 2020-10-01 ENCOUNTER — OFFICE VISIT (OUTPATIENT)
Dept: FAMILY MEDICINE CLINIC | Age: 85
End: 2020-10-01
Payer: COMMERCIAL

## 2020-10-01 VITALS
RESPIRATION RATE: 16 BRPM | BODY MASS INDEX: 38.62 KG/M2 | DIASTOLIC BLOOD PRESSURE: 62 MMHG | HEART RATE: 72 BPM | TEMPERATURE: 98.9 F | OXYGEN SATURATION: 95 % | WEIGHT: 218 LBS | SYSTOLIC BLOOD PRESSURE: 112 MMHG

## 2020-10-01 PROCEDURE — 90694 VACC AIIV4 NO PRSRV 0.5ML IM: CPT | Performed by: FAMILY MEDICINE

## 2020-10-01 PROCEDURE — 99214 OFFICE O/P EST MOD 30 MIN: CPT | Performed by: FAMILY MEDICINE

## 2020-10-01 PROCEDURE — 96372 THER/PROPH/DIAG INJ SC/IM: CPT | Performed by: FAMILY MEDICINE

## 2020-10-01 PROCEDURE — G0008 ADMIN INFLUENZA VIRUS VAC: HCPCS | Performed by: FAMILY MEDICINE

## 2020-10-01 RX ORDER — CYANOCOBALAMIN 1000 UG/ML
1000 INJECTION INTRAMUSCULAR; SUBCUTANEOUS ONCE
Status: COMPLETED | OUTPATIENT
Start: 2020-10-01 | End: 2020-10-01

## 2020-10-01 RX ORDER — LORAZEPAM 0.5 MG/1
0.5 TABLET ORAL 2 TIMES DAILY PRN
Qty: 60 TABLET | Refills: 0 | Status: SHIPPED | OUTPATIENT
Start: 2020-10-01 | End: 2021-07-08

## 2020-10-01 RX ADMIN — CYANOCOBALAMIN 1000 MCG: 1000 INJECTION INTRAMUSCULAR; SUBCUTANEOUS at 15:34

## 2020-10-01 ASSESSMENT — ENCOUNTER SYMPTOMS
BACK PAIN: 0
COUGH: 0
SHORTNESS OF BREATH: 0
RHINORRHEA: 0
ABDOMINAL PAIN: 0
NAUSEA: 0
DIARRHEA: 0
CONSTIPATION: 0
CHEST TIGHTNESS: 0
VOMITING: 0
ABDOMINAL DISTENTION: 0
SORE THROAT: 0

## 2020-10-01 NOTE — PROGRESS NOTES
HYPERTENSION visit     BP Readings from Last 3 Encounters:   11/11/19 (!) 159/83   10/18/19 138/70   07/08/19 130/70       LDL Calculated (mg/dL)   Date Value   09/23/2020 63     LDL Cholesterol (mg/dL)   Date Value   07/08/2014 54     HDL (mg/dL)   Date Value   09/23/2020 43     BUN (mg/dL)   Date Value   09/23/2020 29     CREATININE (no units)   Date Value   09/23/2020 1.38     Glucose (mg/dL)   Date Value   09/23/2020 133              Have you changed or started any medications since your last visit including any over-the-counter medicines, vitamins, or herbal medicines? no   Have you stopped taking any of your medications? Is so, why? -  no  Are you having any side effects from any of your medications? - no  How often do you miss doses of your medication? rare      Have you seen any other physician or provider since your last visit? yes - urlogy   Have you had any other diagnostic tests since your last visit?  no   Have you been seen in the emergency room and/or had an admission in a hospital since we last saw you?  yes - SBO, pneumonia   Have you had your routine dental cleaning in the past 6 months?  yes -      Do you have an active MyChart account? If no, what is the barrier?   Yes    Patient Care Team:  Evelyn Denis MD as PCP - General (Family Medicine)  Evelyn Denis MD as PCP - [facility]  Katja Starr MD as Consulting Physician (Gastroenterology)  Jo Foster DPM as Consulting Physician (Podiatry)  Loyda Kerr OD as Consulting Physician (Optometry)  Lico Gomez MD as Consulting Physician (Urology)  Radha Paris MD as Surgeon (General Surgery)    Medical History Review  Past Medical, Family, and Social History reviewed and does contribute to the patient presenting condition    Health Maintenance   Topic Date Due    Shingles Vaccine (1 of 2) 06/18/1987    Annual Wellness Visit (AWV)  05/29/2019    Flu vaccine (1) 09/01/2020    DTaP/Tdap/Td vaccine (1 - Tdap) 11/29/2021 (Originally 6/18/1954)    Statin Therapy  05/05/2021    Lipid screen  09/23/2021    TSH testing  09/23/2021    Potassium monitoring  09/23/2021    Creatinine monitoring  09/23/2021    Pneumococcal 65+ years Vaccine  Completed    DEXA (modify frequency per FRAX score)  Addressed    Hepatitis A vaccine  Aged Out    Hib vaccine  Aged Out    Meningococcal (ACWY) vaccine  Aged Out     Patient/Caregiver verbalize understanding of medications.   Yes

## 2020-10-01 NOTE — PROGRESS NOTES
glimepiride (AMARYL) 2 MG tablet Take 1 tablet by mouth 2 times daily 180 tablet 3    atorvastatin (LIPITOR) 10 MG tablet Take 1 tablet by mouth daily 30 tablet 11    furosemide (LASIX) 20 MG tablet Take 1 tablet by mouth daily 30 tablet 11    potassium chloride (KLOR-CON) 10 MEQ extended release tablet Take 1 tablet by mouth daily 30 tablet 11    tamsulosin (FLOMAX) 0.4 MG capsule Take 1 capsule by mouth nightly 30 capsule 11    DULoxetine (CYMBALTA) 60 MG extended release capsule take 1 capsule by mouth once daily 30 capsule 11    polyethylene glycol (GLYCOLAX) packet Take 17 g by mouth daily  0    cetirizine (ZYRTEC) 10 MG tablet Take 10 mg by mouth daily as needed.  Multiple Vitamin (THERA) TABS Take 1 tablet by mouth daily.  aspirin 81 MG EC tablet Take 81 mg by mouth daily.  Lancets MISC Indications: NO FURTHER REFILLS UNTIL SEEN IN THE OFFICE. Please dispense according to patients insurance/device. Patient testing weekly, and  each 0    budesonide-formoterol (SYMBICORT) 80-4.5 MCG/ACT AERO Inhale 2 puffs into the lungs 2 times daily (Patient not taking: Reported on 10/1/2020) 1 Inhaler 11     No current facility-administered medications on file prior to visit. Subjective:     Review of Systems   Constitutional: Positive for fatigue. Negative for appetite change and fever. HENT: Negative for congestion, ear pain, rhinorrhea and sore throat. Respiratory: Negative for cough, chest tightness and shortness of breath. Cardiovascular: Negative for chest pain and palpitations. Gastrointestinal: Negative for abdominal distention, abdominal pain, constipation, diarrhea, nausea and vomiting. Occ heartburn   Genitourinary: Positive for difficulty urinating (incontinence - stable). Negative for dysuria. Musculoskeletal: Positive for gait problem (walks with a walker). Negative for arthralgias, back pain (walks with a cane) and myalgias.    Skin: Negative for rash.   Neurological: Positive for dizziness and weakness (left LE). Negative for light-headedness and headaches. Hematological: Negative for adenopathy. Psychiatric/Behavioral: Positive for dysphoric mood. Negative for behavioral problems and sleep disturbance. Decreased concentration: stable. The patient is nervous/anxious (stable). Objective:     Physical Exam  Vitals signs reviewed. Constitutional:       General: She is not in acute distress. Appearance: She is well-developed. HENT:      Head: Normocephalic and atraumatic. Right Ear: External ear normal.      Left Ear: External ear normal.      Nose: Nose normal.      Mouth/Throat:      Pharynx: No oropharyngeal exudate. Eyes:      Conjunctiva/sclera: Conjunctivae normal.      Pupils: Pupils are equal, round, and reactive to light. Neck:      Musculoskeletal: Normal range of motion. Cardiovascular:      Rate and Rhythm: Normal rate and regular rhythm. Heart sounds: Murmur present. Systolic murmur present with a grade of 4/6. Pulmonary:      Effort: Pulmonary effort is normal. No respiratory distress. Breath sounds: Normal breath sounds. No wheezing. Chest:      Chest wall: No tenderness. Abdominal:      General: Bowel sounds are normal. There is no distension. Palpations: Abdomen is soft. There is no mass. Tenderness: There is no abdominal tenderness. Musculoskeletal: Normal range of motion. General: No tenderness. Lymphadenopathy:      Cervical: No cervical adenopathy. Skin:     Findings: No rash. Neurological:      Mental Status: She is alert and oriented to person, place, and time. Deep Tendon Reflexes: Reflexes are normal and symmetric. Psychiatric:         Behavior: Behavior normal.     Labs reviewed with pt today. Assessment:      Diagnosis Orders   1. Essential hypertension  Basic Metabolic Panel    CBC   2. Pure hypercholesterolemia     3.  Type 2 diabetes mellitus with complication, without long-term current use of insulin (HCC)  Basic Metabolic Panel    Hemoglobin A1C   4. Hypothyroidism, unspecified type     5. Stage 3b chronic kidney disease  Basic Metabolic Panel   6. Iron deficiency anemia, unspecified iron deficiency anemia type  CBC   7. B12 deficiency  Vitamin B12    cyanocobalamin injection 1,000 mcg   8. Gastroesophageal reflux disease without esophagitis     9. Depression, unspecified depression type     10. Generalized anxiety/depression. 11. Urge incontinence     12. History of CVA (cerebrovascular accident)     15. Mild intermittent asthma without complication     14. Need for influenza vaccination  INFLUENZA, QUADV, ADJUVANTED, 65 YRS =, IM, PF, PREFILL SYR, 0.5ML (FLUAD)   15. Vitamin D deficiency  Vitamin D 25 Hydroxy   16. Heart murmur  ECHO Complete 2D W Doppler W Color   17. Fatigue, unspecified type  ECHO Complete 2D W Doppler W Color   18. Obstructive sleep apnea syndrome     19. Anxiety  LORazepam (ATIVAN) 0.5 MG tablet   20.  Dizziness           Plan:     Orders Placed This Encounter   Procedures    INFLUENZA, QUADV, ADJUVANTED, 72 YRS =, IM, PF, PREFILL SYR, 0.5ML (FLUAD)    Basic Metabolic Panel     Standing Status:   Future     Standing Expiration Date:   10/1/2021    CBC     Standing Status:   Future     Standing Expiration Date:   10/1/2021    Hemoglobin A1C     Standing Status:   Future     Standing Expiration Date:   10/1/2021    Vitamin B12     Standing Status:   Future     Standing Expiration Date:   10/1/2021    Vitamin D 25 Hydroxy     Standing Status:   Future     Standing Expiration Date:   10/1/2021    ECHO Complete 2D W Doppler W Color     Standing Status:   Future     Standing Expiration Date:   10/1/2021     Order Specific Question:   Reason for exam:     Answer:   heart murmur, fatigue      Orders Placed This Encounter   Medications    cyanocobalamin injection 1,000 mcg    LORazepam (ATIVAN) 0.5 MG tablet     Sig: Take 1 tablet by mouth 2 times daily as needed for Anxiety for up to 30 days. Dispense:  60 tablet     Refill:  0     Continue with ADA diet, current diabetic meds, and monitoring BS's as instructed    Cont with daily foot exams and yearly eye exams    No Ace-I/ARB secondary to angioedema    Increase fluids throughout the day    Will cont with low fat/chol diet and Lipitor as ordered    Will cont with the Cymbalta and Ativan as prescribed as she is doing well    Will give her the B12 injection here in the office and start OTC B12 daily    I am going to proceed with an ECHO secondary to the heart murmur and dizzy spells    Pt stable on CPAP with her sleep apnea and has been using nightly and getting benefit from its use. Will cont to follow with Urology as instructed for her interstim settings    Rest of systems unchanged, continue current treatments. Medications, labs, diagnostic studies, consultations and follow-up as documented in this encounter. Rest of systems unchanged, continue current treatments    Alina Kurtz MD

## 2020-10-07 RX ORDER — BUDESONIDE AND FORMOTEROL FUMARATE DIHYDRATE 80; 4.5 UG/1; UG/1
2 AEROSOL RESPIRATORY (INHALATION) 2 TIMES DAILY
Qty: 1 INHALER | Refills: 11 | Status: SHIPPED | OUTPATIENT
Start: 2020-10-07

## 2020-10-12 RX ORDER — LEVOTHYROXINE SODIUM 0.05 MG/1
TABLET ORAL
Qty: 90 TABLET | Refills: 3 | Status: SHIPPED | OUTPATIENT
Start: 2020-10-12 | End: 2021-04-05 | Stop reason: SDUPTHER

## 2020-10-28 LAB
LEFT VENTRICULAR EJECTION FRACTION HIGH VALUE: NORMAL
LEFT VENTRICULAR EJECTION FRACTION MODE: NORMAL
LV EF: NORMAL %

## 2021-01-04 DIAGNOSIS — D50.9 IRON DEFICIENCY ANEMIA, UNSPECIFIED IRON DEFICIENCY ANEMIA TYPE: ICD-10-CM

## 2021-01-04 DIAGNOSIS — E11.8 TYPE 2 DIABETES MELLITUS WITH COMPLICATION, WITHOUT LONG-TERM CURRENT USE OF INSULIN (HCC): ICD-10-CM

## 2021-01-04 DIAGNOSIS — E55.9 VITAMIN D DEFICIENCY: ICD-10-CM

## 2021-01-04 DIAGNOSIS — E53.8 B12 DEFICIENCY: ICD-10-CM

## 2021-01-04 DIAGNOSIS — I10 ESSENTIAL HYPERTENSION: ICD-10-CM

## 2021-01-04 DIAGNOSIS — N18.32 STAGE 3B CHRONIC KIDNEY DISEASE (HCC): ICD-10-CM

## 2021-01-04 LAB
AVERAGE GLUCOSE: 137
BASOPHILS ABSOLUTE: NORMAL
BASOPHILS RELATIVE PERCENT: NORMAL
BUN BLDV-MCNC: 28 MG/DL
CALCIUM SERPL-MCNC: 10.2 MG/DL
CHLORIDE BLD-SCNC: 104 MMOL/L
CO2: 23 MMOL/L
CREAT SERPL-MCNC: 1.5 MG/DL
EOSINOPHILS ABSOLUTE: NORMAL
EOSINOPHILS RELATIVE PERCENT: NORMAL
GFR CALCULATED: 33
GLUCOSE BLD-MCNC: 156 MG/DL
HBA1C MFR BLD: 6.4 %
HCT VFR BLD CALC: 37.2 % (ref 36–46)
HEMOGLOBIN: 12.4 G/DL (ref 12–16)
LYMPHOCYTES ABSOLUTE: NORMAL
LYMPHOCYTES RELATIVE PERCENT: NORMAL
MCH RBC QN AUTO: NORMAL PG
MCHC RBC AUTO-ENTMCNC: NORMAL G/DL
MCV RBC AUTO: NORMAL FL
MONOCYTES ABSOLUTE: NORMAL
MONOCYTES RELATIVE PERCENT: NORMAL
NEUTROPHILS ABSOLUTE: NORMAL
NEUTROPHILS RELATIVE PERCENT: NORMAL
PLATELET # BLD: 365 K/ΜL
PMV BLD AUTO: NORMAL FL
POTASSIUM SERPL-SCNC: 4.1 MMOL/L
RBC # BLD: NORMAL 10*6/UL
SODIUM BLD-SCNC: 140 MMOL/L
VITAMIN B-12: 1039
VITAMIN D 25-HYDROXY: 27.7
VITAMIN D2, 25 HYDROXY: NORMAL
VITAMIN D3,25 HYDROXY: NORMAL
WBC # BLD: 9.2 10^3/ML

## 2021-01-05 ENCOUNTER — OFFICE VISIT (OUTPATIENT)
Dept: FAMILY MEDICINE CLINIC | Age: 86
End: 2021-01-05
Payer: COMMERCIAL

## 2021-01-05 VITALS
HEART RATE: 56 BPM | OXYGEN SATURATION: 95 % | RESPIRATION RATE: 16 BRPM | WEIGHT: 218 LBS | DIASTOLIC BLOOD PRESSURE: 68 MMHG | SYSTOLIC BLOOD PRESSURE: 114 MMHG | BODY MASS INDEX: 41.16 KG/M2 | TEMPERATURE: 98.9 F | HEIGHT: 61 IN

## 2021-01-05 DIAGNOSIS — Z86.73 HISTORY OF CVA (CEREBROVASCULAR ACCIDENT): ICD-10-CM

## 2021-01-05 DIAGNOSIS — E55.9 VITAMIN D DEFICIENCY: ICD-10-CM

## 2021-01-05 DIAGNOSIS — K21.9 GASTROESOPHAGEAL REFLUX DISEASE WITHOUT ESOPHAGITIS: ICD-10-CM

## 2021-01-05 DIAGNOSIS — F32.A DEPRESSION, UNSPECIFIED DEPRESSION TYPE: ICD-10-CM

## 2021-01-05 DIAGNOSIS — E78.00 PURE HYPERCHOLESTEROLEMIA: ICD-10-CM

## 2021-01-05 DIAGNOSIS — F41.9 ANXIETY: ICD-10-CM

## 2021-01-05 DIAGNOSIS — E03.9 HYPOTHYROIDISM, UNSPECIFIED TYPE: ICD-10-CM

## 2021-01-05 DIAGNOSIS — J45.20 MILD INTERMITTENT ASTHMA WITHOUT COMPLICATION: ICD-10-CM

## 2021-01-05 DIAGNOSIS — D50.9 IRON DEFICIENCY ANEMIA, UNSPECIFIED IRON DEFICIENCY ANEMIA TYPE: ICD-10-CM

## 2021-01-05 DIAGNOSIS — E11.8 TYPE 2 DIABETES MELLITUS WITH COMPLICATION, WITHOUT LONG-TERM CURRENT USE OF INSULIN (HCC): ICD-10-CM

## 2021-01-05 DIAGNOSIS — E53.8 B12 DEFICIENCY: ICD-10-CM

## 2021-01-05 DIAGNOSIS — N30.20 CYSTITIS, CHRONIC: ICD-10-CM

## 2021-01-05 DIAGNOSIS — N18.32 STAGE 3B CHRONIC KIDNEY DISEASE (HCC): ICD-10-CM

## 2021-01-05 DIAGNOSIS — Z23 NEED FOR PROPHYLACTIC VACCINATION AND INOCULATION AGAINST VARICELLA: ICD-10-CM

## 2021-01-05 DIAGNOSIS — I10 ESSENTIAL HYPERTENSION: Primary | ICD-10-CM

## 2021-01-05 DIAGNOSIS — N39.41 URGE INCONTINENCE: ICD-10-CM

## 2021-01-05 PROCEDURE — 99214 OFFICE O/P EST MOD 30 MIN: CPT | Performed by: FAMILY MEDICINE

## 2021-01-05 RX ORDER — ERGOCALCIFEROL 1.25 MG/1
50000 CAPSULE ORAL WEEKLY
Qty: 12 CAPSULE | Refills: 0 | Status: SHIPPED | OUTPATIENT
Start: 2021-01-05 | End: 2021-04-05 | Stop reason: ALTCHOICE

## 2021-01-05 ASSESSMENT — ENCOUNTER SYMPTOMS
CONSTIPATION: 0
DIARRHEA: 0
NAUSEA: 0
VOMITING: 0
SORE THROAT: 0
BACK PAIN: 0
ABDOMINAL DISTENTION: 0
SHORTNESS OF BREATH: 0
COUGH: 0
CHEST TIGHTNESS: 0
RHINORRHEA: 0
ABDOMINAL PAIN: 0

## 2021-01-05 ASSESSMENT — PATIENT HEALTH QUESTIONNAIRE - PHQ9
1. LITTLE INTEREST OR PLEASURE IN DOING THINGS: 1
2. FEELING DOWN, DEPRESSED OR HOPELESS: 1
SUM OF ALL RESPONSES TO PHQ QUESTIONS 1-9: 2

## 2021-01-05 NOTE — PROGRESS NOTES
HYPERTENSION visit     BP Readings from Last 3 Encounters:   10/01/20 112/62   11/11/19 (!) 159/83   10/18/19 138/70       LDL Calculated (mg/dL)   Date Value   09/23/2020 63     LDL Cholesterol (mg/dL)   Date Value   07/08/2014 54     HDL (mg/dL)   Date Value   09/23/2020 43     BUN (mg/dL)   Date Value   12/31/2020 28     CREATININE (no units)   Date Value   12/31/2020 1.50     Glucose (mg/dL)   Date Value   12/31/2020 156              Have you changed or started any medications since your last visit including any over-the-counter medicines, vitamins, or herbal medicines? no   Have you stopped taking any of your medications? Is so, why? -  no  Are you having any side effects from any of your medications? - no  How often do you miss doses of your medication? rare      Have you seen any other physician or provider since your last visit?  no   Have you had any other diagnostic tests since your last visit? yes - labs, echo   Have you been seen in the emergency room and/or had an admission in a hospital since we last saw you?  no   Have you had your routine dental cleaning in the past 6 months?  yes -      Do you have an active MyChart account? If no, what is the barrier?   Yes    Patient Care Team:  Ruht Fountain MD as PCP - General (Family Medicine)  Ruth Fountain MD as PCP - [facility] Provider  Neva Lacey MD as Consulting Physician (Gastroenterology)  Froylan Howard DPM as Consulting Physician (Podiatry)  Gladstone Castleman, OD as Consulting Physician (Optometry)  Lawrence Rosas MD as Consulting Physician (Urology)  Lorriane Crigler, MD as Surgeon (General Surgery)    Medical History Review  Past Medical, Family, and Social History reviewed and does contribute to the patient presenting condition    Health Maintenance   Topic Date Due    Shingles Vaccine (1 of 2) 06/18/1987    Annual Wellness Visit (AWV)  05/29/2019    DTaP/Tdap/Td vaccine (1 - Tdap) 11/29/2021 (Originally 6/18/1954)  Lipid screen  09/23/2021    TSH testing  09/23/2021    Potassium monitoring  12/31/2021    Creatinine monitoring  12/31/2021    Flu vaccine  Completed    Pneumococcal 65+ years Vaccine  Completed    DEXA (modify frequency per FRAX score)  Addressed    Hepatitis A vaccine  Aged Out    Hib vaccine  Aged Out    Meningococcal (ACWY) vaccine  Aged Out     Patient/Caregiver verbalize understanding of medications.   Yes

## 2021-01-05 NOTE — PROGRESS NOTES
 blood glucose monitor strips Indications: NO FURTHER REFILLS UNTIL SEEN IN THE OFFICE. Please supply patient with test strip that insurance covers, tests weekly and PRN. 100 strip 0    dilTIAZem (CARTIA XT) 240 MG extended release capsule Take 1 capsule by mouth daily 90 capsule 3    glimepiride (AMARYL) 2 MG tablet Take 1 tablet by mouth 2 times daily 180 tablet 3    atorvastatin (LIPITOR) 10 MG tablet Take 1 tablet by mouth daily 30 tablet 11    furosemide (LASIX) 20 MG tablet Take 1 tablet by mouth daily 30 tablet 11    potassium chloride (KLOR-CON) 10 MEQ extended release tablet Take 1 tablet by mouth daily 30 tablet 11    DULoxetine (CYMBALTA) 60 MG extended release capsule take 1 capsule by mouth once daily 30 capsule 11    cetirizine (ZYRTEC) 10 MG tablet Take 10 mg by mouth daily as needed.  Multiple Vitamin (THERA) TABS Take 1 tablet by mouth daily.  aspirin 81 MG EC tablet Take 81 mg by mouth daily.  polyethylene glycol (GLYCOLAX) packet Take 17 g by mouth daily  0     No current facility-administered medications on file prior to visit. Subjective:     Review of Systems   Constitutional: Negative for appetite change, fatigue and fever. HENT: Negative for congestion, ear pain, rhinorrhea and sore throat. Respiratory: Negative for cough, chest tightness and shortness of breath. Cardiovascular: Negative for chest pain and palpitations. Gastrointestinal: Negative for abdominal distention, abdominal pain, constipation, diarrhea, nausea and vomiting. Occ heartburn   Genitourinary: Positive for difficulty urinating (incontinence). Negative for dysuria. Musculoskeletal: Positive for arthralgias and gait problem (walks with a walker). Negative for back pain and myalgias. Skin: Negative for rash. Neurological: Positive for weakness (left side). Negative for dizziness, light-headedness and headaches. Hematological: Negative for adenopathy. Psychiatric/Behavioral: Positive for dysphoric mood. Negative for behavioral problems and sleep disturbance. The patient is nervous/anxious. Objective:     Physical Exam  Vitals signs reviewed. Constitutional:       General: She is not in acute distress. Appearance: She is well-developed. HENT:      Head: Normocephalic and atraumatic. Right Ear: External ear normal.      Left Ear: External ear normal.      Nose: Nose normal.      Mouth/Throat:      Pharynx: No oropharyngeal exudate. Eyes:      Conjunctiva/sclera: Conjunctivae normal.      Pupils: Pupils are equal, round, and reactive to light. Neck:      Musculoskeletal: Normal range of motion. Cardiovascular:      Rate and Rhythm: Normal rate and regular rhythm. Heart sounds: Normal heart sounds. No murmur. Pulmonary:      Effort: Pulmonary effort is normal. No respiratory distress. Breath sounds: Normal breath sounds. No wheezing. Chest:      Chest wall: No tenderness. Abdominal:      General: Bowel sounds are normal. There is no distension. Palpations: Abdomen is soft. There is no mass. Tenderness: There is no abdominal tenderness. Musculoskeletal: Normal range of motion. General: No tenderness. Lymphadenopathy:      Cervical: No cervical adenopathy. Skin:     Findings: No rash. Neurological:      Mental Status: She is alert and oriented to person, place, and time. Deep Tendon Reflexes: Reflexes are normal and symmetric. Psychiatric:         Behavior: Behavior normal.     Labs reviewed with pt today. Assessment:      Diagnosis Orders   1. Essential hypertension  CBC    Comprehensive Metabolic Panel   2. Pure hypercholesterolemia  Lipid Panel   3. Type 2 diabetes mellitus with complication, without long-term current use of insulin (HCC)  Comprehensive Metabolic Panel    Hemoglobin A1C   4.  Hypothyroidism, unspecified type  T4, Free    TSH without Reflex Dispense:  12 capsule     Refill:  0     Continue with ADA diet, current diabetic meds, and monitoring BS's as instructed    Cont with daily foot exams and yearly eye exams    No Ace-I/ARB secondary to angioedema    Will cont with low fat/chol diet and Lipitor  as ordered    Stay well hydrated and no NSAID's    Will use the Lasix and potassium supplement prn for LE swelling instead of on a daily basis    Will start high dose Vit D and recheck level in 3 months    Will cont with the Cymbalta as prescribed as she is doing well    Rest of systems unchanged, continue current treatments. Medications, labs, diagnostic studies, consultations and follow-up as documented in this encounter. Rest of systems unchanged, continue current treatments    I have spent 15 minutes face to face with the patient more than 50% of this time was spent counseling and coordinating care. Alina So MD

## 2021-02-27 ENCOUNTER — PATIENT MESSAGE (OUTPATIENT)
Dept: FAMILY MEDICINE CLINIC | Age: 86
End: 2021-02-27

## 2021-03-01 NOTE — TELEPHONE ENCOUNTER
From: Raina Pinzon  To: Lyudmila Huston MD  Sent: 2/27/2021 2:43 PM EST  Subject: Non-Urgent Medical Question    We just wanted to let you know Mick Donald received her 2nd COVID vaccine shot(Moderna) today, 2/27/21.     aSmeera Pickens

## 2021-03-03 ENCOUNTER — OFFICE VISIT (OUTPATIENT)
Dept: UROLOGY | Age: 86
End: 2021-03-03
Payer: COMMERCIAL

## 2021-03-03 VITALS
TEMPERATURE: 98.3 F | SYSTOLIC BLOOD PRESSURE: 148 MMHG | HEART RATE: 81 BPM | BODY MASS INDEX: 42.8 KG/M2 | HEIGHT: 60 IN | WEIGHT: 218 LBS | DIASTOLIC BLOOD PRESSURE: 74 MMHG

## 2021-03-03 DIAGNOSIS — N39.3 FEMALE STRESS INCONTINENCE: ICD-10-CM

## 2021-03-03 DIAGNOSIS — R35.1 NOCTURIA: ICD-10-CM

## 2021-03-03 DIAGNOSIS — N39.41 URGE INCONTINENCE: Primary | ICD-10-CM

## 2021-03-03 DIAGNOSIS — R60.0 LOCALIZED EDEMA: ICD-10-CM

## 2021-03-03 PROCEDURE — 99212 OFFICE O/P EST SF 10 MIN: CPT | Performed by: NURSE PRACTITIONER

## 2021-03-03 PROCEDURE — 95972 ALYS CPLX SP/PN NPGT W/PRGRM: CPT | Performed by: NURSE PRACTITIONER

## 2021-03-03 RX ORDER — ERGOCALCIFEROL 1.25 MG/1
CAPSULE ORAL
COMMUNITY
Start: 2021-01-05 | End: 2021-04-05 | Stop reason: ALTCHOICE

## 2021-03-03 ASSESSMENT — ENCOUNTER SYMPTOMS
COUGH: 0
NAUSEA: 0
VOMITING: 0
CONSTIPATION: 0
WHEEZING: 0
BACK PAIN: 1
EYE REDNESS: 0
ABDOMINAL PAIN: 0
EYE PAIN: 0
SHORTNESS OF BREATH: 0

## 2021-03-03 NOTE — PROGRESS NOTES
[address] [address]  Dept: [address] [facility] Urology Office Note - Established    Patient:  Suzi Belcher  YOB: 1935  Date: 3/3/2021    The patient is a 82 y.o. female who presents todayfor evaluation of the following problems:   Chief Complaint   Patient presents with    Incontinence     6 month interstim check up-\"sometimes having incontinence not all the time\"       HPI  Here with son for interstim check. Her settings are Program 3 amp 2.3 - her son has increased the amplitude a little since the last visit. She is using 2-3 briefs per day. Her morning pad is often a big gush that soaks that brief. The nighttime bed pads are damper than as reported by her son. She has more frequency and urgency. Lasix was changed to as needed since January. Continues Oxybutynin daily. Denies constipation. Summary of old records: N/A    Additional History: N/A    Procedures Today:  Interstim programming:     The programming head was placed over the implanted neurostimulator. Previous programed electrode selectionsettings:     Program 3 amp 2.3 amps    Final electrode setting:  Program 5, amps 1.5 amps    Impedence all under 4000 ohms verified:yes    Battery life checked:yes    Sensation in pelvic area. Rt buttocks  Urinalysis today:  No results found for this visit on 03/03/21.   Last several PSA's:  No results found for: PSA  Last total testosterone:  No results found for: TESTOSTERONE    AUA Symptom Score (3/3/2021):                               Last BUN and creatinine:  Lab Results   Component Value Date    BUN 28 12/31/2020     Lab Results   Component Value Date    CREATININE 1.50 12/31/2020       Additional Lab/Culture results:     Imaging Reviewed during this Office Visit:   (results were independently reviewed by physician and radiology report verified) PAST MEDICAL, FAMILY AND SOCIAL HISTORY UPDATE:  Past Medical History:   Diagnosis Date    Diverticulosis     Hyperlipidemia     Hypertension     Hypokalemia     Iron deficiency anemia     Menopause     s/p hysterectomy    Osteoarthritis     bilat hands, hips, knees    S/P partial colectomy 2/2002    Stroke ([address]     Thrombocytosis ([facility] [address]     Type II or unspecified type diabetes mellitus without mention of complication, not stated as uncontrolled 02/2007    Unspecified sleep apnea 08/2004    uses bipap    UTI (lower urinary tract infection)     re-curring     Past Surgical History:   Procedure Laterality Date    APPENDECTOMY  1993    CARDIOVASCULAR STRESS TEST  03/12    CARPAL TUNNEL RELEASE  1980's    bilat    CHOLECYSTECTOMY  1969    COLON SURGERY  1993    partial colectomy, d/t stricture    COLONOSCOPY  2001    CYSTOCELE REPAIR  1968    HERNIA REPAIR  09/27/2016    ventral/per Dr. Helen Cordoba    partial, d/t uterine prolapse    [facility]    lumbar fusion with cement    TONSILLECTOMY  1947    UPPER GASTROINTESTINAL ENDOSCOPY  03/08/2018    Dr. Iza Chery, WNL     Family History   Problem Relation Age of Onset    Heart Attack Mother     High Blood Pressure Mother     Heart Attack Father     High Blood Pressure Father     Colon Cancer Sister     Diabetes Maternal Aunt     Cancer Other         mcousin, colon, breast ca     Outpatient Medications Marked as Taking for the 3/3/21 encounter (Office Visit) with MARIBETH Jarrett CNP   Medication Sig Dispense Refill    vitamin D (ERGOCALCIFEROL) 1.25 MG (70210 UT) CAPS capsule take 1 capsule by mouth every week      zoster recombinant adjuvanted vaccine (SHINGRIX) 50 MCG/0.5ML SUSR injection 50 MCG IM then repeat 2-6 months.  0.5 mL 1    cyanocobalamin 1000 MCG tablet Take 1,000 mcg by mouth daily  vitamin D (ERGOCALCIFEROL) 1.25 MG (75072 UT) CAPS capsule Take 1 capsule by mouth once a week 12 capsule 0    metFORMIN (GLUCOPHAGE) 500 MG tablet take 1 tablet by mouth every morning 90 tablet 3    levothyroxine (SYNTHROID) 50 MCG tablet take 1 tablet by mouth once daily 90 tablet 3    budesonide-formoterol (SYMBICORT) 80-4.5 MCG/ACT AERO Inhale 2 puffs into the lungs 2 times daily 1 Inhaler 11    Respiratory Therapy Supplies EFFIE PLEASE SUPPLY PATIENT WITH ALL NECESSARY SUPPLIES FOR HER CPAP MACHINE. 1 Device 0    oxybutynin (DITROPAN-XL) 5 MG extended release tablet Take 1 tablet by mouth daily 90 tablet 3    Lancets MISC Indications: NO FURTHER REFILLS UNTIL SEEN IN THE OFFICE. Please dispense according to patients insurance/device. Patient testing weekly, and  each 0    blood glucose monitor strips Indications: NO FURTHER REFILLS UNTIL SEEN IN THE OFFICE. Please supply patient with test strip that insurance covers, tests weekly and PRN. 100 strip 0    dilTIAZem (CARTIA XT) 240 MG extended release capsule Take 1 capsule by mouth daily 90 capsule 3    glimepiride (AMARYL) 2 MG tablet Take 1 tablet by mouth 2 times daily 180 tablet 3    atorvastatin (LIPITOR) 10 MG tablet Take 1 tablet by mouth daily 30 tablet 11    furosemide (LASIX) 20 MG tablet Take 1 tablet by mouth daily 30 tablet 11    potassium chloride (KLOR-CON) 10 MEQ extended release tablet Take 1 tablet by mouth daily 30 tablet 11    DULoxetine (CYMBALTA) 60 MG extended release capsule take 1 capsule by mouth once daily 30 capsule 11    polyethylene glycol (GLYCOLAX) packet Take 17 g by mouth daily  0    cetirizine (ZYRTEC) 10 MG tablet Take 10 mg by mouth daily as needed.  Multiple Vitamin (THERA) TABS Take 1 tablet by mouth daily.  aspirin 81 MG EC tablet Take 81 mg by mouth daily.            Pcn [penicillins], Demerol, Effexor [venlafaxine], Lisinopril, and Macrobid [nitrofurantoin monohydrate macrocrystals] Social History     Tobacco Use   Smoking Status Never Smoker   Smokeless Tobacco Never Used     (Ifpatient a smoker, smoking cessation counseling offered)    Social History     Substance and Sexual Activity   Alcohol Use No       REVIEW OF SYSTEMS:  Review of Systems    Physical Exam:      Vitals:    03/03/21 1427   BP: (!) 148/74   Pulse: 81   Temp: 98.3 °F (36.8 °C)     Body mass index is 42.5 kg/m². Patient is a 82 y.o. male in no acute distress and alert and oriented to person, place and time. Physical Exam  Constitutional: Patient in no acute distress. Neuro: Alert and oriented to person, place and time. Psych: Mood normal, affect normal  Skin: warm, pink, No rash noted  HEENT: Head: Normocephalic andatraumatic  Conjunctivae and EOM are normal. Pupils are equal, round  Nose:Normal  Right External Ear: Normal; Left External Ear: Normal  Mouth: Mucosa Moist  Neck: Supple  Lungs: Respiratory effort is normal  Cardiovascular: Warm & Pink  Abdomen: Soft, non-tender, non-distended   Bladder non-tender and not distended. Musculoskeletal: WC      Assessment and Plan      1. Urge incontinence    2. Localized edema    3. Nocturia    4. Female stress incontinence           Plan:     changes made on interstim. Decrease fluids 2 hours before bed    Elevate legs when sitting    Continue Furosemide ( Lasix) per PCP guidelines. Follow up in 6-8 weeks  No follow-ups on file. Prescriptions Ordered:  No orders of the defined types were placed in this encounter. Orders Placed:  No orders of the defined types were placed in this encounter. MARIBETH Lemus CNP    reviewed and Agree with the ROS entered by the MA.

## 2021-03-03 NOTE — PATIENT INSTRUCTIONS
Advanced changes made on interstim. Decrease fluids 2 hours before bed    Elevate legs when sitting    Continue Furosemide ( Lasix) per PCP guidelines.      Follow up in 6-8 weeks

## 2021-03-03 NOTE — PROGRESS NOTES
Review of Systems   Constitutional: Negative for chills, fatigue and fever. Eyes: Negative for pain, redness and visual disturbance. Respiratory: Negative for cough, shortness of breath and wheezing. Cardiovascular: Negative for chest pain and leg swelling. Gastrointestinal: Negative for abdominal pain, constipation, nausea and vomiting. Genitourinary: Positive for urgency (\"sometimes\"). Negative for difficulty urinating, dysuria, flank pain, frequency and hematuria. Musculoskeletal: Positive for back pain. Negative for joint swelling and myalgias. Skin: Negative for rash and wound. Neurological: Negative for dizziness, tremors and numbness. Hematological: Does not bruise/bleed easily.

## 2021-03-03 NOTE — LETTER
[address] [address]  Dept: [phone]  Dept Fax: [phone]        3/3/21    Patient: Patsy Bourgeois  YOB: 1935    Dear Tony Patel MD,    I had the pleasure of seeing one of your patients, Oracio Rajput today in the office today. Below are the relevant portions of my assessment and plan of care. IMPRESSION:  1. Urge incontinence    2. Localized edema    3. Nocturia    4. Female stress incontinence        PLAN:  changes made on interstim. Decrease fluids 2 hours before bed    Elevate legs when sitting    Continue Furosemide ( Lasix) per PCP guidelines. Follow up in 6-8 weeks  No follow-ups on file. Prescriptions Ordered:  No orders of the defined types were placed in this encounter. Orders Placed:  No orders of the defined types were placed in this encounter. Thank you for allowing me to participate in the care of this patient. I will keep you updated on this patient's follow up and I look forward to serving you and your patients again in the future.     Bo Gonzalez, MARIBETH - CNP

## 2021-03-29 LAB
ALBUMIN SERPL-MCNC: 4 G/DL
ALP BLD-CCNC: 67 U/L
ALT SERPL-CCNC: 21 U/L
ANION GAP SERPL CALCULATED.3IONS-SCNC: 15 MMOL/L
AST SERPL-CCNC: 19 U/L
AVERAGE GLUCOSE: 140
BASOPHILS ABSOLUTE: NORMAL
BASOPHILS RELATIVE PERCENT: NORMAL
BILIRUB SERPL-MCNC: 0.4 MG/DL (ref 0.1–1.4)
BUN BLDV-MCNC: 17 MG/DL
CALCIUM SERPL-MCNC: 9.7 MG/DL
CHLORIDE BLD-SCNC: 102 MMOL/L
CHOLESTEROL, TOTAL: 152 MG/DL
CHOLESTEROL/HDL RATIO: 3.7
CO2: 22 MMOL/L
CREAT SERPL-MCNC: 1.35 MG/DL
EOSINOPHILS ABSOLUTE: NORMAL
EOSINOPHILS RELATIVE PERCENT: NORMAL
GFR CALCULATED: NORMAL
GLUCOSE BLD-MCNC: 176 MG/DL
HBA1C MFR BLD: 6.5 %
HCT VFR BLD CALC: 39.5 % (ref 36–46)
HDLC SERPL-MCNC: 41 MG/DL (ref 35–70)
HEMOGLOBIN: 13 G/DL (ref 12–16)
LDL CHOLESTEROL CALCULATED: 62 MG/DL (ref 0–160)
LYMPHOCYTES ABSOLUTE: NORMAL
LYMPHOCYTES RELATIVE PERCENT: NORMAL
MCH RBC QN AUTO: NORMAL PG
MCHC RBC AUTO-ENTMCNC: NORMAL G/DL
MCV RBC AUTO: NORMAL FL
MONOCYTES ABSOLUTE: NORMAL
MONOCYTES RELATIVE PERCENT: NORMAL
NEUTROPHILS ABSOLUTE: NORMAL
NEUTROPHILS RELATIVE PERCENT: NORMAL
NONHDLC SERPL-MCNC: NORMAL MG/DL
PLATELET # BLD: 345 K/ΜL
PMV BLD AUTO: NORMAL FL
POTASSIUM SERPL-SCNC: 3.8 MMOL/L
RBC # BLD: 4.33 10^6/ΜL
SODIUM BLD-SCNC: 139 MMOL/L
T4 FREE: 0.84
TOTAL PROTEIN: 7.4
TRIGL SERPL-MCNC: 245 MG/DL
TSH SERPL DL<=0.05 MIU/L-ACNC: 4.85 UIU/ML
VITAMIN B-12: 1391
VLDLC SERPL CALC-MCNC: 49 MG/DL
WBC # BLD: 9 10^3/ML

## 2021-03-30 DIAGNOSIS — I10 ESSENTIAL HYPERTENSION: ICD-10-CM

## 2021-03-30 DIAGNOSIS — E03.9 HYPOTHYROIDISM, UNSPECIFIED TYPE: ICD-10-CM

## 2021-03-30 DIAGNOSIS — E53.8 B12 DEFICIENCY: ICD-10-CM

## 2021-03-30 DIAGNOSIS — D50.9 IRON DEFICIENCY ANEMIA, UNSPECIFIED IRON DEFICIENCY ANEMIA TYPE: ICD-10-CM

## 2021-03-30 DIAGNOSIS — E11.8 TYPE 2 DIABETES MELLITUS WITH COMPLICATION, WITHOUT LONG-TERM CURRENT USE OF INSULIN (HCC): ICD-10-CM

## 2021-03-30 DIAGNOSIS — E78.00 PURE HYPERCHOLESTEROLEMIA: ICD-10-CM

## 2021-03-30 DIAGNOSIS — N18.32 STAGE 3B CHRONIC KIDNEY DISEASE (HCC): ICD-10-CM

## 2021-03-30 DIAGNOSIS — E55.9 VITAMIN D DEFICIENCY: ICD-10-CM

## 2021-04-05 ENCOUNTER — OFFICE VISIT (OUTPATIENT)
Dept: FAMILY MEDICINE CLINIC | Age: 86
End: 2021-04-05
Payer: COMMERCIAL

## 2021-04-05 VITALS
HEART RATE: 68 BPM | SYSTOLIC BLOOD PRESSURE: 134 MMHG | BODY MASS INDEX: 45.23 KG/M2 | RESPIRATION RATE: 16 BRPM | WEIGHT: 232 LBS | OXYGEN SATURATION: 95 % | DIASTOLIC BLOOD PRESSURE: 86 MMHG

## 2021-04-05 DIAGNOSIS — D50.9 IRON DEFICIENCY ANEMIA, UNSPECIFIED IRON DEFICIENCY ANEMIA TYPE: ICD-10-CM

## 2021-04-05 DIAGNOSIS — I10 ESSENTIAL HYPERTENSION: Primary | ICD-10-CM

## 2021-04-05 DIAGNOSIS — R29.898 WEAKNESS OF LEFT LEG: ICD-10-CM

## 2021-04-05 DIAGNOSIS — K21.9 GASTROESOPHAGEAL REFLUX DISEASE WITHOUT ESOPHAGITIS: ICD-10-CM

## 2021-04-05 DIAGNOSIS — Z23 NEED FOR VACCINATION: ICD-10-CM

## 2021-04-05 DIAGNOSIS — N39.41 URGE INCONTINENCE: ICD-10-CM

## 2021-04-05 DIAGNOSIS — J45.20 MILD INTERMITTENT ASTHMA WITHOUT COMPLICATION: ICD-10-CM

## 2021-04-05 DIAGNOSIS — E11.8 TYPE 2 DIABETES MELLITUS WITH COMPLICATION, WITHOUT LONG-TERM CURRENT USE OF INSULIN (HCC): ICD-10-CM

## 2021-04-05 DIAGNOSIS — H61.23 BILATERAL IMPACTED CERUMEN: ICD-10-CM

## 2021-04-05 DIAGNOSIS — N30.20 CYSTITIS, CHRONIC: ICD-10-CM

## 2021-04-05 DIAGNOSIS — F32.A DEPRESSION, UNSPECIFIED DEPRESSION TYPE: ICD-10-CM

## 2021-04-05 DIAGNOSIS — E03.9 HYPOTHYROIDISM, UNSPECIFIED TYPE: ICD-10-CM

## 2021-04-05 DIAGNOSIS — N18.32 STAGE 3B CHRONIC KIDNEY DISEASE (HCC): ICD-10-CM

## 2021-04-05 DIAGNOSIS — F41.9 ANXIETY: ICD-10-CM

## 2021-04-05 DIAGNOSIS — Z86.73 HISTORY OF CVA (CEREBROVASCULAR ACCIDENT): ICD-10-CM

## 2021-04-05 DIAGNOSIS — E53.8 B12 DEFICIENCY: ICD-10-CM

## 2021-04-05 DIAGNOSIS — G47.33 OBSTRUCTIVE SLEEP APNEA SYNDROME: ICD-10-CM

## 2021-04-05 DIAGNOSIS — E78.00 PURE HYPERCHOLESTEROLEMIA: ICD-10-CM

## 2021-04-05 PROCEDURE — 99214 OFFICE O/P EST MOD 30 MIN: CPT | Performed by: FAMILY MEDICINE

## 2021-04-05 PROCEDURE — 69210 REMOVE IMPACTED EAR WAX UNI: CPT | Performed by: FAMILY MEDICINE

## 2021-04-05 RX ORDER — LEVOTHYROXINE SODIUM 0.07 MG/1
TABLET ORAL
Qty: 90 TABLET | Refills: 3 | Status: SHIPPED | OUTPATIENT
Start: 2021-04-05 | End: 2022-06-21

## 2021-04-05 ASSESSMENT — ENCOUNTER SYMPTOMS
SORE THROAT: 0
ABDOMINAL DISTENTION: 0
BACK PAIN: 0
CHEST TIGHTNESS: 0
DIARRHEA: 0
CONSTIPATION: 0
VOMITING: 0
NAUSEA: 0
COUGH: 0
RHINORRHEA: 0
SHORTNESS OF BREATH: 0
ABDOMINAL PAIN: 0

## 2021-04-05 NOTE — PROGRESS NOTES
Alina Rodriguez MD    [address]  [address] [address]  [address] [facility] [address] [address]  Dept: [phone]  Dept Fax: [phone]     Patient ID: Mitchell Callahan is a 82 y.o. female. HPI    Established patient here today for f/u on chronic medical problems, go over labs and/or diagnostic studies, and medication refills. Pt denies any fever or chills. Pt today denies any HA, chest pain, or SOB. Pt denies any N/V/D/C or abdominal pain. Pt with occasional heartburn, but stable on meds. Pt states mood is stable on meds. Pt states asthma is stable on inhalers. Pt states urinary incontinence stable on meds and she does wear a Depends when she goes out. Pt is c/o fatigue and ear fullness. Pt did go back on the Lasix daily. Otherwise pt doing well on current tx and no other concerns today. The patient's past medical, surgical, social, and family history as well as his current medications and allergies were reviewed as documented in today's encounter. My previous office notes, labs and diagnostic studies were reviewed prior to and during encounter. Current Outpatient Medications on File Prior to Visit   Medication Sig Dispense Refill    cyanocobalamin 1000 MCG tablet Take 1,000 mcg by mouth daily      metFORMIN (GLUCOPHAGE) 500 MG tablet take 1 tablet by mouth every morning 90 tablet 3    budesonide-formoterol (SYMBICORT) 80-4.5 MCG/ACT AERO Inhale 2 puffs into the lungs 2 times daily (Patient taking differently: Inhale 2 puffs into the lungs 2 times daily as needed ) 1 Inhaler 11    Respiratory Therapy Supplies EFFIE PLEASE SUPPLY PATIENT WITH ALL NECESSARY SUPPLIES FOR HER CPAP MACHINE. 1 Device 0    oxybutynin (DITROPAN-XL) 5 MG extended release tablet Take 1 tablet by mouth daily 90 tablet 3    Lancets MISC Indications: NO FURTHER REFILLS UNTIL SEEN IN THE OFFICE. Please dispense according to patients insurance/device.   Patient testing weekly, and  each 0    blood glucose monitor strips Indications: NO FURTHER REFILLS UNTIL SEEN IN THE OFFICE. Please supply patient with test strip that insurance covers, tests weekly and PRN. 100 strip 0    dilTIAZem (CARTIA XT) 240 MG extended release capsule Take 1 capsule by mouth daily 90 capsule 3    glimepiride (AMARYL) 2 MG tablet Take 1 tablet by mouth 2 times daily 180 tablet 3    atorvastatin (LIPITOR) 10 MG tablet Take 1 tablet by mouth daily 30 tablet 11    furosemide (LASIX) 20 MG tablet Take 1 tablet by mouth daily 30 tablet 11    potassium chloride (KLOR-CON) 10 MEQ extended release tablet Take 1 tablet by mouth daily 30 tablet 11    DULoxetine (CYMBALTA) 60 MG extended release capsule take 1 capsule by mouth once daily 30 capsule 11    polyethylene glycol (GLYCOLAX) packet Take 17 g by mouth daily as needed   0    cetirizine (ZYRTEC) 10 MG tablet Take 10 mg by mouth daily as needed.  Multiple Vitamin (THERA) TABS Take 1 tablet by mouth daily.  aspirin 81 MG EC tablet Take 81 mg by mouth daily. No current facility-administered medications on file prior to visit. Subjective:     Review of Systems   Constitutional: Positive for fatigue. Negative for appetite change and fever. HENT: Negative for congestion, ear pain, rhinorrhea and sore throat. Ear fullness   Respiratory: Negative for cough, chest tightness and shortness of breath. Cardiovascular: Negative for chest pain and palpitations. Gastrointestinal: Negative for abdominal distention, abdominal pain, constipation, diarrhea, nausea and vomiting. Occ heartburn   Genitourinary: Positive for difficulty urinating (incontinence - stable). Negative for dysuria. Musculoskeletal: Positive for gait problem (walks with a cane). Negative for arthralgias, back pain and myalgias. Skin: Negative for rash. Neurological: Positive for weakness (LLE).  Negative for dizziness, light-headedness and headaches. Hematological: Negative for adenopathy. Psychiatric/Behavioral: Positive for dysphoric mood. Negative for behavioral problems and sleep disturbance. The patient is nervous/anxious. Objective:     Physical Exam  Vitals signs reviewed. Constitutional:       General: She is not in acute distress. Appearance: She is well-developed. HENT:      Head: Normocephalic and atraumatic. Right Ear: External ear normal. There is impacted cerumen. Left Ear: External ear normal. There is impacted cerumen. Nose: Nose normal.      Mouth/Throat:      Pharynx: No oropharyngeal exudate. Eyes:      Conjunctiva/sclera: Conjunctivae normal.      Pupils: Pupils are equal, round, and reactive to light. Neck:      Musculoskeletal: Normal range of motion. Cardiovascular:      Rate and Rhythm: Normal rate and regular rhythm. Heart sounds: Normal heart sounds. No murmur. Pulmonary:      Effort: Pulmonary effort is normal. No respiratory distress. Breath sounds: Normal breath sounds. No wheezing. Chest:      Chest wall: No tenderness. Abdominal:      General: Bowel sounds are normal. There is no distension. Palpations: Abdomen is soft. There is no mass. Tenderness: There is no abdominal tenderness. Musculoskeletal: Normal range of motion. General: No tenderness. Lymphadenopathy:      Cervical: No cervical adenopathy. Skin:     Findings: No rash. Neurological:      Mental Status: She is alert and oriented to person, place, and time. Deep Tendon Reflexes: Reflexes are normal and symmetric. Psychiatric:         Behavior: Behavior normal.     B/L ear wash done today with a water and hand pic with good results. Pt tolerated well. Assessment:      Diagnosis Orders   1. Essential hypertension  CBC    Basic Metabolic Panel   2. Pure hypercholesterolemia     3.  Type 2 diabetes mellitus with complication, without long-term current use of insulin ([address] Continue with ADA diet, current diabetic meds, and monitoring BS's as instructed    No Ace-I/ARB secondary to angioedema    Cont with daily foot exams and yearly eye exams    Will cont with low fat/chol diet and Lipitor as ordered    Will cont with the Cartia as prescribed for BP control    Stay well hydrated and no NSAID's and will cont to monitor her kidney function    Will cont with the Cymbalta as prescribed as she is doing well    Will increase the Synthroid to 75mcg daily and recheck levels in 3 months    Rest of systems unchanged, continue current treatments. Medications, labs, diagnostic studies, consultations and follow-up as documented in this encounter. Rest of systems unchanged, continue current treatments    On this date April 5, 2021,  I have spent greater than 50% of visit reviewing previous notes, test results and face to face with the patient discussing the diagnoses, importance of compliance with the treatment plan, counseling, coordinating care as well as documenting on the day of the visit. Alina Wayne MD

## 2021-04-14 ENCOUNTER — OFFICE VISIT (OUTPATIENT)
Dept: UROLOGY | Age: 86
End: 2021-04-14
Payer: COMMERCIAL

## 2021-04-14 VITALS
SYSTOLIC BLOOD PRESSURE: 135 MMHG | TEMPERATURE: 95.3 F | WEIGHT: 232 LBS | HEIGHT: 60 IN | HEART RATE: 76 BPM | DIASTOLIC BLOOD PRESSURE: 78 MMHG | RESPIRATION RATE: 16 BRPM | BODY MASS INDEX: 45.55 KG/M2

## 2021-04-14 DIAGNOSIS — R39.15 URGENCY OF URINATION: ICD-10-CM

## 2021-04-14 DIAGNOSIS — N39.41 URGE INCONTINENCE: Primary | ICD-10-CM

## 2021-04-14 DIAGNOSIS — R35.0 URINARY FREQUENCY: ICD-10-CM

## 2021-04-14 DIAGNOSIS — R35.1 NOCTURIA: ICD-10-CM

## 2021-04-14 PROCEDURE — 95971 ALYS SMPL SP/PN NPGT W/PRGRM: CPT | Performed by: NURSE PRACTITIONER

## 2021-04-14 PROCEDURE — 99213 OFFICE O/P EST LOW 20 MIN: CPT | Performed by: NURSE PRACTITIONER

## 2021-04-14 ASSESSMENT — ENCOUNTER SYMPTOMS
DIARRHEA: 0
CONSTIPATION: 0
COUGH: 0
SHORTNESS OF BREATH: 0
VOMITING: 0
EYE REDNESS: 0
EYE PAIN: 0
BACK PAIN: 0
ABDOMINAL PAIN: 0
NAUSEA: 0
WHEEZING: 0

## 2021-04-14 NOTE — LETTER
[address] [address]  Dept: [phone]  Dept Fax: [phone]        4/15/21    Patient: Hayden Gray  YOB: 1935    Dear Jacki Martinez MD,    I had the pleasure of seeing one of your patients, Marni Antonio today in the office today. Below are the relevant portions of my assessment and plan of care. IMPRESSION:  1. Urge incontinence    2. Urgency of urination    3. Nocturia    4. Urinary frequency        PLAN:  interstim interrogation and complex change     Time urination every 2 hours during the Lasix, and consider comode if more difficult to get to BR with lasix to avoid leak, or fall     Continue Oxybutynin follow up in 3 months if doing okay, son can change intensity of interstim if needed, new program set. Call with questions or concerns sooner   Return in about 3 months (around 7/14/2021). Prescriptions Ordered:  No orders of the defined types were placed in this encounter. Orders Placed:  Orders Placed This Encounter   Procedures    AR ELEC STEVENSON IMPLT NPGT SMPL SP/PN NPGT [address]    AR ELEC STEVENSON IMPLT NPGT PHYS/QHP W/O PROGRAMMING        Thank you for allowing me to participate in the care of this patient. I will keep you updated on this patient's follow up and I look forward to serving you and your patients again in the future.     Preet Yip, APRN - CNP

## 2021-04-14 NOTE — PATIENT INSTRUCTIONS
interstim interrogation and complex change      Time urination every 2 hours during the Lasix, and consider comode if more difficult to get to BR with lasix to avoid leak, or fall      Continue Oxybutynin follow up in 3 months if doing okay, son can change intensity of interstim if needed, new program set.      Call with questions or concerns sooner

## 2021-04-14 NOTE — PROGRESS NOTES
into the muscle See Admin Instructions 1 dose now and repeat in 2-6 months 0.5 mL 0    metroNIDAZOLE (METROCREAM) 0.75 % cream Apply topically 2 times daily. 60 g 2    levothyroxine (SYNTHROID) 75 MCG tablet take 1 tablet by mouth once daily 90 tablet 3    cyanocobalamin 1000 MCG tablet Take 1,000 mcg by mouth daily      metFORMIN (GLUCOPHAGE) 500 MG tablet take 1 tablet by mouth every morning 90 tablet 3    budesonide-formoterol (SYMBICORT) 80-4.5 MCG/ACT AERO Inhale 2 puffs into the lungs 2 times daily (Patient taking differently: Inhale 2 puffs into the lungs 2 times daily as needed ) 1 Inhaler 11    Respiratory Therapy Supplies EFFIE PLEASE SUPPLY PATIENT WITH ALL NECESSARY SUPPLIES FOR HER CPAP MACHINE. 1 Device 0    oxybutynin (DITROPAN-XL) 5 MG extended release tablet Take 1 tablet by mouth daily 90 tablet 3    Lancets MISC Indications: NO FURTHER REFILLS UNTIL SEEN IN THE OFFICE. Please dispense according to patients insurance/device. Patient testing weekly, and  each 0    blood glucose monitor strips Indications: NO FURTHER REFILLS UNTIL SEEN IN THE OFFICE. Please supply patient with test strip that insurance covers, tests weekly and PRN. 100 strip 0    dilTIAZem (CARTIA XT) 240 MG extended release capsule Take 1 capsule by mouth daily 90 capsule 3    glimepiride (AMARYL) 2 MG tablet Take 1 tablet by mouth 2 times daily 180 tablet 3    atorvastatin (LIPITOR) 10 MG tablet Take 1 tablet by mouth daily 30 tablet 11    furosemide (LASIX) 20 MG tablet Take 1 tablet by mouth daily 30 tablet 11    potassium chloride (KLOR-CON) 10 MEQ extended release tablet Take 1 tablet by mouth daily 30 tablet 11    DULoxetine (CYMBALTA) 60 MG extended release capsule take 1 capsule by mouth once daily 30 capsule 11    polyethylene glycol (GLYCOLAX) packet Take 17 g by mouth daily as needed   0    cetirizine (ZYRTEC) 10 MG tablet Take 10 mg by mouth daily as needed.         Multiple Vitamin (THERA) TABS Take 1 tablet by mouth daily.  aspirin 81 MG EC tablet Take 81 mg by mouth daily. Pcn [penicillins], Demerol, Effexor [venlafaxine], Lisinopril, and Macrobid [nitrofurantoin monohydrate macrocrystals]  Social History     Tobacco Use   Smoking Status Never Smoker   Smokeless Tobacco Never Used     (Ifpatient a smoker, smoking cessation counseling offered)    Social History     Substance and Sexual Activity   Alcohol Use No       REVIEW OF SYSTEMS:  Review of Systems    Physical Exam:      Vitals:    04/14/21 1428   BP: 135/78   Pulse: 76   Resp: 16   Temp: 95.3 °F (35.2 °C)     Body mass index is 45.31 kg/m². Patient is a 82 y.o. male in no acute distress and alert and oriented to person, place and time. Physical Exam  Constitutional: Patient in no acute distress. Neuro: Alert and oriented to person, place and time. Psych: Mood normal, affect normal  Skin: No rash noted  HEENT: Head: Normocephalic andatraumatic  Conjunctivae and EOM are normal. Pupils are equal, round  Nose:Normal  Right External Ear: Normal; Left External Ear: Normal  Mouth: Mucosa Moist  Neck: Supple  Lungs: Respiratory effort is normal  Cardiovascular: Warm & Pink  Abdomen: Soft, non-tender, non-distended  Bladder non-tender and not distended. Musculoskeletal: WC      Assessment and Plan      1. Urge incontinence    2. Urgency of urination    3. Nocturia    4. Urinary frequency           Plan:     interstim interrogation and complex change     Time urination every 2 hours during the Lasix, and consider comode if more difficult to get to BR with lasix to avoid leak, or fall     Continue Oxybutynin follow up in 3 months if doing okay, son can change intensity of interstim if needed, new program set. Call with questions or concerns sooner   Return in about 3 months (around 7/14/2021). Prescriptions Ordered:  No orders of the defined types were placed in this encounter.     Orders Placed:  Orders Placed This Encounter

## 2021-05-17 DIAGNOSIS — E78.00 PURE HYPERCHOLESTEROLEMIA: ICD-10-CM

## 2021-05-18 RX ORDER — ATORVASTATIN CALCIUM 10 MG/1
10 TABLET, FILM COATED ORAL DAILY
Qty: 90 TABLET | Refills: 3 | Status: SHIPPED | OUTPATIENT
Start: 2021-05-18 | End: 2022-06-02

## 2021-05-18 RX ORDER — DULOXETIN HYDROCHLORIDE 60 MG/1
CAPSULE, DELAYED RELEASE ORAL
Qty: 90 CAPSULE | Refills: 3 | Status: SHIPPED | OUTPATIENT
Start: 2021-05-18 | End: 2022-04-04 | Stop reason: SDUPTHER

## 2021-06-01 RX ORDER — POTASSIUM CHLORIDE 750 MG/1
TABLET, FILM COATED, EXTENDED RELEASE ORAL
Qty: 30 TABLET | Refills: 11 | Status: SHIPPED | OUTPATIENT
Start: 2021-06-01 | End: 2022-06-02

## 2021-06-10 RX ORDER — FUROSEMIDE 20 MG/1
TABLET ORAL
Qty: 30 TABLET | Refills: 11 | Status: SHIPPED | OUTPATIENT
Start: 2021-06-10 | End: 2022-06-17

## 2021-06-29 RX ORDER — GLIMEPIRIDE 2 MG/1
TABLET ORAL
Qty: 180 TABLET | Refills: 3 | Status: SHIPPED | OUTPATIENT
Start: 2021-06-29 | End: 2021-10-05 | Stop reason: SDUPTHER

## 2021-07-08 DIAGNOSIS — F41.9 ANXIETY: ICD-10-CM

## 2021-07-08 RX ORDER — LORAZEPAM 0.5 MG/1
TABLET ORAL
Qty: 60 TABLET | Refills: 0 | Status: SHIPPED | OUTPATIENT
Start: 2021-07-08 | End: 2021-10-05 | Stop reason: SDUPTHER

## 2021-07-19 ENCOUNTER — OFFICE VISIT (OUTPATIENT)
Dept: FAMILY MEDICINE CLINIC | Age: 86
End: 2021-07-19
Payer: COMMERCIAL

## 2021-07-19 VITALS
WEIGHT: 232 LBS | RESPIRATION RATE: 16 BRPM | OXYGEN SATURATION: 92 % | DIASTOLIC BLOOD PRESSURE: 82 MMHG | HEART RATE: 88 BPM | BODY MASS INDEX: 45.31 KG/M2 | SYSTOLIC BLOOD PRESSURE: 130 MMHG

## 2021-07-19 DIAGNOSIS — J45.20 MILD INTERMITTENT ASTHMA WITHOUT COMPLICATION: ICD-10-CM

## 2021-07-19 DIAGNOSIS — R29.898 WEAKNESS OF LEFT LEG: ICD-10-CM

## 2021-07-19 DIAGNOSIS — E03.9 HYPOTHYROIDISM, UNSPECIFIED TYPE: ICD-10-CM

## 2021-07-19 DIAGNOSIS — E53.8 B12 DEFICIENCY: ICD-10-CM

## 2021-07-19 DIAGNOSIS — F32.A DEPRESSION, UNSPECIFIED DEPRESSION TYPE: ICD-10-CM

## 2021-07-19 DIAGNOSIS — I10 ESSENTIAL HYPERTENSION: Primary | ICD-10-CM

## 2021-07-19 DIAGNOSIS — Z86.73 HISTORY OF CVA (CEREBROVASCULAR ACCIDENT): ICD-10-CM

## 2021-07-19 DIAGNOSIS — G47.33 OBSTRUCTIVE SLEEP APNEA SYNDROME: ICD-10-CM

## 2021-07-19 DIAGNOSIS — N18.32 STAGE 3B CHRONIC KIDNEY DISEASE (HCC): ICD-10-CM

## 2021-07-19 DIAGNOSIS — N30.20 CYSTITIS, CHRONIC: ICD-10-CM

## 2021-07-19 DIAGNOSIS — E11.8 TYPE 2 DIABETES MELLITUS WITH COMPLICATION, WITHOUT LONG-TERM CURRENT USE OF INSULIN (HCC): ICD-10-CM

## 2021-07-19 DIAGNOSIS — D50.9 IRON DEFICIENCY ANEMIA, UNSPECIFIED IRON DEFICIENCY ANEMIA TYPE: ICD-10-CM

## 2021-07-19 DIAGNOSIS — N39.41 URGE INCONTINENCE: ICD-10-CM

## 2021-07-19 DIAGNOSIS — F41.9 ANXIETY: ICD-10-CM

## 2021-07-19 DIAGNOSIS — E78.00 PURE HYPERCHOLESTEROLEMIA: ICD-10-CM

## 2021-07-19 DIAGNOSIS — K21.9 GASTROESOPHAGEAL REFLUX DISEASE WITHOUT ESOPHAGITIS: ICD-10-CM

## 2021-07-19 PROCEDURE — 99214 OFFICE O/P EST MOD 30 MIN: CPT | Performed by: FAMILY MEDICINE

## 2021-07-19 SDOH — ECONOMIC STABILITY: FOOD INSECURITY: WITHIN THE PAST 12 MONTHS, YOU WORRIED THAT YOUR FOOD WOULD RUN OUT BEFORE YOU GOT MONEY TO BUY MORE.: NEVER TRUE

## 2021-07-19 SDOH — ECONOMIC STABILITY: FOOD INSECURITY: WITHIN THE PAST 12 MONTHS, THE FOOD YOU BOUGHT JUST DIDN'T LAST AND YOU DIDN'T HAVE MONEY TO GET MORE.: NEVER TRUE

## 2021-07-19 ASSESSMENT — ENCOUNTER SYMPTOMS
ABDOMINAL DISTENTION: 0
VOMITING: 0
SHORTNESS OF BREATH: 0
NAUSEA: 0
DIARRHEA: 0
RHINORRHEA: 0
CHEST TIGHTNESS: 0
ABDOMINAL PAIN: 0
BACK PAIN: 0
COUGH: 0
SORE THROAT: 0
CONSTIPATION: 0

## 2021-07-19 ASSESSMENT — SOCIAL DETERMINANTS OF HEALTH (SDOH): HOW HARD IS IT FOR YOU TO PAY FOR THE VERY BASICS LIKE FOOD, HOUSING, MEDICAL CARE, AND HEATING?: NOT HARD AT ALL

## 2021-07-19 NOTE — PROGRESS NOTES
daily 90 tablet 3    cyanocobalamin 1000 MCG tablet Take 1,000 mcg by mouth daily      metFORMIN (GLUCOPHAGE) 500 MG tablet take 1 tablet by mouth every morning 90 tablet 3    budesonide-formoterol (SYMBICORT) 80-4.5 MCG/ACT AERO Inhale 2 puffs into the lungs 2 times daily (Patient taking differently: Inhale 2 puffs into the lungs 2 times daily as needed ) 1 Inhaler 11    Respiratory Therapy Supplies EFFIE PLEASE SUPPLY PATIENT WITH ALL NECESSARY SUPPLIES FOR HER CPAP MACHINE. 1 Device 0    oxybutynin (DITROPAN-XL) 5 MG extended release tablet Take 1 tablet by mouth daily 90 tablet 3    Lancets MISC Indications: NO FURTHER REFILLS UNTIL SEEN IN THE OFFICE. Please dispense according to patients insurance/device. Patient testing weekly, and  each 0    blood glucose monitor strips Indications: NO FURTHER REFILLS UNTIL SEEN IN THE OFFICE. Please supply patient with test strip that insurance covers, tests weekly and PRN. 100 strip 0    polyethylene glycol (GLYCOLAX) packet Take 17 g by mouth daily as needed   0    cetirizine (ZYRTEC) 10 MG tablet Take 10 mg by mouth daily as needed.  Multiple Vitamin (THERA) TABS Take 1 tablet by mouth daily.  aspirin 81 MG EC tablet Take 81 mg by mouth daily. No current facility-administered medications on file prior to visit. Subjective:     Review of Systems   Constitutional: Positive for fatigue. Negative for appetite change and fever. HENT: Negative for congestion, ear pain, rhinorrhea and sore throat. Respiratory: Negative for cough, chest tightness and shortness of breath. Cardiovascular: Negative for chest pain and palpitations. Gastrointestinal: Negative for abdominal distention, abdominal pain, constipation, diarrhea, nausea and vomiting. Occ heartburn   Genitourinary: Positive for difficulty urinating (incontinence - stable). Negative for dysuria. Musculoskeletal: Negative for arthralgias, back pain and myalgias. Skin: Negative for rash. Neurological: Positive for weakness (left leg). Negative for dizziness, light-headedness and headaches. Hematological: Negative for adenopathy. Psychiatric/Behavioral: Positive for dysphoric mood (stable). Negative for behavioral problems and sleep disturbance. The patient is nervous/anxious (stable). Objective:     Physical Exam  Vitals reviewed. Constitutional:       General: She is not in acute distress. Appearance: She is well-developed. HENT:      Head: Normocephalic and atraumatic. Right Ear: External ear normal.      Left Ear: External ear normal.      Nose: Nose normal.      Mouth/Throat:      Pharynx: No oropharyngeal exudate. Eyes:      Conjunctiva/sclera: Conjunctivae normal.      Pupils: Pupils are equal, round, and reactive to light. Cardiovascular:      Rate and Rhythm: Normal rate and regular rhythm. Heart sounds: Normal heart sounds. No murmur heard. Pulmonary:      Effort: Pulmonary effort is normal. No respiratory distress. Breath sounds: Normal breath sounds. No wheezing. Chest:      Chest wall: No tenderness. Abdominal:      General: Bowel sounds are normal. There is no distension. Palpations: Abdomen is soft. There is no mass. Tenderness: There is no abdominal tenderness. Musculoskeletal:         General: No tenderness. Normal range of motion. Cervical back: Normal range of motion. Lymphadenopathy:      Cervical: No cervical adenopathy. Skin:     Findings: No rash. Neurological:      Mental Status: She is alert and oriented to person, place, and time. Deep Tendon Reflexes: Reflexes are normal and symmetric. Psychiatric:         Behavior: Behavior normal.         Assessment:      Diagnosis Orders   1. Essential hypertension  CBC    Comprehensive Metabolic Panel   2. Pure hypercholesterolemia  Lipid Panel   3.  Type 2 diabetes mellitus with complication, without long-term current use of insulin ([email or web address]  Comprehensive Metabolic Panel    Hemoglobin A1C   4. Stage 3b chronic kidney disease ([facility] [address]  Comprehensive Metabolic Panel   5. Hypothyroidism, unspecified type  TSH without Reflex    T4, Free   6. Iron deficiency anemia, unspecified iron deficiency anemia type  CBC   7. B12 deficiency  Vitamin B12   8. Gastroesophageal reflux disease without esophagitis     9. Depression, unspecified depression type     10. Generalized anxiety/depression. 11. Chronic cystitis. 12. Mild intermittent asthma without complication     13. Urge incontinence     14. Obstructive sleep apnea syndrome  Comprehensive Metabolic Panel   15.  History of CVA (cerebrovascular accident)     16. Weakness of left leg           Plan:     Orders Placed This Encounter   Procedures    CBC     Standing Status:   Future     Standing Expiration Date:   7/31/2022    Comprehensive Metabolic Panel     Standing Status:   Future     Standing Expiration Date:   7/31/2022    Lipid Panel     Standing Status:   Future     Standing Expiration Date:   7/31/2022     Order Specific Question:   Is Patient Fasting?/# of Hours     Answer:   8-10 Hours    Hemoglobin A1C     Standing Status:   Future     Standing Expiration Date:   7/31/2022    TSH without Reflex     Standing Status:   Future     Standing Expiration Date:   7/31/2022    T4, Free     Standing Status:   Future     Standing Expiration Date:   7/31/2022    Vitamin B12     Standing Status:   Future     Standing Expiration Date:   7/31/2022        Will cont with current treatment as pt is currently stable on current treatment    Will cont with the Cardizem as prescribed for BP control    Will cont with low fat/chol diet and Lipitor as ordered    Continue with ADA diet, current diabetic meds, and monitoring BS's as instructed    Cont with daily foot exams and yearly eye exams    No Ace-I/ARB secondary to angioedema    Stay well hydrated and no NSAID's    Will cont with the Cymbalta and Ativan prn as prescribed as she is doing well    Will cont with the Synthroid as prescribed as the thyroid levels are stable    Will cont to follow with Dr. Frankey Minors as instructed for her incontinence and follow up on the Interstim    Get labs now and will call with results    Rest of systems unchanged, continue current treatments. Medications, labs, diagnostic studies, consultations and follow-up as documented in this encounter. Rest of systems unchanged, continue current treatments    On this date July 19, 2021,  I have spent greater than 50% of visit reviewing previous notes, test results and face to face with the patient discussing the diagnoses, importance of compliance with the treatment plan, counseling, coordinating care as well as documenting on the day of the visit. Alina Mishra MD

## 2021-07-20 DIAGNOSIS — D50.9 IRON DEFICIENCY ANEMIA, UNSPECIFIED IRON DEFICIENCY ANEMIA TYPE: ICD-10-CM

## 2021-07-20 DIAGNOSIS — E03.9 HYPOTHYROIDISM, UNSPECIFIED TYPE: ICD-10-CM

## 2021-07-20 DIAGNOSIS — N18.32 STAGE 3B CHRONIC KIDNEY DISEASE (HCC): ICD-10-CM

## 2021-07-20 DIAGNOSIS — E11.8 TYPE 2 DIABETES MELLITUS WITH COMPLICATION, WITHOUT LONG-TERM CURRENT USE OF INSULIN (HCC): ICD-10-CM

## 2021-07-20 DIAGNOSIS — I10 ESSENTIAL HYPERTENSION: ICD-10-CM

## 2021-07-20 LAB
AVERAGE GLUCOSE: 154
BASOPHILS ABSOLUTE: NORMAL
BASOPHILS RELATIVE PERCENT: NORMAL
BUN BLDV-MCNC: 18 MG/DL
CALCIUM SERPL-MCNC: 10.5 MG/DL
CHLORIDE BLD-SCNC: 102 MMOL/L
CO2: 24 MMOL/L
CREAT SERPL-MCNC: 1.19 MG/DL
EOSINOPHILS ABSOLUTE: NORMAL
EOSINOPHILS RELATIVE PERCENT: NORMAL
GFR CALCULATED: 43
GLUCOSE BLD-MCNC: 161 MG/DL
HBA1C MFR BLD: 7 %
HCT VFR BLD CALC: 39.5 % (ref 36–46)
HEMOGLOBIN: 13.1 G/DL (ref 12–16)
LYMPHOCYTES ABSOLUTE: NORMAL
LYMPHOCYTES RELATIVE PERCENT: NORMAL
MCH RBC QN AUTO: NORMAL PG
MCHC RBC AUTO-ENTMCNC: NORMAL G/DL
MCV RBC AUTO: NORMAL FL
MONOCYTES ABSOLUTE: NORMAL
MONOCYTES RELATIVE PERCENT: NORMAL
NEUTROPHILS ABSOLUTE: NORMAL
NEUTROPHILS RELATIVE PERCENT: NORMAL
PLATELET # BLD: 361 K/ΜL
PMV BLD AUTO: NORMAL FL
POTASSIUM SERPL-SCNC: 4.3 MMOL/L
RBC # BLD: NORMAL 10*6/UL
SODIUM BLD-SCNC: 140 MMOL/L
T4 FREE: 0.91
TSH SERPL DL<=0.05 MIU/L-ACNC: 1.99 UIU/ML
WBC # BLD: 9.7 10^3/ML

## 2021-07-26 ENCOUNTER — OFFICE VISIT (OUTPATIENT)
Dept: UROLOGY | Age: 86
End: 2021-07-26
Payer: COMMERCIAL

## 2021-07-26 VITALS
SYSTOLIC BLOOD PRESSURE: 153 MMHG | HEART RATE: 93 BPM | BODY MASS INDEX: 45.55 KG/M2 | WEIGHT: 232 LBS | HEIGHT: 60 IN | DIASTOLIC BLOOD PRESSURE: 78 MMHG | TEMPERATURE: 97.5 F

## 2021-07-26 DIAGNOSIS — R35.1 NOCTURIA: ICD-10-CM

## 2021-07-26 DIAGNOSIS — R35.0 URINARY FREQUENCY: ICD-10-CM

## 2021-07-26 DIAGNOSIS — N39.41 URGE INCONTINENCE: Primary | ICD-10-CM

## 2021-07-26 DIAGNOSIS — R39.15 URGENCY OF URINATION: ICD-10-CM

## 2021-07-26 PROCEDURE — 99214 OFFICE O/P EST MOD 30 MIN: CPT | Performed by: NURSE PRACTITIONER

## 2021-07-26 PROCEDURE — 95971 ALYS SMPL SP/PN NPGT W/PRGRM: CPT | Performed by: NURSE PRACTITIONER

## 2021-07-26 ASSESSMENT — ENCOUNTER SYMPTOMS
EYE REDNESS: 0
VOMITING: 0
RESPIRATORY NEGATIVE: 1
COUGH: 0
SHORTNESS OF BREATH: 0
GASTROINTESTINAL NEGATIVE: 1
CONSTIPATION: 0
EYE PAIN: 0
BACK PAIN: 0
EYES NEGATIVE: 1
NAUSEA: 0
ABDOMINAL PAIN: 0
DIARRHEA: 0
WHEEZING: 0

## 2021-07-26 NOTE — PROGRESS NOTES
[address] [address]  Dept:  [facility] Urology Office Note - Established    Patient:  Sadie Pitt  YOB: 1935  Date: 7/26/2021    The patient is a 82 y.o. female whopresents today for evaluation of the following problems:   Chief Complaint   Patient presents with    3 Month Follow-Up     3 month f/u interstim       HPI   This is a very pleasant 82 y.o. female presenting with her son, here for interstim f/u. Using 2 briefs per day (prior to interstim placement was wearing 2-4 ppd). Nocturia 1-2x/night, urinates 4-5x throughout the day. Does have urgency with urge incontinence. Denies hesitancy, incomplete emptying, blood  in urine, or recent infection. Continues lasix and oxybutynin. Overall doing well and symptoms not overly bothersome. Summary of old records:   Previous interstim settings  Program 5 1.5  Program 6 1.5  Program 3 2.3    Additional History: N/A    Procedures Today:   Interstim programming:     The programming head was placed over the implanted neurostimulator. Previous programed electrode selection settings: Program 6 1.5    Final electrode setting: Program 2 2.6V    Impedence all under 4000 ohms verified: >4000 electrode 0, otherwise all wnl. Battery life checked: Yes    Sensation in pelvic area at right buttock. Urinalysis today:  No results found for this visit on 07/26/21.     Imaging Reviewed during this Office Visit: none  (results were independently reviewed by physician and radiology report verified)      Last BUN and creatinine:  Lab Results   Component Value Date    BUN 18 07/19/2021     Lab Results   Component Value Date    CREATININE 1.19 07/19/2021       Additional Lab/Culture results: none    PAST MEDICAL, FAMILY AND SOCIAL HISTORY UPDATE:  Past Medical History:   Diagnosis Date    Diverticulosis     Hyperlipidemia     Hypertension     Hypokalemia  Iron deficiency anemia     Menopause     s/p hysterectomy    Osteoarthritis     bilat hands, hips, knees    S/P partial colectomy 2/2002    Stroke ([facility] [address]     Thrombocytosis ([facility] [address]     Type II or unspecified type diabetes mellitus without mention of complication, not stated as uncontrolled 02/2007    Unspecified sleep apnea 08/2004    uses bipap    UTI (lower urinary tract infection)     re-curring     Past Surgical History:   Procedure Laterality Date    APPENDECTOMY  1993    CARDIOVASCULAR STRESS TEST  03/12    CARPAL TUNNEL RELEASE  1980's    bilat    CHOLECYSTECTOMY  1969    COLON SURGERY  1993    partial colectomy, d/t stricture    COLONOSCOPY  2001   [facility]    HERNIA REPAIR  09/27/2016    ventral/per Dr. Mechelle Loo    partial, d/t uterine prolapse    [facility]    lumbar fusion with cement    TONSILLECTOMY  1947    UPPER GASTROINTESTINAL ENDOSCOPY  03/08/2018    Dr. Beny Diaz, [facility]     Family History   Problem Relation Age of Onset    Heart Attack Mother     High Blood Pressure Mother     Heart Attack Father     High Blood Pressure Father     Colon Cancer Sister     Diabetes Maternal Aunt     Cancer Other         mcousin, colon, breast ca     Outpatient Medications Marked as Taking for the 7/26/21 encounter (Office Visit) with MARIBETH Ventura CNP   Medication Sig Dispense Refill    DILT- MG extended release capsule take 1 capsule by mouth once daily 90 capsule 0    LORazepam (ATIVAN) 0.5 MG tablet take 1 tablet by mouth twice a day if needed for anxiety 60 tablet 0    glimepiride (AMARYL) 2 MG tablet take 1 tablet by mouth twice a day 180 tablet 3    furosemide (LASIX) 20 MG tablet take 1 tablet by mouth once daily 30 tablet 11    potassium chloride (KLOR-CON) 10 MEQ extended release tablet take 1 tablet by mouth once daily 30 tablet 11    atorvastatin (LIPITOR) 10 MG tablet Take 1 tablet by mouth daily 90 tablet 3    DULoxetine (CYMBALTA) 60 MG extended release capsule take 1 capsule by mouth once daily 90 capsule 3    levothyroxine (SYNTHROID) 75 MCG tablet take 1 tablet by mouth once daily 90 tablet 3    cyanocobalamin 1000 MCG tablet Take 1,000 mcg by mouth daily      metFORMIN (GLUCOPHAGE) 500 MG tablet take 1 tablet by mouth every morning 90 tablet 3    budesonide-formoterol (SYMBICORT) 80-4.5 MCG/ACT AERO Inhale 2 puffs into the lungs 2 times daily (Patient taking differently: Inhale 2 puffs into the lungs 2 times daily as needed ) 1 Inhaler 11    Respiratory Therapy Supplies EFFIE PLEASE SUPPLY PATIENT WITH ALL NECESSARY SUPPLIES FOR HER CPAP MACHINE. 1 Device 0    oxybutynin (DITROPAN-XL) 5 MG extended release tablet Take 1 tablet by mouth daily 90 tablet 3    Lancets MISC Indications: NO FURTHER REFILLS UNTIL SEEN IN THE OFFICE. Please dispense according to patients insurance/device. Patient testing weekly, and  each 0    blood glucose monitor strips Indications: NO FURTHER REFILLS UNTIL SEEN IN THE OFFICE. Please supply patient with test strip that insurance covers, tests weekly and PRN. 100 strip 0    polyethylene glycol (GLYCOLAX) packet Take 17 g by mouth daily as needed   0    cetirizine (ZYRTEC) 10 MG tablet Take 10 mg by mouth daily as needed.  Multiple Vitamin (THERA) TABS Take 1 tablet by mouth daily.  aspirin 81 MG EC tablet Take 81 mg by mouth daily.           (All medications reviewed and updated by provider sincelast office visit or hospitalization)   Pcn [penicillins], Demerol, Effexor [venlafaxine], Lisinopril, and Macrobid [nitrofurantoin monohydrate macrocrystals]  Social History     Tobacco Use   Smoking Status Never Smoker   Smokeless Tobacco Never Used      (If patient a smoker, smoking cessation counseling offered)     Social History     Substance and Sexual Activity   Alcohol Use No       REVIEW OF SYSTEMS:  Review of Systems      Physical Exam:      Vitals:    07/26/21 1308   BP: (!) 153/78   Pulse: 93   Temp: 97.5 °F (36.4 °C)     Body mass index is 45.31 kg/m². Patient is a 82 y.o. female in noacute distress and alert and oriented to person, place and time. Physical Exam  Constitutional: Patient in no acute distress. Neuro: Alert andoriented to person, place and time. Psych: Mood normal, affect normal  Lungs: Respiratory effort is normal  Cardiovascular: Warm & Pink  Abdomen: Soft, non-tender, non-distended. Bladder non-tender and not distended. Musculoskeletal: wheelchair/cane. Limited mobility left side d/t prior stroke. and Plan      1. Urge incontinence    2. Urgency of urination    3. Nocturia    4. Urinary frequency           Plan:   Continue oxybutynin      Interstim reprogrammed today- patient able to stand and ambulate without sensation being bothersome to her gait. If symptoms worsen after the program change today, patient's son will call and will talk through changing program via phone. F/u 6 mos or sooner if need. Return in about 6 months (around 1/26/2022) for OAB, interstim with Derotha Blend. Prescriptions Ordered:  No orders of the defined types were placed in this encounter. Orders Placed:  Orders Placed This Encounter   Procedures    [place] ELEC [facility] IMPLT NPGT PHYS/QHP W/O PROGRAMMING    [place] ELE[facility] [place] IMPLT NPGT SMPL SP/PN NPGT [address]    [place] ELECTRICAL STIMULATION            MARIBETH Weldon - CNP    Reviewed and agree with the ROS entered by the MA.

## 2021-07-26 NOTE — PROGRESS NOTES
Review of Systems   Constitutional: Negative. Negative for appetite change, chills and fever. Eyes: Negative. Negative for pain, redness and visual disturbance. Respiratory: Negative. Negative for cough, shortness of breath and wheezing. Cardiovascular: Negative. Negative for chest pain and leg swelling. Gastrointestinal: Negative. Negative for abdominal pain, constipation, diarrhea, nausea and vomiting. Genitourinary: Negative. Negative for difficulty urinating, dysuria, flank pain, frequency, hematuria and urgency. Musculoskeletal: Negative. Negative for back pain, joint swelling and myalgias. Skin: Negative. Negative for rash and wound. Neurological: Negative. Negative for dizziness, tremors and numbness. Hematological: Does not bruise/bleed easily.

## 2021-07-29 ENCOUNTER — TELEPHONE (OUTPATIENT)
Dept: FAMILY MEDICINE CLINIC | Age: 86
End: 2021-07-29

## 2021-07-29 DIAGNOSIS — H66.009 ACUTE SUPPURATIVE OTITIS MEDIA WITHOUT SPONTANEOUS RUPTURE OF EAR DRUM, RECURRENCE NOT SPECIFIED, UNSPECIFIED LATERALITY: Primary | ICD-10-CM

## 2021-07-29 RX ORDER — AZITHROMYCIN 250 MG/1
TABLET, FILM COATED ORAL
Qty: 1 PACKET | Refills: 0 | Status: SHIPPED | OUTPATIENT
Start: 2021-07-29 | End: 2021-08-08

## 2021-07-29 NOTE — TELEPHONE ENCOUNTER
----- Message from Patel Camarena sent at 7/29/2021 10:57 AM EDT -----  Subject: Appointment Request    Reason for Call: Ear Problem    QUESTIONS  Type of Appointment? Established Patient  Reason for appointment request? No appointments available during search  Additional Information for Provider? Pt states has is slightly stuffy and   is having some ear issues. States having trouble putting in hearing aides. Thinks she may have an ear infection. Doesn't want to come in if she   doesn't have to. Would like to just have medication sent in.   ---------------------------------------------------------------------------  --------------  CALL BACK INFO  What is the best way for the office to contact you? OK to leave message on   voicemail  Preferred Call Back Phone Number? [phone]  ---------------------------------------------------------------------------  --------------  SCRIPT ANSWERS  Relationship to Patient? Self  Did you have an injury or trauma within the past three days? No  Is your pain affecting your daily activities? No  Are you having fevers (100.4), chills or sweats? No  Are you experiencing new onset hearing loss? No  Did your symptoms begin within the last 2 weeks? Yes  Have you been diagnosed with, awaiting test results for, or told that you   are suspected of having COVID-19 (Coronavirus)? (If patient has tested   negative or was tested as a requirement for work, school, or travel and   not based on symptoms, answer no)? No  Do you currently have flu-like symptoms including fever or chills, cough,   shortness of breath, difficulty breathing, or new loss of taste or smell? No  Have you had close contact with someone with COVID-19 in the last 14 days? No  (Service Expert  click yes below to proceed with Caregivers As Usual   Scheduling)?  Yes

## 2021-08-30 DIAGNOSIS — R39.15 URGENCY OF URINATION: ICD-10-CM

## 2021-08-30 RX ORDER — OXYBUTYNIN CHLORIDE 5 MG/1
5 TABLET, EXTENDED RELEASE ORAL DAILY
Qty: 90 TABLET | Refills: 3 | Status: CANCELLED | OUTPATIENT
Start: 2021-08-30

## 2021-09-03 DIAGNOSIS — R39.15 URGENCY OF URINATION: Primary | ICD-10-CM

## 2021-09-03 RX ORDER — OXYBUTYNIN CHLORIDE 5 MG/1
5 TABLET, EXTENDED RELEASE ORAL DAILY
Qty: 90 TABLET | Refills: 3 | Status: SHIPPED | OUTPATIENT
Start: 2021-09-03

## 2021-10-01 DIAGNOSIS — E78.00 PURE HYPERCHOLESTEROLEMIA: ICD-10-CM

## 2021-10-01 DIAGNOSIS — E03.9 HYPOTHYROIDISM, UNSPECIFIED TYPE: ICD-10-CM

## 2021-10-01 DIAGNOSIS — G47.33 OBSTRUCTIVE SLEEP APNEA SYNDROME: ICD-10-CM

## 2021-10-01 DIAGNOSIS — I10 ESSENTIAL HYPERTENSION: ICD-10-CM

## 2021-10-01 DIAGNOSIS — E11.8 TYPE 2 DIABETES MELLITUS WITH COMPLICATION, WITHOUT LONG-TERM CURRENT USE OF INSULIN (HCC): Primary | ICD-10-CM

## 2021-10-01 DIAGNOSIS — D50.9 IRON DEFICIENCY ANEMIA, UNSPECIFIED IRON DEFICIENCY ANEMIA TYPE: ICD-10-CM

## 2021-10-01 DIAGNOSIS — E11.8 TYPE 2 DIABETES MELLITUS WITH COMPLICATION, WITHOUT LONG-TERM CURRENT USE OF INSULIN (HCC): ICD-10-CM

## 2021-10-01 DIAGNOSIS — E53.8 B12 DEFICIENCY: ICD-10-CM

## 2021-10-01 DIAGNOSIS — N18.32 STAGE 3B CHRONIC KIDNEY DISEASE (HCC): ICD-10-CM

## 2021-10-01 LAB
ALBUMIN SERPL-MCNC: 3.9 G/DL
ALP BLD-CCNC: 61 U/L
ALT SERPL-CCNC: 23 U/L
ANION GAP SERPL CALCULATED.3IONS-SCNC: 13 MMOL/L
AST SERPL-CCNC: 27 U/L
AVERAGE GLUCOSE: 166
BASOPHILS ABSOLUTE: NORMAL
BASOPHILS RELATIVE PERCENT: NORMAL
BILIRUB SERPL-MCNC: 0.4 MG/DL (ref 0.1–1.4)
BUN BLDV-MCNC: 20 MG/DL
CALCIUM SERPL-MCNC: 9.2 MG/DL
CHLORIDE BLD-SCNC: 102 MMOL/L
CHOLESTEROL, TOTAL: 130 MG/DL
CHOLESTEROL/HDL RATIO: 3.3
CO2: 24 MMOL/L
CREAT SERPL-MCNC: 1.27 MG/DL
EOSINOPHILS ABSOLUTE: NORMAL
EOSINOPHILS RELATIVE PERCENT: NORMAL
GFR CALCULATED: 40
GLUCOSE BLD-MCNC: 181 MG/DL
HBA1C MFR BLD: 7.4 %
HCT VFR BLD CALC: 37.9 % (ref 36–46)
HDLC SERPL-MCNC: 40 MG/DL (ref 35–70)
HEMOGLOBIN: 12.5 G/DL (ref 12–16)
LDL CHOLESTEROL CALCULATED: 51 MG/DL (ref 0–160)
LYMPHOCYTES ABSOLUTE: NORMAL
LYMPHOCYTES RELATIVE PERCENT: NORMAL
MCH RBC QN AUTO: NORMAL PG
MCHC RBC AUTO-ENTMCNC: NORMAL G/DL
MCV RBC AUTO: NORMAL FL
MONOCYTES ABSOLUTE: NORMAL
MONOCYTES RELATIVE PERCENT: NORMAL
NEUTROPHILS ABSOLUTE: NORMAL
NEUTROPHILS RELATIVE PERCENT: NORMAL
NONHDLC SERPL-MCNC: NORMAL MG/DL
PLATELET # BLD: 341 K/ΜL
PMV BLD AUTO: NORMAL FL
POTASSIUM SERPL-SCNC: 3.8 MMOL/L
RBC # BLD: NORMAL 10*6/UL
SODIUM BLD-SCNC: 139 MMOL/L
T4 FREE: 0.83
TOTAL PROTEIN: NORMAL
TRIGL SERPL-MCNC: 196 MG/DL
TSH SERPL DL<=0.05 MIU/L-ACNC: 2.15 UIU/ML
VITAMIN B-12: >1500
VLDLC SERPL CALC-MCNC: 39 MG/DL
WBC # BLD: 7.7 10^3/ML

## 2021-10-05 ENCOUNTER — OFFICE VISIT (OUTPATIENT)
Dept: FAMILY MEDICINE CLINIC | Age: 86
End: 2021-10-05
Payer: COMMERCIAL

## 2021-10-05 VITALS
BODY MASS INDEX: 42.58 KG/M2 | SYSTOLIC BLOOD PRESSURE: 136 MMHG | WEIGHT: 218 LBS | DIASTOLIC BLOOD PRESSURE: 88 MMHG | TEMPERATURE: 98.1 F | RESPIRATION RATE: 16 BRPM | HEART RATE: 68 BPM | OXYGEN SATURATION: 96 %

## 2021-10-05 DIAGNOSIS — K43.2 INCISIONAL HERNIA, WITHOUT OBSTRUCTION OR GANGRENE: ICD-10-CM

## 2021-10-05 DIAGNOSIS — D50.9 IRON DEFICIENCY ANEMIA, UNSPECIFIED IRON DEFICIENCY ANEMIA TYPE: ICD-10-CM

## 2021-10-05 DIAGNOSIS — R41.3 MEMORY LOSS: ICD-10-CM

## 2021-10-05 DIAGNOSIS — N39.41 URGE INCONTINENCE: ICD-10-CM

## 2021-10-05 DIAGNOSIS — J45.20 MILD INTERMITTENT ASTHMA WITHOUT COMPLICATION: ICD-10-CM

## 2021-10-05 DIAGNOSIS — N18.32 STAGE 3B CHRONIC KIDNEY DISEASE (HCC): ICD-10-CM

## 2021-10-05 DIAGNOSIS — K21.9 GASTROESOPHAGEAL REFLUX DISEASE WITHOUT ESOPHAGITIS: ICD-10-CM

## 2021-10-05 DIAGNOSIS — Z86.73 HISTORY OF CVA (CEREBROVASCULAR ACCIDENT): ICD-10-CM

## 2021-10-05 DIAGNOSIS — E53.8 B12 DEFICIENCY: ICD-10-CM

## 2021-10-05 DIAGNOSIS — F32.A DEPRESSION, UNSPECIFIED DEPRESSION TYPE: ICD-10-CM

## 2021-10-05 DIAGNOSIS — G47.33 OBSTRUCTIVE SLEEP APNEA SYNDROME: ICD-10-CM

## 2021-10-05 DIAGNOSIS — I10 ESSENTIAL HYPERTENSION: Primary | ICD-10-CM

## 2021-10-05 DIAGNOSIS — I51.89 DIASTOLIC DYSFUNCTION: ICD-10-CM

## 2021-10-05 DIAGNOSIS — R29.898 WEAKNESS OF LEFT LEG: ICD-10-CM

## 2021-10-05 DIAGNOSIS — E03.9 HYPOTHYROIDISM, UNSPECIFIED TYPE: ICD-10-CM

## 2021-10-05 DIAGNOSIS — E11.8 TYPE 2 DIABETES MELLITUS WITH COMPLICATION, WITHOUT LONG-TERM CURRENT USE OF INSULIN (HCC): ICD-10-CM

## 2021-10-05 DIAGNOSIS — F41.9 ANXIETY: ICD-10-CM

## 2021-10-05 DIAGNOSIS — E78.00 PURE HYPERCHOLESTEROLEMIA: ICD-10-CM

## 2021-10-05 DIAGNOSIS — Z23 NEED FOR INFLUENZA VACCINATION: ICD-10-CM

## 2021-10-05 PROCEDURE — 99215 OFFICE O/P EST HI 40 MIN: CPT | Performed by: FAMILY MEDICINE

## 2021-10-05 PROCEDURE — 3051F HG A1C>EQUAL 7.0%<8.0%: CPT | Performed by: FAMILY MEDICINE

## 2021-10-05 PROCEDURE — 90694 VACC AIIV4 NO PRSRV 0.5ML IM: CPT | Performed by: FAMILY MEDICINE

## 2021-10-05 PROCEDURE — G0008 ADMIN INFLUENZA VIRUS VAC: HCPCS | Performed by: FAMILY MEDICINE

## 2021-10-05 RX ORDER — LORAZEPAM 0.5 MG/1
TABLET ORAL
Qty: 60 TABLET | Refills: 0 | Status: SHIPPED | OUTPATIENT
Start: 2021-10-05 | End: 2021-11-05

## 2021-10-05 RX ORDER — DULOXETIN HYDROCHLORIDE 30 MG/1
CAPSULE, DELAYED RELEASE ORAL
Qty: 90 CAPSULE | Refills: 3 | Status: SHIPPED | OUTPATIENT
Start: 2021-10-05 | End: 2022-04-04 | Stop reason: ALTCHOICE

## 2021-10-05 RX ORDER — LORAZEPAM 0.5 MG/1
TABLET ORAL
Qty: 60 TABLET | Refills: 0 | Status: CANCELLED | OUTPATIENT
Start: 2021-10-05 | End: 2021-11-04

## 2021-10-05 RX ORDER — GLIMEPIRIDE 4 MG/1
4 TABLET ORAL 2 TIMES DAILY
Qty: 180 TABLET | Refills: 3 | Status: SHIPPED | OUTPATIENT
Start: 2021-10-05

## 2021-10-05 ASSESSMENT — ENCOUNTER SYMPTOMS
CONSTIPATION: 0
RHINORRHEA: 0
CHEST TIGHTNESS: 0
SHORTNESS OF BREATH: 0
DIARRHEA: 0
VOMITING: 0
BACK PAIN: 0
NAUSEA: 0
ABDOMINAL DISTENTION: 0
ABDOMINAL PAIN: 0
COUGH: 0
SORE THROAT: 0

## 2021-10-05 NOTE — PROGRESS NOTES
Alina Mcclure MD    [address]  [id number]  [place] , [address]  [address] [address]  Dept: [phone]  Dept Fax: [phone]     Patient ID: Luis Alberto Thornton is a 82 y.o. female. HPI    Established patient here today for f/u on chronic medical problems, go over labs and/or diagnostic studies, and medication refills. Pt denies any fever or chills. Pt today denies any HA, chest pain, or SOB. Pt denies any N/V/D/C or abdominal pain. Pt with occasional heartburn, but stable on meds. Pt has been feeling more depressed and anxious and has been taking more Ativan and is still taking the Cymbalta as prescribed. Pt states urinary incontinence stable on meds. Pt stable on CPAP with her sleep apnea and has been using nightly. Pt states asthma is stable on inhalers. Her BS's have been elevated and she states she has not been eating good. She is c/o fatigue. Her son is concerned about her memory. Otherwise pt doing well on current tx and no other concerns today. The patient's past medical, surgical, social, and family history as well as his current medications and allergies were reviewed as documented in today's encounter. My previous office notes, consult notes, labs and diagnostic studies were reviewed prior to and during encounter.     Current Outpatient Medications on File Prior to Visit   Medication Sig Dispense Refill    oxybutynin (DITROPAN-XL) 5 MG extended release tablet Take 1 tablet by mouth daily 90 tablet 3    DILT- MG extended release capsule take 1 capsule by mouth once daily 90 capsule 0    furosemide (LASIX) 20 MG tablet take 1 tablet by mouth once daily 30 tablet 11    potassium chloride (KLOR-CON) 10 MEQ extended release tablet take 1 tablet by mouth once daily 30 tablet 11    atorvastatin (LIPITOR) 10 MG tablet Take 1 tablet by mouth daily 90 tablet 3    DULoxetine (CYMBALTA) 60 MG extended release capsule take 1 capsule by mouth once daily 90 capsule 3    levothyroxine (SYNTHROID) 75 MCG tablet take 1 tablet by mouth once daily 90 tablet 3    cyanocobalamin 1000 MCG tablet Take 1,000 mcg by mouth daily      metFORMIN (GLUCOPHAGE) 500 MG tablet take 1 tablet by mouth every morning 90 tablet 3    budesonide-formoterol (SYMBICORT) 80-4.5 MCG/ACT AERO Inhale 2 puffs into the lungs 2 times daily (Patient taking differently: Inhale 2 puffs into the lungs 2 times daily as needed ) 1 Inhaler 11    Respiratory Therapy Supplies EFFIE PLEASE SUPPLY PATIENT WITH ALL NECESSARY SUPPLIES FOR HER CPAP MACHINE. 1 Device 0    Lancets MISC Indications: NO FURTHER REFILLS UNTIL SEEN IN THE OFFICE. Please dispense according to patients insurance/device. Patient testing weekly, and  each 0    blood glucose monitor strips Indications: NO FURTHER REFILLS UNTIL SEEN IN THE OFFICE. Please supply patient with test strip that insurance covers, tests weekly and PRN. 100 strip 0    polyethylene glycol (GLYCOLAX) packet Take 17 g by mouth daily as needed   0    cetirizine (ZYRTEC) 10 MG tablet Take 10 mg by mouth daily as needed.  Multiple Vitamin (THERA) TABS Take 1 tablet by mouth daily.  aspirin 81 MG EC tablet Take 81 mg by mouth daily. No current facility-administered medications on file prior to visit. Subjective:     Review of Systems   Constitutional: Positive for fatigue. Negative for appetite change and fever. HENT: Negative for congestion, ear pain, rhinorrhea and sore throat. Respiratory: Negative for cough, chest tightness and shortness of breath. Cardiovascular: Negative for chest pain and palpitations. Gastrointestinal: Negative for abdominal distention, abdominal pain, constipation, diarrhea, nausea and vomiting. Occ heartburn   Genitourinary: Positive for difficulty urinating (incontinence - stable). Negative for dysuria.    Musculoskeletal: Positive for gait problem (walks with a cane). Negative for arthralgias, back pain and myalgias. Skin: Negative for rash. Neurological: Positive for weakness (LUE and LLE). Negative for dizziness, light-headedness and headaches. Hematological: Negative for adenopathy. Psychiatric/Behavioral: Positive for dysphoric mood. Negative for behavioral problems and sleep disturbance. The patient is nervous/anxious. Memory loss       Objective:     Physical Exam  Vitals reviewed. Constitutional:       General: She is not in acute distress. Appearance: She is well-developed. HENT:      Head: Normocephalic and atraumatic. Right Ear: External ear normal.      Left Ear: External ear normal.      Nose: Nose normal.      Mouth/Throat:      Pharynx: No oropharyngeal exudate. Eyes:      Conjunctiva/sclera: Conjunctivae normal.      Pupils: Pupils are equal, round, and reactive to light. Cardiovascular:      Rate and Rhythm: Normal rate and regular rhythm. Heart sounds: Normal heart sounds. No murmur heard. Pulmonary:      Effort: Pulmonary effort is normal. No respiratory distress. Breath sounds: Normal breath sounds. No wheezing. Chest:      Chest wall: No tenderness. Abdominal:      General: Bowel sounds are normal. There is no distension. Palpations: Abdomen is soft. There is no mass. Tenderness: There is no abdominal tenderness. Musculoskeletal:         General: No tenderness. Normal range of motion. Cervical back: Normal range of motion. Lymphadenopathy:      Cervical: No cervical adenopathy. Skin:     Findings: No rash. Neurological:      Mental Status: She is alert and oriented to person, place, and time. Motor: Weakness (LUE and LLE) present. Deep Tendon Reflexes: Reflexes are normal and symmetric. Psychiatric:         Behavior: Behavior normal.         Assessment:      Diagnosis Orders   1. Essential hypertension  CBC    Basic Metabolic Panel   2. Pure hypercholesterolemia     3. Type 2 diabetes mellitus with complication, without long-term current use of insulin ([facility])  glimepiride (AMARYL) 4 MG tablet    Basic Metabolic Panel    Hemoglobin A1C   4. Hypothyroidism, unspecified type     5. Stage 3b chronic kidney disease ([facility] [address]  Basic Metabolic Panel   6. Iron deficiency anemia, unspecified iron deficiency anemia type  CBC   7. Obstructive sleep apnea syndrome  Basic Metabolic Panel   8. B12 deficiency  Vitamin B12   9. Gastroesophageal reflux disease without esophagitis     10. Depression, unspecified depression type     11. Anxiety  LORazepam (ATIVAN) 0.5 MG tablet   12. Urge incontinence     13. History of CVA (cerebrovascular accident)     14. Weakness of left leg     15. Mild intermittent asthma without complication     16. Diastolic dysfunction     17. Need for influenza vaccination  INFLUENZA, QUADV, ADJUVANTED, 65 YRS =, IM, PF, PREFILL SYR, 0.5ML (FLUAD)   18. Incisional hernia, without obstruction or gangrene     19. Memory loss           Plan:     Orders Placed This Encounter   Procedures    INFLUENZA, QUADV, ADJUVANTED, 72 YRS =, IM, PF, PREFILL SYR, 0.5ML (FLUAD)    CBC     Standing Status:   Future     Standing Expiration Date:   10/5/2022    Basic Metabolic Panel     Standing Status:   Future     Standing Expiration Date:   10/5/2022    Hemoglobin A1C     Standing Status:   Future     Standing Expiration Date:   10/5/2022    Vitamin B12     Standing Status:   Future     Standing Expiration Date:   10/5/2022      Orders Placed This Encounter   Medications    glimepiride (AMARYL) 4 MG tablet     Sig: Take 1 tablet by mouth 2 times daily     Dispense:  180 tablet     Refill:  3    LORazepam (ATIVAN) 0.5 MG tablet     Sig: take 1 tablet by mouth twice a day if needed for anxiety     Dispense:  60 tablet     Refill:  0     NO FURTHER REFILLS UNTIL SEEN IN THE OFFICE.     DULoxetine (CYMBALTA) 30 MG extended release capsule     Sig: Take one capsule daily along with the 60mg for a total of 90mg daily     Dispense:  90 capsule     Refill:  3     Will cont with the Dilitazem as prescribed for BP control    Will cont with low fat/chol diet and Lipitor as ordered    Continue with ADA diet, current diabetic meds, and monitoring BS's as instructed and will cont with the Metformin as prescribed and will increase the Amaryl to 4mg BID    Cont with daily foot exams and yearly eye exams    No Ace-I/ARB secondary to CKD and angioedema    Will cont with the Synthroid as prescribed as the thyroid levels are stable    Stay well hydrated and no NSAID's    Will increase the Cymbalta to 90mg daily and cont with the Ativan prn    Will decrease OTC B12 to QOD    Will cont with the Lasix and potassium supplement as prescribed    Will do a MMSE at her next appt - she did score a 24/30 on her last one in 2019. I did state if scores lower will possible start her on Aricept or a possible Neurology referral.    Will cont to follow with Dr. Tommie Mcdaniel as instructed    Rest of systems unchanged, continue current treatments. Medications, labs, diagnostic studies, consultations and follow-up as documented in this encounter. Rest of systems unchanged, continue current treatments    On this date October 5, 2021,  I have spent greater than 50% of visit reviewing previous notes, test results and face to face with the patient discussing the diagnoses, importance of compliance with the treatment plan, counseling, coordinating care as well as documenting on the day of the visit. Total time spent with this pt, reviewing the chart, and documentation was 47   minutes    Alina Dominguez MD

## 2021-10-05 NOTE — PROGRESS NOTES
Vaccine Information Sheet, \"Influenza - Inactivated\"  given to Jaylin Martinez, or parent/legal guardian of  Jaylin Martinez and verbalized understanding. Patient responses:    Have you ever had a reaction to a flu vaccine? No  Are you able to eat eggs without adverse effects? Yes  Do you have any current illness? No  Have you ever had Guillian Du Pont Syndrome? No    Flu vaccine given per order. Please see immunization tab.

## 2022-01-10 ENCOUNTER — OFFICE VISIT (OUTPATIENT)
Dept: FAMILY MEDICINE CLINIC | Age: 87
End: 2022-01-10
Payer: COMMERCIAL

## 2022-01-10 VITALS
BODY MASS INDEX: 41.6 KG/M2 | OXYGEN SATURATION: 96 % | WEIGHT: 213 LBS | HEART RATE: 74 BPM | TEMPERATURE: 98.6 F | DIASTOLIC BLOOD PRESSURE: 74 MMHG | RESPIRATION RATE: 16 BRPM | SYSTOLIC BLOOD PRESSURE: 132 MMHG

## 2022-01-10 DIAGNOSIS — D50.9 IRON DEFICIENCY ANEMIA, UNSPECIFIED IRON DEFICIENCY ANEMIA TYPE: ICD-10-CM

## 2022-01-10 DIAGNOSIS — J45.20 MILD INTERMITTENT ASTHMA WITHOUT COMPLICATION: ICD-10-CM

## 2022-01-10 DIAGNOSIS — G47.33 OBSTRUCTIVE SLEEP APNEA SYNDROME: ICD-10-CM

## 2022-01-10 DIAGNOSIS — R29.898 WEAKNESS OF LEFT LEG: ICD-10-CM

## 2022-01-10 DIAGNOSIS — N30.20 CYSTITIS, CHRONIC: ICD-10-CM

## 2022-01-10 DIAGNOSIS — E78.00 PURE HYPERCHOLESTEROLEMIA: ICD-10-CM

## 2022-01-10 DIAGNOSIS — N39.41 URGE INCONTINENCE: ICD-10-CM

## 2022-01-10 DIAGNOSIS — E03.9 HYPOTHYROIDISM, UNSPECIFIED TYPE: ICD-10-CM

## 2022-01-10 DIAGNOSIS — I10 ESSENTIAL HYPERTENSION: Primary | ICD-10-CM

## 2022-01-10 DIAGNOSIS — I51.89 DIASTOLIC DYSFUNCTION: ICD-10-CM

## 2022-01-10 DIAGNOSIS — R41.3 MEMORY LOSS: ICD-10-CM

## 2022-01-10 DIAGNOSIS — N18.32 STAGE 3B CHRONIC KIDNEY DISEASE (HCC): ICD-10-CM

## 2022-01-10 DIAGNOSIS — E11.8 TYPE 2 DIABETES MELLITUS WITH COMPLICATION, WITHOUT LONG-TERM CURRENT USE OF INSULIN (HCC): ICD-10-CM

## 2022-01-10 DIAGNOSIS — K21.9 GASTROESOPHAGEAL REFLUX DISEASE WITHOUT ESOPHAGITIS: ICD-10-CM

## 2022-01-10 DIAGNOSIS — E53.8 B12 DEFICIENCY: ICD-10-CM

## 2022-01-10 DIAGNOSIS — F41.9 ANXIETY: ICD-10-CM

## 2022-01-10 DIAGNOSIS — Z86.73 HISTORY OF CVA (CEREBROVASCULAR ACCIDENT): ICD-10-CM

## 2022-01-10 DIAGNOSIS — F32.A DEPRESSION, UNSPECIFIED DEPRESSION TYPE: ICD-10-CM

## 2022-01-10 PROCEDURE — 99215 OFFICE O/P EST HI 40 MIN: CPT | Performed by: FAMILY MEDICINE

## 2022-01-10 RX ORDER — DONEPEZIL HYDROCHLORIDE 5 MG/1
5 TABLET, FILM COATED ORAL NIGHTLY
Qty: 30 TABLET | Refills: 1 | Status: SHIPPED | OUTPATIENT
Start: 2022-01-10 | End: 2022-02-09 | Stop reason: SDUPTHER

## 2022-01-10 ASSESSMENT — PATIENT HEALTH QUESTIONNAIRE - PHQ9
2. FEELING DOWN, DEPRESSED OR HOPELESS: 1
SUM OF ALL RESPONSES TO PHQ QUESTIONS 1-9: 2
1. LITTLE INTEREST OR PLEASURE IN DOING THINGS: 1
SUM OF ALL RESPONSES TO PHQ QUESTIONS 1-9: 2
SUM OF ALL RESPONSES TO PHQ QUESTIONS 1-9: 2
SUM OF ALL RESPONSES TO PHQ9 QUESTIONS 1 & 2: 2
SUM OF ALL RESPONSES TO PHQ QUESTIONS 1-9: 2

## 2022-01-10 ASSESSMENT — ENCOUNTER SYMPTOMS
ABDOMINAL DISTENTION: 0
VOMITING: 0
NAUSEA: 0
BACK PAIN: 0
SORE THROAT: 0
SHORTNESS OF BREATH: 0
RHINORRHEA: 0
DIARRHEA: 0
CHEST TIGHTNESS: 0
COUGH: 0
CONSTIPATION: 0
ABDOMINAL PAIN: 0

## 2022-01-10 NOTE — PROGRESS NOTES
Alina Montes MD    [address]  [id number]  [place] , [address]  [address] [address]  Dept: [phone]  Dept Fax: [phone]     Patient ID: Belén Yusuf is a 82 y.o. female. HPI    Established patient here today for f/u on chronic medical problems, go over labs and/or diagnostic studies, and medication refills. Pt denies any fever or chills. Pt today denies any HA, chest pain, or SOB. Pt denies any N/V/D/C or abdominal pain. Pt states mood is stable on meds. Pt with occasional heartburn, but stable on meds. Pt stable on CPAP with her sleep apnea and has been using nightly. Pt states urinary incontinence stable on meds. Pt states asthma is stable on inhalers. Pt is c/o memory loss and her family is seeing it as well. Otherwise pt doing well on current tx and no other concerns today. The patient's past medical, surgical, social, and family history as well as his current medications and allergies were reviewed as documented in today's encounter. My previous office notes, consult notes, labs and diagnostic studies were reviewed prior to and during encounter.     Current Outpatient Medications on File Prior to Visit   Medication Sig Dispense Refill    metFORMIN (GLUCOPHAGE) 500 MG tablet take 1 tablet by mouth every morning 90 tablet 3    DILT- MG extended release capsule take 1 capsule by mouth once daily 90 capsule 0    glimepiride (AMARYL) 4 MG tablet Take 1 tablet by mouth 2 times daily 180 tablet 3    DULoxetine (CYMBALTA) 30 MG extended release capsule Take one capsule daily along with the 60mg for a total of 90mg daily 90 capsule 3    oxybutynin (DITROPAN-XL) 5 MG extended release tablet Take 1 tablet by mouth daily 90 tablet 3    furosemide (LASIX) 20 MG tablet take 1 tablet by mouth once daily 30 tablet 11    potassium chloride (KLOR-CON) 10 MEQ extended release tablet take 1 tablet by mouth once daily 30 tablet 11    atorvastatin (LIPITOR) 10 MG tablet Take 1 tablet by mouth daily 90 tablet 3    DULoxetine (CYMBALTA) 60 MG extended release capsule take 1 capsule by mouth once daily 90 capsule 3    levothyroxine (SYNTHROID) 75 MCG tablet take 1 tablet by mouth once daily 90 tablet 3    cyanocobalamin 1000 MCG tablet Take 1,000 mcg by mouth every other day       budesonide-formoterol (SYMBICORT) 80-4.5 MCG/ACT AERO Inhale 2 puffs into the lungs 2 times daily (Patient taking differently: Inhale 2 puffs into the lungs 2 times daily as needed ) 1 Inhaler 11    Respiratory Therapy Supplies EFFIE PLEASE SUPPLY PATIENT WITH ALL NECESSARY SUPPLIES FOR HER CPAP MACHINE. 1 Device 0    Lancets MISC Indications: NO FURTHER REFILLS UNTIL SEEN IN THE OFFICE. Please dispense according to patients insurance/device. Patient testing weekly, and  each 0    blood glucose monitor strips Indications: NO FURTHER REFILLS UNTIL SEEN IN THE OFFICE. Please supply patient with test strip that insurance covers, tests weekly and PRN. 100 strip 0    polyethylene glycol (GLYCOLAX) packet Take 17 g by mouth daily as needed   0    cetirizine (ZYRTEC) 10 MG tablet Take 10 mg by mouth daily as needed.  Multiple Vitamin (THERA) TABS Take 1 tablet by mouth daily.  aspirin 81 MG EC tablet Take 81 mg by mouth daily. No current facility-administered medications on file prior to visit. Subjective:     Review of Systems   Constitutional: Negative for appetite change, fatigue and fever. HENT: Negative for congestion, ear pain, rhinorrhea and sore throat. Respiratory: Negative for cough, chest tightness and shortness of breath. Cardiovascular: Negative for chest pain and palpitations. Gastrointestinal: Negative for abdominal distention, abdominal pain, constipation, diarrhea, nausea and vomiting. Occ heartburn   Genitourinary: Positive for difficulty urinating (incontinence - stable). Negative for dysuria. Musculoskeletal: Positive for gait problem. Negative for arthralgias, back pain and myalgias. Skin: Negative for rash. Neurological: Positive for weakness (left side). Negative for dizziness, light-headedness and headaches. Hematological: Negative for adenopathy. Psychiatric/Behavioral: Positive for dysphoric mood (stable). Negative for behavioral problems and sleep disturbance. The patient is not nervous/anxious (stable). Memory loss       Objective:     Physical Exam  Vitals reviewed. Constitutional:       General: She is not in acute distress. Appearance: She is well-developed. HENT:      Head: Normocephalic and atraumatic. Right Ear: External ear normal.      Left Ear: External ear normal.      Nose: Nose normal.      Mouth/Throat:      Pharynx: No oropharyngeal exudate. Eyes:      Conjunctiva/sclera: Conjunctivae normal.      Pupils: Pupils are equal, round, and reactive to light. Cardiovascular:      Rate and Rhythm: Normal rate and regular rhythm. Heart sounds: Normal heart sounds. No murmur heard. Pulmonary:      Effort: Pulmonary effort is normal. No respiratory distress. Breath sounds: Normal breath sounds. No wheezing. Chest:      Chest wall: No tenderness. Abdominal:      General: Bowel sounds are normal. There is no distension. Palpations: Abdomen is soft. There is no mass. Tenderness: There is no abdominal tenderness. Musculoskeletal:         General: No tenderness. Normal range of motion. Cervical back: Normal range of motion. Lymphadenopathy:      Cervical: No cervical adenopathy. Skin:     Findings: No rash. Neurological:      Mental Status: She is alert and oriented to person, place, and time. Deep Tendon Reflexes: Reflexes are normal and symmetric. Psychiatric:         Behavior: Behavior normal.     MMSE here in the office was 26/30    Assessment:      Diagnosis Orders   1.  Essential hypertension  CBC Comprehensive Metabolic Panel   2. Pure hypercholesterolemia  Lipid Panel   3. Type 2 diabetes mellitus with complication, without long-term current use of insulin (HCC)  Comprehensive Metabolic Panel    Hemoglobin A1C   4. Hypothyroidism, unspecified type  TSH without Reflex    T4, Free   5. Stage 3b chronic kidney disease ([address]  Comprehensive Metabolic Panel   6. Iron deficiency anemia, unspecified iron deficiency anemia type  CBC   7. B12 deficiency  Vitamin B12   8. Gastroesophageal reflux disease without esophagitis     9. Obstructive sleep apnea syndrome  Comprehensive Metabolic Panel   10. Diastolic dysfunction  Comprehensive Metabolic Panel   11. Depression, unspecified depression type     12. Urge incontinence     13. Mild intermittent asthma without complication     14. History of CVA (cerebrovascular accident)     15. Anxiety     16. Weakness of left leg     17. Chronic cystitis.      18. Memory loss  CT HEAD W WO CONTRAST    donepezil (ARICEPT) 5 MG tablet    CBC    Comprehensive Metabolic Panel    TSH without Reflex    T4, Free    Vitamin B12         Plan:     Orders Placed This Encounter   Procedures    CT HEAD W WO CONTRAST     Standing Status:   Future     Standing Expiration Date:   1/10/2023    CBC     Standing Status:   Future     Standing Expiration Date:   1/10/2023    Comprehensive Metabolic Panel     Standing Status:   Future     Standing Expiration Date:   1/10/2023    Lipid Panel     Standing Status:   Future     Standing Expiration Date:   1/10/2023     Order Specific Question:   Is Patient Fasting?/# of Hours     Answer:   8-10 Hours    Hemoglobin A1C     Standing Status:   Future     Standing Expiration Date:   1/10/2023    TSH without Reflex     Standing Status:   Future     Standing Expiration Date:   1/10/2023    T4, Free     Standing Status:   Future     Standing Expiration Date:   1/10/2023    Vitamin B12     Standing Status:   Future     Standing Expiration Date:   1/10/2023 Orders Placed This Encounter   Medications    donepezil (ARICEPT) 5 MG tablet     Sig: Take 1 tablet by mouth nightly     Dispense:  30 tablet     Refill:  1       Will cont with the Cardizem as prescribed for BP control    Will cont with low fat/chol diet and Lipitor as ordered    Continue with ADA diet, current diabetic meds, and monitoring BS's as instructed    Cont with daily foot exams and yearly eye exams    No Ace-I/ARB secondary to angioedema    Will cont with the Synthroid as prescribed as the thyroid levels are stable    Will cont with the Cymbalta as prescribed as she is doing well    Stay well hydrated and no NSAID's    Pt stable on CPAP with her sleep apnea and has been using nightly and getting benefit from its use. I did have a d/w pt and pt's son and with her memory loss and a score of 26/30 on her MMSE, will start Aricept 5mg at night and back in a month and increase the dose if she tolerates    I am going to proceed with a CT Brain secondary to the memory loss    Rest of systems unchanged, continue current treatments. Medications, labs, diagnostic studies, consultations and follow-up as documented in this encounter. Rest of systems unchanged, continue current treatments    On this date January 10, 2022,  I have spent greater than 50% of visit reviewing previous notes, test results and face to face with the patient discussing the diagnoses, importance of compliance with the treatment plan, counseling, coordinating care as well as documenting on the day of the visit. Total time spent with this pt, reviewing the chart, and documentation was 42 minutes    Alina Cruz MD

## 2022-01-13 ENCOUNTER — TELEPHONE (OUTPATIENT)
Dept: FAMILY MEDICINE CLINIC | Age: 87
End: 2022-01-13

## 2022-01-24 ENCOUNTER — TELEPHONE (OUTPATIENT)
Dept: FAMILY MEDICINE CLINIC | Age: 87
End: 2022-01-24

## 2022-01-24 DIAGNOSIS — R41.3 MEMORY LOSS: Primary | ICD-10-CM

## 2022-01-24 NOTE — TELEPHONE ENCOUNTER
Kateryna Armenta from [facility] [address] states the head CT that was ordered for the Pt needs to be changed to without contrast. Kateryna Armenta states since it is for memory loss it can just be a CT without contrast. Kateryna Armenta states pt has appointment tomorrow to get this done. Kateryna Armenta states the new order can be faxed to [phone]    Please Advise.

## 2022-01-26 ENCOUNTER — OFFICE VISIT (OUTPATIENT)
Dept: UROLOGY | Age: 87
End: 2022-01-26
Payer: COMMERCIAL

## 2022-01-26 VITALS
DIASTOLIC BLOOD PRESSURE: 84 MMHG | HEIGHT: 60 IN | BODY MASS INDEX: 41.82 KG/M2 | WEIGHT: 213 LBS | TEMPERATURE: 95.6 F | SYSTOLIC BLOOD PRESSURE: 138 MMHG

## 2022-01-26 DIAGNOSIS — R39.15 URGENCY OF URINATION: ICD-10-CM

## 2022-01-26 DIAGNOSIS — R41.3 MEMORY LOSS: ICD-10-CM

## 2022-01-26 DIAGNOSIS — N39.46 MIXED STRESS AND URGE URINARY INCONTINENCE: Primary | ICD-10-CM

## 2022-01-26 DIAGNOSIS — R35.1 NOCTURIA: ICD-10-CM

## 2022-01-26 DIAGNOSIS — R35.0 URINARY FREQUENCY: ICD-10-CM

## 2022-01-26 PROCEDURE — 99213 OFFICE O/P EST LOW 20 MIN: CPT | Performed by: NURSE PRACTITIONER

## 2022-01-26 ASSESSMENT — ENCOUNTER SYMPTOMS
VOMITING: 0
SHORTNESS OF BREATH: 0
CONSTIPATION: 0
COUGH: 0
DIARRHEA: 0
NAUSEA: 0
BACK PAIN: 0
ABDOMINAL PAIN: 0
EYE PAIN: 0
WHEEZING: 0
EYE REDNESS: 0

## 2022-01-26 NOTE — PROGRESS NOTES
[address] [facility] [id number]  Dept: [address] [facility] Office Note - Established    Patient:  Zeke Yu  YOB: 1935  Date: 1/26/2022    The patient is a 82 y.o. female whopresents today for evaluation of the following problems:   Chief Complaint   Patient presents with    6 Month Follow-Up     OAB, INTERSTIM       HPI  Noemí Rodgers is presenting with her son and daughter for f/u OAB and interstim. She is doing well on current settings, remains on program 2 since last OV 6 mos ago. Son did increase amplitude once. Her symptoms are unchanged from 6 mos ago. Voiding about q2-3 hours, will have urge and stress incontinence wearing 2 briefs per day. Nocturia 0-1x. Wakes up incontinent of urine. She does not limit fluids before bed. Denies hematuria, dysuria, flank pain. No recent infections. Hx DM, a1c 6 mos ago was 7%. She is overall satisfied with current urinary symptoms. Summary of old records: N/A    Additional History: N/A    Procedures Today:   Interstim programming:     The programming head was placed over the implanted neurostimulator. Program 1 was enabled for patient use. Previous programed electrode selection settings:   Program 2, 2.7  Program 5 1.5  Program 6 1.5  Program 3 2.3     Final electrode setting: no setting changes. Urinalysis today:  No results found for this visit on 01/26/22.     Imaging Reviewed during this Office Visit: none  (results were independently reviewed by physician and radiology report verified)    Last BUN and creatinine:  Lab Results   Component Value Date    BUN 20 09/29/2021     Lab Results   Component Value Date    CREATININE 1.27 09/29/2021       Additional Lab/Culture results: none    PAST MEDICAL, FAMILY AND SOCIAL HISTORY UPDATE:  Past Medical History:   Diagnosis Date    Diverticulosis     Hyperlipidemia     Hypertension     Hypokalemia     Iron deficiency anemia     Menopause     s/p hysterectomy    Osteoarthritis     bilat hands, hips, knees    S/P partial colectomy 2/2002    Stroke ([address]     Thrombocytosis     Type II or unspecified type diabetes mellitus without mention of complication, not stated as uncontrolled 02/2007    Unspecified sleep apnea 08/2004    uses bipap    UTI (lower urinary tract infection)     re-curring     Past Surgical History:   Procedure Laterality Date    APPENDECTOMY  1993    CARDIOVASCULAR STRESS TEST  03/12    CARPAL TUNNEL RELEASE  1980's    bilat    CHOLECYSTECTOMY  1969    COLON SURGERY  1993    partial colectomy, d/t stricture    COLONOSCOPY  2001   [facility]    HERNIA REPAIR  09/27/2016    ventral/per Dr. Stein Mode    partial, d/t uterine prolapse    [facility]    lumbar fusion with cement    TONSILLECTOMY  1947    UPPER GASTROINTESTINAL ENDOSCOPY  03/08/2018    Dr. Martha Neil, WNL     Family History   Problem Relation Age of Onset    Heart Attack Mother     High Blood Pressure Mother     Heart Attack Father     High Blood Pressure Father     Colon Cancer Sister     Diabetes Maternal Aunt     Cancer Other         mcousin, colon, breast ca     Outpatient Medications Marked as Taking for the 1/26/22 encounter (Office Visit) with MARIBETH Lugo - CNP   Medication Sig Dispense Refill    donepezil (ARICEPT) 5 MG tablet Take 1 tablet by mouth nightly 30 tablet 1    metFORMIN (GLUCOPHAGE) 500 MG tablet take 1 tablet by mouth every morning 90 tablet 3    DILT- MG extended release capsule take 1 capsule by mouth once daily 90 capsule 0    glimepiride (AMARYL) 4 MG tablet Take 1 tablet by mouth 2 times daily 180 tablet 3    DULoxetine (CYMBALTA) 30 MG extended release capsule Take one capsule daily along with the 60mg for a total of 90mg daily 90 capsule 3    oxybutynin (DITROPAN-XL) 5 MG extended release tablet Take 1 tablet by mouth daily 90 tablet 3    furosemide (LASIX) 20 MG tablet take 1 tablet by mouth once daily 30 tablet 11    potassium chloride (KLOR-CON) 10 MEQ extended release tablet take 1 tablet by mouth once daily 30 tablet 11    atorvastatin (LIPITOR) 10 MG tablet Take 1 tablet by mouth daily 90 tablet 3    DULoxetine (CYMBALTA) 60 MG extended release capsule take 1 capsule by mouth once daily 90 capsule 3    levothyroxine (SYNTHROID) 75 MCG tablet take 1 tablet by mouth once daily 90 tablet 3    cyanocobalamin 1000 MCG tablet Take 1,000 mcg by mouth every other day       budesonide-formoterol (SYMBICORT) 80-4.5 MCG/ACT AERO Inhale 2 puffs into the lungs 2 times daily (Patient taking differently: Inhale 2 puffs into the lungs 2 times daily as needed ) 1 Inhaler 11    Respiratory Therapy Supplies EFFIE PLEASE SUPPLY PATIENT WITH ALL NECESSARY SUPPLIES FOR HER CPAP MACHINE. 1 Device 0    Lancets MISC Indications: NO FURTHER REFILLS UNTIL SEEN IN THE OFFICE. Please dispense according to patients insurance/device. Patient testing weekly, and  each 0    blood glucose monitor strips Indications: NO FURTHER REFILLS UNTIL SEEN IN THE OFFICE. Please supply patient with test strip that insurance covers, tests weekly and PRN. 100 strip 0    polyethylene glycol (GLYCOLAX) packet Take 17 g by mouth daily as needed   0    cetirizine (ZYRTEC) 10 MG tablet Take 10 mg by mouth daily as needed.  Multiple Vitamin (THERA) TABS Take 1 tablet by mouth daily.  aspirin 81 MG EC tablet Take 81 mg by mouth daily.           (All medications reviewed and updated by provider sincelast office visit or hospitalization)   Pcn [penicillins], Demerol, Effexor [venlafaxine], Lisinopril, and Macrobid [nitrofurantoin monohydrate macrocrystals]  Social History     Tobacco Use   Smoking Status Never Smoker   Smokeless Tobacco Never Used      (If patient a smoker, smoking cessation counseling offered)     Social History     Substance and Sexual Activity   Alcohol Use No       REVIEW OF SYSTEMS:  Review of Systems      Physical Exam:      Vitals:    01/26/22 1302   BP: 138/84   Temp: 95.6 °F (35.3 °C)     Body mass index is 41.6 kg/m². Patient is a 82 y.o. female in noacute distress and alert and oriented to person, place and time. Physical Exam  Constitutional: Patient in no acute distress. Neuro: Alert andoriented to person, place and time. Left sided hemiplegia. Psych: Mood normal, affect normal  Skin: No rash noted  Lungs: Respiratory effort is normal  Cardiovascular: Warm & Pink  Abdomen: Soft, non-tender, non-distended   Bladder non-tender and not distended. Musculoskeletal: wheelchair. and Plan      1. Mixed stress and urge urinary incontinence    2. Urgency of urination    3. Urinary frequency    4. Nocturia           Plan:    Doing well on current settings, she is happy with current urinary symptoms. Re-educated on making interstim changes.    -if urinary symptoms worsen, try program 1. Increase amplitude as needed. Programs 4 and 7 have not been used recently so I would recommend using those programs if settings need to be changed. Continue behavioral modifications as discussed- set alarm for q2 hour voiding. Double voiding. Avoid irritants. Limit fluids 2-3 hours prior to void. Call with any new or worsening urinary symptoms. Call with any blood in urine or burning that does not go away with increasing water intake. Return in about 1 year (around 1/26/2023). Prescriptions Ordered:  No orders of the defined types were placed in this encounter. Orders Placed:  No orders of the defined types were placed in this encounter. MARIBETH Cunha CNP    Reviewed and agree with the ROS entered by the MA.

## 2022-01-26 NOTE — PATIENT INSTRUCTIONS
Doing well on current settings    Re-educated on making interstim changes. if urinary symptoms worsen, try program 1. Increase amplitude as needed. Programs 4 and 7 have not been used recently so I would recommend using those programs if settings need to be changed. Previous programed electrode selection settings:   Program 2, 2.7  Program 5 1.5  Program 6 1.5  Program 3 2.3      Continue behavioral modifications as discussed- set alarm for q2 hour voiding. Double voiding. Avoid irritants. Limit fluids 2-3 hours prior to void. Call with any new or worsening urinary symptoms. Call with any blood in urine or burning that does not go away with increasing water intake. Return in about 1 year (around 1/26/2023).

## 2022-02-09 ENCOUNTER — VIRTUAL VISIT (OUTPATIENT)
Dept: FAMILY MEDICINE CLINIC | Age: 87
End: 2022-02-09
Payer: COMMERCIAL

## 2022-02-09 DIAGNOSIS — R41.3 MEMORY LOSS: ICD-10-CM

## 2022-02-09 DIAGNOSIS — R41.89 COGNITIVE DECLINE: Primary | ICD-10-CM

## 2022-02-09 PROCEDURE — 99213 OFFICE O/P EST LOW 20 MIN: CPT | Performed by: FAMILY MEDICINE

## 2022-02-09 RX ORDER — DONEPEZIL HYDROCHLORIDE 10 MG/1
10 TABLET, FILM COATED ORAL NIGHTLY
Qty: 90 TABLET | Refills: 3 | Status: SHIPPED | OUTPATIENT
Start: 2022-02-09

## 2022-02-09 ASSESSMENT — ENCOUNTER SYMPTOMS
ABDOMINAL DISTENTION: 0
SHORTNESS OF BREATH: 0
CHEST TIGHTNESS: 0
VOMITING: 0
RHINORRHEA: 0
DIARRHEA: 0
CONSTIPATION: 0
BACK PAIN: 0
COUGH: 0
ABDOMINAL PAIN: 0
SORE THROAT: 0
NAUSEA: 0

## 2022-02-09 NOTE — PROGRESS NOTES
Alina Grant MD  [address]  [address]  [address] [address]  [address] [address]  Dept: [phone]  Dept Fax: [phone]     Patient ID: Rosalie Daily is a 82 y.o. female. HPI    Established patient presents via virtual visit for an acute visit secondary to memory loss. We did start pt on Aricept at her last visit. She has seen just a little improvement with her memory. She is tolerating the medicine with no side effects. Pt denies any fever or chills. Pt today denies any HA, chest pain, or SOB. Pt denies any N/V/D/C or abdominal pain. Otherwise pt doing well on current tx and no other concerns today. The patient's past medical, surgical, social, and family history as well as his current medications and allergies were reviewed as documented in today's encounter. My previous office notes, consult notes, labs and diagnostic studies were reviewed prior to and during encounter.     Current Outpatient Medications on File Prior to Visit   Medication Sig Dispense Refill    donepezil (ARICEPT) 5 MG tablet Take 1 tablet by mouth nightly 30 tablet 1    metFORMIN (GLUCOPHAGE) 500 MG tablet take 1 tablet by mouth every morning 90 tablet 3    DILT- MG extended release capsule take 1 capsule by mouth once daily 90 capsule 0    glimepiride (AMARYL) 4 MG tablet Take 1 tablet by mouth 2 times daily 180 tablet 3    DULoxetine (CYMBALTA) 30 MG extended release capsule Take one capsule daily along with the 60mg for a total of 90mg daily 90 capsule 3    oxybutynin (DITROPAN-XL) 5 MG extended release tablet Take 1 tablet by mouth daily 90 tablet 3    furosemide (LASIX) 20 MG tablet take 1 tablet by mouth once daily 30 tablet 11    potassium chloride (KLOR-CON) 10 MEQ extended release tablet take 1 tablet by mouth once daily 30 tablet 11    atorvastatin (LIPITOR) 10 MG tablet Take 1 tablet by mouth daily 90 tablet 3    DULoxetine (CYMBALTA) 60 MG extended release capsule take 1 capsule by mouth once daily 90 capsule 3    levothyroxine (SYNTHROID) 75 MCG tablet take 1 tablet by mouth once daily 90 tablet 3    cyanocobalamin 1000 MCG tablet Take 1,000 mcg by mouth every other day       budesonide-formoterol (SYMBICORT) 80-4.5 MCG/ACT AERO Inhale 2 puffs into the lungs 2 times daily (Patient taking differently: Inhale 2 puffs into the lungs 2 times daily as needed ) 1 Inhaler 11    Respiratory Therapy Supplies EFFIE PLEASE SUPPLY PATIENT WITH ALL NECESSARY SUPPLIES FOR HER CPAP MACHINE. 1 Device 0    Lancets MISC Indications: NO FURTHER REFILLS UNTIL SEEN IN THE OFFICE. Please dispense according to patients insurance/device. Patient testing weekly, and  each 0    blood glucose monitor strips Indications: NO FURTHER REFILLS UNTIL SEEN IN THE OFFICE. Please supply patient with test strip that insurance covers, tests weekly and PRN. 100 strip 0    polyethylene glycol (GLYCOLAX) packet Take 17 g by mouth daily as needed   0    cetirizine (ZYRTEC) 10 MG tablet Take 10 mg by mouth daily as needed.  Multiple Vitamin (THERA) TABS Take 1 tablet by mouth daily.  aspirin 81 MG EC tablet Take 81 mg by mouth daily. No current facility-administered medications on file prior to visit. Subjective:     Review of Systems   Constitutional: Negative for appetite change, fatigue and fever. HENT: Negative for congestion, ear pain, rhinorrhea and sore throat. Respiratory: Negative for cough, chest tightness and shortness of breath. Cardiovascular: Negative for chest pain and palpitations. Gastrointestinal: Negative for abdominal distention, abdominal pain, constipation, diarrhea, nausea and vomiting. Genitourinary: Negative for difficulty urinating and dysuria. Musculoskeletal: Negative for arthralgias, back pain and myalgias. Skin: Negative for rash.    Neurological: Negative for dizziness, weakness, light-headedness and headaches. Hematological: Negative for adenopathy. Psychiatric/Behavioral: Negative for behavioral problems and sleep disturbance. The patient is not nervous/anxious. Memory loss       Objective:     Physical Exam  Vitals reviewed: Vital signs unavailable, as this is a virtual visit. Constitutional:       General: She is not in acute distress. Appearance: Normal appearance. She is not ill-appearing or toxic-appearing. Pulmonary:      Effort: Pulmonary effort is normal. No tachypnea, accessory muscle usage or respiratory distress. Comments: Patient able to talk in full sentences without difficulty   Neurological:      General: No focal deficit present. Mental Status: She is alert and oriented to person, place, and time. Psychiatric:         Mood and Affect: Mood normal.         Speech: Speech normal.         Behavior: Behavior normal. Behavior is cooperative. Assessment:      Diagnosis Orders   1. Cognitive decline     2. Memory loss         Plan:        Orders Placed This Encounter   Medications    donepezil (ARICEPT) 10 MG tablet     Sig: Take 1 tablet by mouth nightly     Dispense:  90 tablet     Refill:  3     Will increase the Aricept to 10mg at night    Will keep her regular appt in April and will see how she is doing    Rest of systems unchanged, continue current treatments. Medications, labs, diagnostic studies, consultations and follow-up as documented in this encounter. Rest of systems unchanged, continue current treatments    On this date February 9, 2022,  I have spent greater than 50% of this visit reviewing previous notes, test results and face to face with the patient discussing the diagnoses, importance of compliance with the treatment plan, counseling, coordinating care as well as documenting on the day of the visit. Adrian Peterson is a 82 y.o. female being evaluated by a Virtual Visit (video visit) encounter to address concerns as mentioned above.   ARON caregiver was present when appropriate. Due to this being a TeleHealth encounter (During JQKIV-23 public health emergency), evaluation of the following organ systems was limited: Vitals/Constitutional/EENT/Resp/CV/GI//MS/Neuro/Skin/Heme-Lymph-Imm. Pursuant to the emergency declaration under the [address] [place] [address] and the [employer] and [employer] Act, this Virtual Visit was conducted with patient's (and/or legal guardian's) consent, to reduce the patient's risk of exposure to COVID-19 and provide necessary medical care. The patient (and/or legal guardian) has also been advised to contact this office for worsening conditions or problems, and seek emergency medical treatment and/or call 911 if deemed necessary. Patient identification was verified at the start of the visit: Yes    Total time spent for this encounter: Not billed by time    Services were provided through a video synchronous discussion virtually to substitute for in-person clinic visit. Patient and provider were located at their individual homes. --Frannie Ontiveros MD on 2/9/2022 at 10:20 AM    An electronic signature was used to authenticate this note. Alina Dash MD

## 2022-03-22 DIAGNOSIS — F41.9 ANXIETY: ICD-10-CM

## 2022-03-22 RX ORDER — LORAZEPAM 0.5 MG/1
TABLET ORAL
Qty: 60 TABLET | Refills: 3 | Status: SHIPPED | OUTPATIENT
Start: 2022-03-22 | End: 2022-04-21

## 2022-03-22 NOTE — TELEPHONE ENCOUNTER
Please Approve or Refuse.   Send to Pharmacy per Pt's Request:      Next Visit Date:  4/4/2022   Last Visit Date: 2/9/2022    Hemoglobin A1C (%)   Date Value   09/29/2021 7.4   07/19/2021 7.0   03/29/2021 6.5             ( goal A1C is < 7)   BP Readings from Last 3 Encounters:   01/26/22 138/84   01/10/22 132/74   10/05/21 136/88          (goal 120/80)  BUN   Date Value Ref Range Status   09/29/2021 20 mg/dL Final     CREATININE   Date Value Ref Range Status   09/29/2021 1.27  Final     Potassium   Date Value Ref Range Status   09/29/2021 3.8 mmol/L Final

## 2022-04-04 ENCOUNTER — OFFICE VISIT (OUTPATIENT)
Dept: FAMILY MEDICINE CLINIC | Age: 87
End: 2022-04-04
Payer: COMMERCIAL

## 2022-04-04 VITALS
DIASTOLIC BLOOD PRESSURE: 66 MMHG | TEMPERATURE: 99 F | SYSTOLIC BLOOD PRESSURE: 124 MMHG | BODY MASS INDEX: 41.4 KG/M2 | HEART RATE: 72 BPM | WEIGHT: 212 LBS | OXYGEN SATURATION: 96 % | RESPIRATION RATE: 16 BRPM

## 2022-04-04 DIAGNOSIS — G47.33 OBSTRUCTIVE SLEEP APNEA SYNDROME: ICD-10-CM

## 2022-04-04 DIAGNOSIS — N30.20 CYSTITIS, CHRONIC: ICD-10-CM

## 2022-04-04 DIAGNOSIS — F41.9 ANXIETY: ICD-10-CM

## 2022-04-04 DIAGNOSIS — I10 ESSENTIAL HYPERTENSION: Primary | ICD-10-CM

## 2022-04-04 DIAGNOSIS — E78.00 PURE HYPERCHOLESTEROLEMIA: ICD-10-CM

## 2022-04-04 DIAGNOSIS — R29.898 WEAKNESS OF LEFT LEG: ICD-10-CM

## 2022-04-04 DIAGNOSIS — Z86.73 HISTORY OF CVA (CEREBROVASCULAR ACCIDENT): ICD-10-CM

## 2022-04-04 DIAGNOSIS — D50.9 IRON DEFICIENCY ANEMIA, UNSPECIFIED IRON DEFICIENCY ANEMIA TYPE: ICD-10-CM

## 2022-04-04 DIAGNOSIS — J45.20 MILD INTERMITTENT ASTHMA WITHOUT COMPLICATION: ICD-10-CM

## 2022-04-04 DIAGNOSIS — N39.41 URGE INCONTINENCE: ICD-10-CM

## 2022-04-04 DIAGNOSIS — K21.9 GASTROESOPHAGEAL REFLUX DISEASE WITHOUT ESOPHAGITIS: ICD-10-CM

## 2022-04-04 DIAGNOSIS — F32.A DEPRESSION, UNSPECIFIED DEPRESSION TYPE: ICD-10-CM

## 2022-04-04 DIAGNOSIS — E53.8 B12 DEFICIENCY: ICD-10-CM

## 2022-04-04 DIAGNOSIS — E11.8 TYPE 2 DIABETES MELLITUS WITH COMPLICATION, WITHOUT LONG-TERM CURRENT USE OF INSULIN (HCC): ICD-10-CM

## 2022-04-04 DIAGNOSIS — N18.32 STAGE 3B CHRONIC KIDNEY DISEASE (HCC): ICD-10-CM

## 2022-04-04 DIAGNOSIS — E03.9 HYPOTHYROIDISM, UNSPECIFIED TYPE: ICD-10-CM

## 2022-04-04 DIAGNOSIS — I49.9 IRREGULAR HEART RHYTHM: ICD-10-CM

## 2022-04-04 DIAGNOSIS — R41.89 COGNITIVE DECLINE: ICD-10-CM

## 2022-04-04 PROCEDURE — 99214 OFFICE O/P EST MOD 30 MIN: CPT | Performed by: FAMILY MEDICINE

## 2022-04-04 RX ORDER — DULOXETIN HYDROCHLORIDE 60 MG/1
CAPSULE, DELAYED RELEASE ORAL
Qty: 180 CAPSULE | Refills: 3 | Status: SHIPPED | OUTPATIENT
Start: 2022-04-04

## 2022-04-04 ASSESSMENT — ENCOUNTER SYMPTOMS
CHEST TIGHTNESS: 0
RHINORRHEA: 0
DIARRHEA: 0
SORE THROAT: 0
CONSTIPATION: 0
NAUSEA: 0
ABDOMINAL PAIN: 0
ABDOMINAL DISTENTION: 0
SHORTNESS OF BREATH: 0
BACK PAIN: 0
VOMITING: 0
COUGH: 0

## 2022-04-04 NOTE — PROGRESS NOTES
Alina De La Rosa MD    [address]  [address]  [address]  [address] [address]  Dept: [phone]  Dept Fax: [phone]     Patient ID: Tamika Santillan is a 82 y.o. female. HPI    Established patient here today for f/u on chronic medical problems, go over labs and/or diagnostic studies, and medication refills. Pt denies any fever or chills. Pt today denies any HA, chest pain, or SOB. Pt denies any N/V/D/C or abdominal pain. Pt has been c/o fatigue and is feeling depressed. She is taking the Cymbalta as prescribed. Pt with occasional heartburn, but stable on meds. Pt stable on CPAP with her sleep apnea and has been using nightly. Pt states urinary incontinence stable on meds. Pt states asthma is stable on inhalers. We did increase her Aricept at her last visit. She is c/o LE swelling and does take the Lasix daily. Otherwise pt doing well on current tx and no other concerns today. The patient's past medical, surgical, social, and family history as well as his current medications and allergies were reviewed as documented in today's encounter. My previous office notes, consult notes, labs and diagnostic studies were reviewed prior to and during encounter.     Current Outpatient Medications on File Prior to Visit   Medication Sig Dispense Refill    metroNIDAZOLE (METROCREAM) 0.75 % cream apply topically twice a day      LORazepam (ATIVAN) 0.5 MG tablet take 1 tablet by mouth twice a day if needed for anxiety 60 tablet 3    DILT- MG extended release capsule take 1 capsule by mouth once daily 90 capsule 0    donepezil (ARICEPT) 10 MG tablet Take 1 tablet by mouth nightly 90 tablet 3    metFORMIN (GLUCOPHAGE) 500 MG tablet take 1 tablet by mouth every morning 90 tablet 3    glimepiride (AMARYL) 4 MG tablet Take 1 tablet by mouth 2 times daily 180 tablet 3    oxybutynin (DITROPAN-XL) 5 MG extended release tablet Take 1 tablet by constipation, diarrhea, nausea and vomiting. Occ heartburn   Genitourinary: Positive for difficulty urinating (incontinence - stable). Negative for dysuria. Musculoskeletal: Positive for gait problem. Negative for arthralgias, back pain and myalgias. Skin: Negative for rash. Neurological: Positive for weakness (left side). Negative for dizziness, light-headedness and headaches. Hematological: Negative for adenopathy. Psychiatric/Behavioral: Positive for dysphoric mood. Negative for behavioral problems and sleep disturbance. The patient is not nervous/anxious (stable). Memory loss       Objective:     Physical Exam  Vitals reviewed. Constitutional:       General: She is not in acute distress. Appearance: She is well-developed. HENT:      Head: Normocephalic and atraumatic. Right Ear: External ear normal.      Left Ear: External ear normal.      Nose: Nose normal.      Mouth/Throat:      Pharynx: No oropharyngeal exudate. Eyes:      Conjunctiva/sclera: Conjunctivae normal.      Pupils: Pupils are equal, round, and reactive to light. Cardiovascular:      Rate and Rhythm: Normal rate. Rhythm irregular. Heart sounds: Normal heart sounds. No murmur heard. Pulmonary:      Effort: Pulmonary effort is normal. No respiratory distress. Breath sounds: Normal breath sounds. No wheezing. Chest:      Chest wall: No tenderness. Abdominal:      General: Bowel sounds are normal. There is no distension. Palpations: Abdomen is soft. There is no mass. Tenderness: There is no abdominal tenderness. Musculoskeletal:         General: No tenderness. Normal range of motion. Cervical back: Normal range of motion. Lymphadenopathy:      Cervical: No cervical adenopathy. Skin:     Findings: No rash. Neurological:      Mental Status: She is alert and oriented to person, place, and time. Deep Tendon Reflexes: Reflexes are normal and symmetric.    Psychiatric: Behavior: Behavior normal.       EKG: normal sinus rhythm, rate - 62; 2nd degree block. Assessment:      Diagnosis Orders   1. Essential hypertension  CBC    Basic Metabolic Panel   2. Pure hypercholesterolemia     3. Type 2 diabetes mellitus with complication, without long-term current use of insulin ([facility])  Basic Metabolic Panel    Hemoglobin A1C   4. Stage 3b chronic kidney disease ([facility] [address]  Basic Metabolic Panel   5. Iron deficiency anemia, unspecified iron deficiency anemia type  CBC   6. B12 deficiency     7. Gastroesophageal reflux disease without esophagitis     8. Urge incontinence     9. Depression, unspecified depression type  DULoxetine (CYMBALTA) 60 MG extended release capsule   10. Anxiety     11. Cognitive decline     12. Hypothyroidism, unspecified type     13. Chronic cystitis. 14. Obstructive sleep apnea syndrome     15. Mild intermittent asthma without complication     16. History of CVA (cerebrovascular accident)     16. Weakness of left leg     18.  Irregular heart rhythm  EKG 12 Lead    EKG 12 Lead         Plan:     Orders Placed This Encounter   Procedures    CBC     Standing Status:   Future     Standing Expiration Date:   4/4/2023    Basic Metabolic Panel     Standing Status:   Future     Standing Expiration Date:   4/4/2023    Hemoglobin A1C     Standing Status:   Future     Standing Expiration Date:   4/4/2023    EKG 12 Lead     Standing Status:   Future     Number of Occurrences:   1     Standing Expiration Date:   6/3/2022     Order Specific Question:   Reason for Exam?     Answer:   Irregular heart rate      Orders Placed This Encounter   Medications    DULoxetine (CYMBALTA) 60 MG extended release capsule     Sig: take 2 capsules by mouth once daily     Dispense:  180 capsule     Refill:  3     Will cont with the Cardizem as prescribed for BP control    Will cont with low fat/chol diet and Lipitor as ordered    Continue with ADA diet, current diabetic meds, and monitoring BS's as instructed    Cont with daily foot exams and yearly eye exams    No Ace-I/ARB secondary to angioedema    Will cont with the Synthroid as prescribed as the thyroid levels are stable    Will increase the Cymbalta to 120mg daily to see if will help with her depression and fatigue    Stay well hydrated and no NSAID's    Pt stable on CPAP with her sleep apnea and has been using nightly and getting benefit from its use. Will cont with the Aricept 10mg daily     Will cont with the Lasix 20mg daily for the LE swelling and I do want her to try the compression stockings and keep her legs elevated    Her EKG here in the office did show NSR and a 2nd degree block - will monitor her on the Cardizem    Rest of systems unchanged, continue current treatments. Medications, labs, diagnostic studies, consultations and follow-up as documented in this encounter. Rest of systems unchanged, continue current treatments    On this date April 4, 2022,  I have spent greater than 50% of visit reviewing previous notes, test results and face to face with the patient discussing the diagnoses, importance of compliance with the treatment plan, counseling, coordinating care as well as documenting on the day of the visit. Alina Gallegos MD

## 2022-06-02 DIAGNOSIS — E78.00 PURE HYPERCHOLESTEROLEMIA: ICD-10-CM

## 2022-06-02 RX ORDER — ATORVASTATIN CALCIUM 10 MG/1
TABLET, FILM COATED ORAL
Qty: 90 TABLET | Refills: 3 | Status: SHIPPED | OUTPATIENT
Start: 2022-06-02

## 2022-06-02 RX ORDER — POTASSIUM CHLORIDE 750 MG/1
TABLET, FILM COATED, EXTENDED RELEASE ORAL
Qty: 30 TABLET | Refills: 11 | Status: SHIPPED | OUTPATIENT
Start: 2022-06-02

## 2022-06-17 RX ORDER — FUROSEMIDE 20 MG/1
TABLET ORAL
Qty: 30 TABLET | Refills: 11 | Status: SHIPPED | OUTPATIENT
Start: 2022-06-17

## 2022-06-21 DIAGNOSIS — E03.9 HYPOTHYROIDISM, UNSPECIFIED TYPE: ICD-10-CM

## 2022-06-21 RX ORDER — LEVOTHYROXINE SODIUM 0.07 MG/1
TABLET ORAL
Qty: 90 TABLET | Refills: 3 | Status: SHIPPED | OUTPATIENT
Start: 2022-06-21

## 2022-06-21 NOTE — TELEPHONE ENCOUNTER
Please Approve or Refuse.   Send to Pharmacy per Pt's Request:      Next Visit Date:  7/5/2022   Last Visit Date: 4/4/2022    Hemoglobin A1C (%)   Date Value   09/29/2021 7.4   07/19/2021 7.0   03/29/2021 6.5             ( goal A1C is < 7)   BP Readings from Last 3 Encounters:   04/04/22 124/66   01/26/22 138/84   01/10/22 132/74          (goal 120/80)  BUN   Date Value Ref Range Status   09/29/2021 20 mg/dL Final     CREATININE   Date Value Ref Range Status   09/29/2021 1.27  Final     Potassium   Date Value Ref Range Status   09/29/2021 3.8 mmol/L Final

## 2022-07-05 ENCOUNTER — OFFICE VISIT (OUTPATIENT)
Dept: FAMILY MEDICINE CLINIC | Age: 87
End: 2022-07-05
Payer: COMMERCIAL

## 2022-07-05 VITALS
OXYGEN SATURATION: 95 % | TEMPERATURE: 98.4 F | HEART RATE: 78 BPM | RESPIRATION RATE: 18 BRPM | SYSTOLIC BLOOD PRESSURE: 136 MMHG | DIASTOLIC BLOOD PRESSURE: 82 MMHG | BODY MASS INDEX: 42.58 KG/M2 | WEIGHT: 218 LBS

## 2022-07-05 DIAGNOSIS — E11.8 TYPE 2 DIABETES MELLITUS WITH COMPLICATION, WITHOUT LONG-TERM CURRENT USE OF INSULIN (HCC): ICD-10-CM

## 2022-07-05 DIAGNOSIS — E03.9 HYPOTHYROIDISM, UNSPECIFIED TYPE: ICD-10-CM

## 2022-07-05 DIAGNOSIS — G47.33 OBSTRUCTIVE SLEEP APNEA SYNDROME: ICD-10-CM

## 2022-07-05 DIAGNOSIS — Z86.73 HISTORY OF CVA (CEREBROVASCULAR ACCIDENT): ICD-10-CM

## 2022-07-05 DIAGNOSIS — N18.32 STAGE 3B CHRONIC KIDNEY DISEASE (HCC): ICD-10-CM

## 2022-07-05 DIAGNOSIS — F32.A DEPRESSION, UNSPECIFIED DEPRESSION TYPE: ICD-10-CM

## 2022-07-05 DIAGNOSIS — R41.89 COGNITIVE DECLINE: ICD-10-CM

## 2022-07-05 DIAGNOSIS — I10 ESSENTIAL HYPERTENSION: Primary | ICD-10-CM

## 2022-07-05 DIAGNOSIS — J45.20 MILD INTERMITTENT ASTHMA WITHOUT COMPLICATION: ICD-10-CM

## 2022-07-05 DIAGNOSIS — E53.8 B12 DEFICIENCY: ICD-10-CM

## 2022-07-05 DIAGNOSIS — N30.20 CYSTITIS, CHRONIC: ICD-10-CM

## 2022-07-05 DIAGNOSIS — F41.9 ANXIETY: ICD-10-CM

## 2022-07-05 DIAGNOSIS — D50.9 IRON DEFICIENCY ANEMIA, UNSPECIFIED IRON DEFICIENCY ANEMIA TYPE: ICD-10-CM

## 2022-07-05 DIAGNOSIS — E78.00 PURE HYPERCHOLESTEROLEMIA: ICD-10-CM

## 2022-07-05 DIAGNOSIS — K21.9 GASTROESOPHAGEAL REFLUX DISEASE WITHOUT ESOPHAGITIS: ICD-10-CM

## 2022-07-05 DIAGNOSIS — I10 ESSENTIAL HYPERTENSION: ICD-10-CM

## 2022-07-05 DIAGNOSIS — N39.41 URGE INCONTINENCE: ICD-10-CM

## 2022-07-05 LAB
AVERAGE GLUCOSE: 154
BASOPHILS ABSOLUTE: NORMAL
BASOPHILS RELATIVE PERCENT: NORMAL
BUN BLDV-MCNC: 22 MG/DL
CALCIUM SERPL-MCNC: 9.6 MG/DL
CHLORIDE BLD-SCNC: 100 MMOL/L
CO2: 25 MMOL/L
CREAT SERPL-MCNC: 1.16 MG/DL
EOSINOPHILS ABSOLUTE: NORMAL
EOSINOPHILS RELATIVE PERCENT: NORMAL
GFR CALCULATED: 44
GLUCOSE BLD-MCNC: 180 MG/DL
HBA1C MFR BLD: 7 %
HCT VFR BLD CALC: 37.8 % (ref 36–46)
HEMOGLOBIN: 12.6 G/DL (ref 12–16)
LYMPHOCYTES ABSOLUTE: NORMAL
LYMPHOCYTES RELATIVE PERCENT: NORMAL
MCH RBC QN AUTO: NORMAL PG
MCHC RBC AUTO-ENTMCNC: NORMAL G/DL
MCV RBC AUTO: NORMAL FL
MONOCYTES ABSOLUTE: NORMAL
MONOCYTES RELATIVE PERCENT: NORMAL
NEUTROPHILS ABSOLUTE: NORMAL
NEUTROPHILS RELATIVE PERCENT: NORMAL
PLATELET # BLD: 368 K/ΜL
PMV BLD AUTO: NORMAL FL
POTASSIUM SERPL-SCNC: 4 MMOL/L
RBC # BLD: NORMAL 10*6/UL
SODIUM BLD-SCNC: 139 MMOL/L
WBC # BLD: 9 10^3/ML

## 2022-07-05 PROCEDURE — 3051F HG A1C>EQUAL 7.0%<8.0%: CPT | Performed by: FAMILY MEDICINE

## 2022-07-05 PROCEDURE — 1123F ACP DISCUSS/DSCN MKR DOCD: CPT | Performed by: FAMILY MEDICINE

## 2022-07-05 PROCEDURE — 99214 OFFICE O/P EST MOD 30 MIN: CPT | Performed by: FAMILY MEDICINE

## 2022-07-05 ASSESSMENT — ENCOUNTER SYMPTOMS
COUGH: 0
SORE THROAT: 0
NAUSEA: 0
CONSTIPATION: 0
CHEST TIGHTNESS: 0
SHORTNESS OF BREATH: 0
RHINORRHEA: 0
ABDOMINAL DISTENTION: 0
DIARRHEA: 0
BACK PAIN: 0
ABDOMINAL PAIN: 0
VOMITING: 0

## 2022-07-05 NOTE — PROGRESS NOTES
Alina Bridges MD    [address]  [id number]  [address] [address]  Dept: [phone]  Dept Fax: [phone]     Patient ID: Nu De La Cruz is a 82 y.o. female. HPI    Established patient here today for f/u on chronic medical problems, go over labs and/or diagnostic studies, and medication refills. Pt denies any fever or chills. Pt today denies any HA, chest pain, or SOB. Pt denies any N/V/D/C or abdominal pain. Pt does feel depressed at times and c/o fatigue. We did increase her Cymbalta at her last visit. Pt with occasional heartburn, but stable on meds. Pt stable on CPAP with her sleep apnea and has been using nightly. Pt states urinary incontinence stable on meds. Pt states asthma is stable on inhalers. She is c/o right upper arm pain. Otherwise pt doing well on current tx and no other concerns today. The patient's past medical, surgical, social, and family history as well as his current medications and allergies were reviewed as documented in today's encounter. My previous office notes, consult notes, labs and diagnostic studies were reviewed prior to and during encounter.     Current Outpatient Medications on File Prior to Visit   Medication Sig Dispense Refill    dilTIAZem (DILT-XR) 240 MG extended release capsule Take 1 capsule by mouth daily 90 capsule 3    levothyroxine (SYNTHROID) 75 MCG tablet take 1 tablet by mouth once daily 90 tablet 3    furosemide (LASIX) 20 MG tablet take 1 tablet by mouth once daily 30 tablet 11    atorvastatin (LIPITOR) 10 MG tablet take 1 tablet by mouth once daily 90 tablet 3    potassium chloride (KLOR-CON) 10 MEQ extended release tablet take 1 tablet by mouth once daily 30 tablet 11    metroNIDAZOLE (METROCREAM) 0.75 % cream apply topically twice a day      DULoxetine (CYMBALTA) 60 MG extended release capsule take 2 capsules by mouth once daily 180 capsule 3    donepezil (ARICEPT) 10 MG tablet Take 1 tablet by mouth nightly 90 tablet 3    metFORMIN (GLUCOPHAGE) 500 MG tablet take 1 tablet by mouth every morning 90 tablet 3    glimepiride (AMARYL) 4 MG tablet Take 1 tablet by mouth 2 times daily 180 tablet 3    oxybutynin (DITROPAN-XL) 5 MG extended release tablet Take 1 tablet by mouth daily 90 tablet 3    cyanocobalamin 1000 MCG tablet Take 1,000 mcg by mouth every other day       budesonide-formoterol (SYMBICORT) 80-4.5 MCG/ACT AERO Inhale 2 puffs into the lungs 2 times daily (Patient taking differently: Inhale 2 puffs into the lungs 2 times daily as needed ) 1 Inhaler 11    Respiratory Therapy Supplies [employer] PLEASE SUPPLY PATIENT WITH ALL NECESSARY SUPPLIES FOR HER CPAP MACHINE. 1 Device 0    Lancets MISC Indications: NO FURTHER REFILLS UNTIL SEEN IN THE OFFICE. Please dispense according to patients insurance/device. Patient testing weekly, and  each 0    blood glucose monitor strips Indications: NO FURTHER REFILLS UNTIL SEEN IN THE OFFICE. Please supply patient with test strip that insurance covers, tests weekly and PRN. 100 strip 0    polyethylene glycol (GLYCOLAX) packet Take 17 g by mouth daily as needed   0    cetirizine (ZYRTEC) 10 MG tablet Take 10 mg by mouth daily as needed.  Multiple Vitamin (THERA) TABS Take 1 tablet by mouth daily.  aspirin 81 MG EC tablet Take 81 mg by mouth daily. No current facility-administered medications on file prior to visit. Subjective:     Review of Systems   Constitutional: Positive for fatigue. Negative for appetite change and fever. HENT: Negative for congestion, ear pain, rhinorrhea and sore throat. Respiratory: Negative for cough, chest tightness and shortness of breath. Cardiovascular: Positive for leg swelling. Negative for chest pain and palpitations. Gastrointestinal: Negative for abdominal distention, abdominal pain, constipation, diarrhea, nausea and vomiting.         Occ heartburn   Genitourinary: Positive for difficulty urinating (incontinence - stable). Negative for dysuria. Musculoskeletal: Positive for gait problem. Negative for arthralgias, back pain and myalgias. Right upper arm pain   Skin: Negative for rash. Neurological: Positive for weakness (left side). Negative for dizziness, light-headedness and headaches. Hematological: Negative for adenopathy. Psychiatric/Behavioral: Positive for dysphoric mood. Negative for behavioral problems and sleep disturbance. The patient is not nervous/anxious (stable). Memory loss       Objective:     Physical Exam  Vitals reviewed. Constitutional:       General: She is not in acute distress. Appearance: She is well-developed. HENT:      Head: Normocephalic and atraumatic. Right Ear: External ear normal.      Left Ear: External ear normal.      Nose: Nose normal.      Mouth/Throat:      Pharynx: No oropharyngeal exudate. Eyes:      Conjunctiva/sclera: Conjunctivae normal.      Pupils: Pupils are equal, round, and reactive to light. Cardiovascular:      Rate and Rhythm: Normal rate. Rhythm irregular. Heart sounds: Normal heart sounds. No murmur heard. Pulmonary:      Effort: Pulmonary effort is normal. No respiratory distress. Breath sounds: Normal breath sounds. No wheezing. Chest:      Chest wall: No tenderness. Abdominal:      General: Bowel sounds are normal. There is no distension. Palpations: Abdomen is soft. There is no mass. Tenderness: There is no abdominal tenderness. Musculoskeletal:         General: No tenderness. Normal range of motion. Cervical back: Normal range of motion. Lymphadenopathy:      Cervical: No cervical adenopathy. Skin:     Findings: No rash. Neurological:      Mental Status: She is alert and oriented to person, place, and time. Deep Tendon Reflexes: Reflexes are normal and symmetric.    Psychiatric:         Behavior: Behavior normal.         Assessment:      Diagnosis Orders   1. Essential hypertension  CBC    Comprehensive Metabolic Panel   2. Pure hypercholesterolemia  Lipid Panel   3. Type 2 diabetes mellitus with complication, without long-term current use of insulin ([facility])  Comprehensive Metabolic Panel    Hemoglobin A1C   4. Stage 3b chronic kidney disease ([facility] [address]  Comprehensive Metabolic Panel   5. Iron deficiency anemia, unspecified iron deficiency anemia type  CBC   6. B12 deficiency  Vitamin B12   7. Hypothyroidism, unspecified type  TSH    T4, Free   8. Depression, unspecified depression type     9. Anxiety     10. Gastroesophageal reflux disease without esophagitis     11. Mild intermittent asthma without complication     12. Urge incontinence     13. Cognitive decline     14. Obstructive sleep apnea syndrome     15. History of CVA (cerebrovascular accident)     12. Chronic cystitis.            Plan:     Orders Placed This Encounter   Procedures    CBC     Standing Status:   Future     Standing Expiration Date:   7/5/2023    Comprehensive Metabolic Panel     Standing Status:   Future     Standing Expiration Date:   7/5/2023    Lipid Panel     Standing Status:   Future     Standing Expiration Date:   7/5/2023     Order Specific Question:   Is Patient Fasting?/# of Hours     Answer:   8-10 Hours    Hemoglobin A1C     Standing Status:   Future     Standing Expiration Date:   7/5/2023    TSH     Standing Status:   Future     Standing Expiration Date:   7/5/2023    T4, Free     Standing Status:   Future     Standing Expiration Date:   7/5/2023    Vitamin B12     Standing Status:   Future     Standing Expiration Date:   7/5/2023      Will cont with the Cardizem as prescribed for BP control    Will cont with low fat/chol diet and Lipitor as ordered    Continue with ADA diet, current diabetic meds, and monitoring BS's as instructed    Cont with daily foot exams and yearly eye exams    No Ace-I/ARB secondary to angioedema    Will cont with the Synthroid as prescribed as the thyroid levels are stable    Will cont with the Cymbalta 120mg daily    Stay well hydrated and no NSAID's    Pt stable on CPAP with her sleep apnea and has been using nightly and getting benefit from its use. Will cont with the Aricept 10mg daily     Will have her try OTC Voltaren Gel and Tylenol for her right upper arm pain, but no oral NSAID's    Rest of systems unchanged, continue current treatments. Medications, labs, diagnostic studies, consultations and follow-up as documented in this encounter. Rest of systems unchanged, continue current treatments    On this date July 5, 2022,  I have spent greater than 50% of visit reviewing previous notes, test results and face to face with the patient discussing the diagnoses, importance of compliance with the treatment plan, counseling, coordinating care as well as documenting on the day of the visit. Alina Denney MD

## 2022-09-23 ENCOUNTER — TELEMEDICINE (OUTPATIENT)
Dept: FAMILY MEDICINE CLINIC | Age: 87
End: 2022-09-23
Payer: COMMERCIAL

## 2022-09-23 ENCOUNTER — TELEPHONE (OUTPATIENT)
Dept: FAMILY MEDICINE CLINIC | Age: 87
End: 2022-09-23

## 2022-09-23 DIAGNOSIS — N30.00 ACUTE CYSTITIS WITHOUT HEMATURIA: Primary | ICD-10-CM

## 2022-09-23 PROCEDURE — 1123F ACP DISCUSS/DSCN MKR DOCD: CPT | Performed by: FAMILY MEDICINE

## 2022-09-23 PROCEDURE — 99213 OFFICE O/P EST LOW 20 MIN: CPT | Performed by: FAMILY MEDICINE

## 2022-09-23 RX ORDER — CIPROFLOXACIN 500 MG/1
500 TABLET, FILM COATED ORAL 2 TIMES DAILY
Qty: 14 TABLET | Refills: 0 | Status: SHIPPED | OUTPATIENT
Start: 2022-09-23 | End: 2022-09-30

## 2022-09-23 SDOH — ECONOMIC STABILITY: FOOD INSECURITY: WITHIN THE PAST 12 MONTHS, YOU WORRIED THAT YOUR FOOD WOULD RUN OUT BEFORE YOU GOT MONEY TO BUY MORE.: NEVER TRUE

## 2022-09-23 SDOH — ECONOMIC STABILITY: FOOD INSECURITY: WITHIN THE PAST 12 MONTHS, THE FOOD YOU BOUGHT JUST DIDN'T LAST AND YOU DIDN'T HAVE MONEY TO GET MORE.: NEVER TRUE

## 2022-09-23 ASSESSMENT — ENCOUNTER SYMPTOMS
SHORTNESS OF BREATH: 0
VOMITING: 0
RHINORRHEA: 0
DIARRHEA: 0
ABDOMINAL DISTENTION: 0
ABDOMINAL PAIN: 0
CONSTIPATION: 0
SORE THROAT: 0
CHEST TIGHTNESS: 0
BACK PAIN: 0
NAUSEA: 0
SINUS PAIN: 0
COUGH: 0

## 2022-09-23 ASSESSMENT — SOCIAL DETERMINANTS OF HEALTH (SDOH): HOW HARD IS IT FOR YOU TO PAY FOR THE VERY BASICS LIKE FOOD, HOUSING, MEDICAL CARE, AND HEATING?: NOT HARD AT ALL

## 2022-09-23 NOTE — TELEPHONE ENCOUNTER
Patient called requesting medication for UTI says she is having burning, urgency and frequency.  States she gets them frequently- if willing to send please send to [facility] [address]

## 2022-09-23 NOTE — PROGRESS NOTES
daily 90 tablet 3    cyanocobalamin 1000 MCG tablet Take 1,000 mcg by mouth every other day       budesonide-formoterol (SYMBICORT) 80-4.5 MCG/ACT AERO Inhale 2 puffs into the lungs 2 times daily (Patient taking differently: Inhale 2 puffs into the lungs 2 times daily as needed) 1 Inhaler 11    Respiratory Therapy Supplies [employer] PLEASE SUPPLY PATIENT WITH ALL NECESSARY SUPPLIES FOR HER CPAP MACHINE. 1 Device 0    Lancets MISC Indications: NO FURTHER REFILLS UNTIL SEEN IN THE OFFICE. Please dispense according to patients insurance/device. Patient testing weekly, and  each 0    blood glucose monitor strips Indications: NO FURTHER REFILLS UNTIL SEEN IN THE OFFICE. Please supply patient with test strip that insurance covers, tests weekly and PRN. 100 strip 0    polyethylene glycol (GLYCOLAX) packet Take 17 g by mouth daily as needed   0    cetirizine (ZYRTEC) 10 MG tablet Take 10 mg by mouth daily as needed. Multiple Vitamin (THERA) TABS Take 1 tablet by mouth daily. aspirin 81 MG EC tablet Take 81 mg by mouth daily. No current facility-administered medications on file prior to visit. Subjective:     Review of Systems   Constitutional:  Negative for appetite change, chills, fatigue and fever. HENT:  Negative for congestion, ear pain, rhinorrhea, sinus pain and sore throat. Eyes:  Negative for visual disturbance. Respiratory:  Negative for cough, chest tightness and shortness of breath. Cardiovascular:  Negative for chest pain and palpitations. Gastrointestinal:  Negative for abdominal distention, abdominal pain, constipation, diarrhea, nausea and vomiting. Genitourinary:  Positive for dysuria, frequency and urgency. Negative for difficulty urinating and flank pain. Musculoskeletal:  Negative for arthralgias, back pain, myalgias and neck pain. Skin:  Negative for rash. Neurological:  Negative for dizziness, weakness, light-headedness, numbness and headaches. Hematological:  Negative for adenopathy. Psychiatric/Behavioral:  Negative for behavioral problems, dysphoric mood and sleep disturbance. The patient is not nervous/anxious. Objective:     Physical Exam  Vitals reviewed: Vital signs unavailable, as this is a virtual visit. Constitutional:       General: She is not in acute distress. Appearance: Normal appearance. She is not ill-appearing or toxic-appearing. Pulmonary:      Effort: Pulmonary effort is normal. No tachypnea, accessory muscle usage or respiratory distress. Comments: Patient able to talk in full sentences without difficulty   Neurological:      General: No focal deficit present. Mental Status: She is alert and oriented to person, place, and time. Psychiatric:         Mood and Affect: Mood normal.         Speech: Speech normal.         Behavior: Behavior normal. Behavior is cooperative. Assessment:      Diagnosis Orders   1. Acute cystitis without hematuria  ciprofloxacin (CIPRO) 500 MG tablet          Plan:      Orders Placed This Encounter   Medications    ciprofloxacin (CIPRO) 500 MG tablet     Sig: Take 1 tablet by mouth 2 times daily for 7 days     Dispense:  14 tablet     Refill:  0      Will start the Cipro as prescribed    Increase fluids and cranberry juice    Pt instructed to call back if no improvement or worsening of symptoms     Rest of systems unchanged, continue current treatments. Medications, labs, diagnostic studies, consultations and follow-up as documented in this encounter. Rest of systems unchanged, continue current treatments    On this date September 23, 2022,  I have spent greater than 50% of this visit reviewing previous notes, test results and face to face with the patient discussing the diagnoses, importance of compliance with the treatment plan, counseling, coordinating care as well as documenting on the day of the visit.     Zehra Nunez is a 82 y.o. female being evaluated by a Virtual Visit (video visit) encounter to address concerns as mentioned above. A caregiver was present when appropriate. Due to this being a TeleHealth encounter (During LATYZ-18 public health emergency), evaluation of the following organ systems was limited: Vitals/Constitutional/EENT/Resp/CV/GI//MS/Neuro/Skin/Heme-Lymph-Imm. Pursuant to the emergency declaration under the [address] [place] [address] and the [employer] Act, this Virtual Visit was conducted with patient's (and/or legal guardian's) consent, to reduce the patient's risk of exposure to COVID-19 and provide necessary medical care. The patient (and/or legal guardian) has also been advised to contact this office for worsening conditions or problems, and seek emergency medical treatment and/or call 911 if deemed necessary. Patient identification was verified at the start of the visit: Yes    Total time spent for this encounter: Not billed by time    Services were provided through a video synchronous discussion virtually to substitute for in-person clinic visit. Patient and provider were located at their individual homes. --Chai Marcos MD on 9/23/2022 at 1:45 PM    An electronic signature was used to authenticate this note. Alina Harman MD

## 2022-09-30 ENCOUNTER — HOSPITAL ENCOUNTER (OUTPATIENT)
Age: 87
Setting detail: SPECIMEN
Discharge: HOME | End: 2022-09-30

## 2022-09-30 LAB
ANION GAP SERPL CALCULATED.3IONS-SCNC: 12 MMOL/L (ref 9–17)
BUN BLDV-MCNC: 27 MG/DL (ref 8–23)
CALCIUM SERPL-MCNC: 9 MG/DL (ref 8.6–10.4)
CHLORIDE BLD-SCNC: 104 MMOL/L (ref 98–107)
CO2: 25 MMOL/L (ref 20–31)
CREAT SERPL-MCNC: 0.97 MG/DL (ref 0.5–0.9)
GFR AFRICAN AMERICAN: >60 ML/MIN
GFR NON-AFRICAN AMERICAN: 55 ML/MIN
GFR SERPL CREATININE-BSD FRML MDRD: ABNORMAL ML/MIN/{1.73_M2}
GLUCOSE BLD-MCNC: 136 MG/DL (ref 70–99)
HCT VFR BLD CALC: 38.3 % (ref 36.3–47.1)
HEMOGLOBIN: 12.2 G/DL (ref 11.9–15.1)
MCH RBC QN AUTO: 28.8 PG (ref 25.2–33.5)
MCHC RBC AUTO-ENTMCNC: 31.9 G/DL (ref 28.4–34.8)
MCV RBC AUTO: 90.3 FL (ref 82.6–102.9)
NRBC AUTOMATED: 0 PER 100 WBC
PDW BLD-RTO: 13.9 % (ref 11.8–14.4)
PLATELET # BLD: 362 K/UL (ref 138–453)
PMV BLD AUTO: 9.5 FL (ref 8.1–13.5)
POTASSIUM SERPL-SCNC: 3.9 MMOL/L (ref 3.7–5.3)
RBC # BLD: 4.24 M/UL (ref 3.95–5.11)
SODIUM BLD-SCNC: 141 MMOL/L (ref 135–144)
TSH SERPL DL<=0.05 MIU/L-ACNC: 2.59 UIU/ML (ref 0.3–5)
WBC # BLD: 10.3 K/UL (ref 3.5–11.3)

## 2022-09-30 PROCEDURE — 84443 ASSAY THYROID STIM HORMONE: CPT

## 2022-09-30 PROCEDURE — P9603 ONE-WAY ALLOW PRORATED MILES: HCPCS

## 2022-09-30 PROCEDURE — 36415 COLL VENOUS BLD VENIPUNCTURE: CPT

## 2022-09-30 PROCEDURE — 85027 COMPLETE CBC AUTOMATED: CPT

## 2022-09-30 PROCEDURE — 80048 BASIC METABOLIC PNL TOTAL CA: CPT

## 2022-10-07 ENCOUNTER — HOSPITAL ENCOUNTER (OUTPATIENT)
Age: 87
Setting detail: SPECIMEN
Discharge: HOME | End: 2022-10-07

## 2022-10-07 LAB
ANION GAP SERPL CALCULATED.3IONS-SCNC: 14 MMOL/L (ref 9–17)
BUN BLDV-MCNC: 20 MG/DL (ref 8–23)
CALCIUM SERPL-MCNC: 9.2 MG/DL (ref 8.6–10.4)
CHLORIDE BLD-SCNC: 100 MMOL/L (ref 98–107)
CO2: 23 MMOL/L (ref 20–31)
CREAT SERPL-MCNC: 1.02 MG/DL (ref 0.5–0.9)
GFR SERPL CREATININE-BSD FRML MDRD: 54 ML/MIN/1.73M2
GLUCOSE BLD-MCNC: 206 MG/DL (ref 70–99)
HCT VFR BLD CALC: 38.3 % (ref 36.3–47.1)
HEMOGLOBIN: 12 G/DL (ref 11.9–15.1)
MCH RBC QN AUTO: 28.6 PG (ref 25.2–33.5)
MCHC RBC AUTO-ENTMCNC: 31.3 G/DL (ref 28.4–34.8)
MCV RBC AUTO: 91.4 FL (ref 82.6–102.9)
NRBC AUTOMATED: 0 PER 100 WBC
PDW BLD-RTO: 14.2 % (ref 11.8–14.4)
PLATELET # BLD: 355 K/UL (ref 138–453)
PMV BLD AUTO: 9.6 FL (ref 8.1–13.5)
POTASSIUM SERPL-SCNC: 4 MMOL/L (ref 3.7–5.3)
RBC # BLD: 4.19 M/UL (ref 3.95–5.11)
SODIUM BLD-SCNC: 137 MMOL/L (ref 135–144)
WBC # BLD: 10.8 K/UL (ref 3.5–11.3)

## 2022-10-07 PROCEDURE — 85027 COMPLETE CBC AUTOMATED: CPT

## 2022-10-07 PROCEDURE — 36415 COLL VENOUS BLD VENIPUNCTURE: CPT

## 2022-10-07 PROCEDURE — P9603 ONE-WAY ALLOW PRORATED MILES: HCPCS

## 2022-10-07 PROCEDURE — 80048 BASIC METABOLIC PNL TOTAL CA: CPT

## 2022-10-13 ENCOUNTER — HOSPITAL ENCOUNTER (OUTPATIENT)
Age: 87
Setting detail: SPECIMEN
Discharge: HOME | End: 2022-10-13

## 2022-10-13 LAB
BACTERIA: ABNORMAL
BILIRUBIN URINE: NEGATIVE
CASTS UA: ABNORMAL /LPF (ref 0–8)
COLOR: YELLOW
EPITHELIAL CELLS UA: ABNORMAL /HPF (ref 0–5)
GLUCOSE URINE: NEGATIVE
KETONES, URINE: ABNORMAL
LEUKOCYTE ESTERASE, URINE: ABNORMAL
NITRITE, URINE: NEGATIVE
PH UA: 6 (ref 5–8)
PROTEIN UA: ABNORMAL
RBC UA: ABNORMAL /HPF (ref 0–4)
SPECIFIC GRAVITY UA: 1.02 (ref 1–1.03)
TURBIDITY: ABNORMAL
URINE HGB: ABNORMAL
UROBILINOGEN, URINE: NORMAL
WBC UA: ABNORMAL /HPF (ref 0–5)

## 2022-10-13 PROCEDURE — 87184 SC STD DISK METHOD PER PLATE: CPT

## 2022-10-13 PROCEDURE — 87186 SC STD MICRODIL/AGAR DIL: CPT

## 2022-10-13 PROCEDURE — 87077 CULTURE AEROBIC IDENTIFY: CPT

## 2022-10-13 PROCEDURE — 87086 URINE CULTURE/COLONY COUNT: CPT

## 2022-10-13 PROCEDURE — 81001 URINALYSIS AUTO W/SCOPE: CPT

## 2022-10-14 ENCOUNTER — HOSPITAL ENCOUNTER (OUTPATIENT)
Age: 87
Setting detail: SPECIMEN
Discharge: HOME | End: 2022-10-14

## 2022-10-14 LAB
ANION GAP SERPL CALCULATED.3IONS-SCNC: 16 MMOL/L (ref 9–17)
BUN BLDV-MCNC: 15 MG/DL (ref 8–23)
CALCIUM SERPL-MCNC: 9.7 MG/DL (ref 8.6–10.4)
CHLORIDE BLD-SCNC: 100 MMOL/L (ref 98–107)
CO2: 20 MMOL/L (ref 20–31)
CREAT SERPL-MCNC: 0.8 MG/DL (ref 0.5–0.9)
GFR SERPL CREATININE-BSD FRML MDRD: >60 ML/MIN/1.73M2
GLUCOSE BLD-MCNC: 172 MG/DL (ref 70–99)
HCT VFR BLD CALC: 40.5 % (ref 36.3–47.1)
HEMOGLOBIN: 13 G/DL (ref 11.9–15.1)
MCH RBC QN AUTO: 28.8 PG (ref 25.2–33.5)
MCHC RBC AUTO-ENTMCNC: 32.1 G/DL (ref 28.4–34.8)
MCV RBC AUTO: 89.8 FL (ref 82.6–102.9)
NRBC AUTOMATED: 0 PER 100 WBC
PDW BLD-RTO: 13.7 % (ref 11.8–14.4)
PLATELET # BLD: 482 K/UL (ref 138–453)
PMV BLD AUTO: 9.4 FL (ref 8.1–13.5)
POTASSIUM SERPL-SCNC: 4.1 MMOL/L (ref 3.7–5.3)
RBC # BLD: 4.51 M/UL (ref 3.95–5.11)
SODIUM BLD-SCNC: 136 MMOL/L (ref 135–144)
WBC # BLD: 11.2 K/UL (ref 3.5–11.3)

## 2022-10-14 PROCEDURE — 80048 BASIC METABOLIC PNL TOTAL CA: CPT

## 2022-10-14 PROCEDURE — P9603 ONE-WAY ALLOW PRORATED MILES: HCPCS

## 2022-10-14 PROCEDURE — 36415 COLL VENOUS BLD VENIPUNCTURE: CPT

## 2022-10-14 PROCEDURE — 85027 COMPLETE CBC AUTOMATED: CPT

## 2022-10-16 LAB
CULTURE: ABNORMAL
SPECIMEN DESCRIPTION: ABNORMAL

## 2022-10-19 ENCOUNTER — HOSPITAL ENCOUNTER (OUTPATIENT)
Age: 87
Setting detail: SPECIMEN
Discharge: HOME | End: 2022-10-19

## 2022-10-19 LAB
ANION GAP SERPL CALCULATED.3IONS-SCNC: 14 MMOL/L (ref 9–17)
BUN BLDV-MCNC: 20 MG/DL (ref 8–23)
CALCIUM SERPL-MCNC: 9.3 MG/DL (ref 8.6–10.4)
CHLORIDE BLD-SCNC: 100 MMOL/L (ref 98–107)
CO2: 21 MMOL/L (ref 20–31)
CREAT SERPL-MCNC: 0.98 MG/DL (ref 0.5–0.9)
GFR SERPL CREATININE-BSD FRML MDRD: 57 ML/MIN/1.73M2
GLUCOSE BLD-MCNC: 155 MG/DL (ref 70–99)
HCT VFR BLD CALC: 37.1 % (ref 36.3–47.1)
HEMOGLOBIN: 11.2 G/DL (ref 11.9–15.1)
MCH RBC QN AUTO: 28.6 PG (ref 25.2–33.5)
MCHC RBC AUTO-ENTMCNC: 30.2 G/DL (ref 28.4–34.8)
MCV RBC AUTO: 94.6 FL (ref 82.6–102.9)
NRBC AUTOMATED: 0 PER 100 WBC
PDW BLD-RTO: 13.7 % (ref 11.8–14.4)
PLATELET # BLD: 446 K/UL (ref 138–453)
PMV BLD AUTO: 9.6 FL (ref 8.1–13.5)
POTASSIUM SERPL-SCNC: 3.8 MMOL/L (ref 3.7–5.3)
RBC # BLD: 3.92 M/UL (ref 3.95–5.11)
SODIUM BLD-SCNC: 135 MMOL/L (ref 135–144)
WBC # BLD: 8.9 K/UL (ref 3.5–11.3)

## 2022-10-19 PROCEDURE — 80048 BASIC METABOLIC PNL TOTAL CA: CPT

## 2022-10-19 PROCEDURE — 85027 COMPLETE CBC AUTOMATED: CPT

## 2022-10-19 PROCEDURE — 36415 COLL VENOUS BLD VENIPUNCTURE: CPT

## 2022-10-19 PROCEDURE — P9603 ONE-WAY ALLOW PRORATED MILES: HCPCS

## 2022-10-31 NOTE — PROGRESS NOTES
Review of Systems   Constitutional: Positive for fatigue. Negative for chills and fever. Eyes: Negative for pain, redness and visual disturbance. Respiratory: Negative for cough, shortness of breath and wheezing. Cardiovascular: Positive for leg swelling. Negative for chest pain. Gastrointestinal: Negative for abdominal pain, constipation, diarrhea, nausea and vomiting. Genitourinary: Positive for frequency. Negative for difficulty urinating, dysuria, flank pain, hematuria and urgency. Musculoskeletal: Negative for back pain, joint swelling and myalgias. Skin: Negative for rash and wound. Neurological: Positive for dizziness. Negative for tremors and numbness. Hematological: Does not bruise/bleed easily. Review of Systems PT has Catheter   Constitutional: Negative for chills, fatigue and fever. Eyes: Negative for pain, redness and visual disturbance. Respiratory: Negative for cough, shortness of breath and wheezing. Cardiovascular: Negative for chest pain and leg swelling. Gastrointestinal: Negative for abdominal pain, constipation, diarrhea, nausea and vomiting. Genitourinary: Negative for difficulty urinating, dysuria, flank pain, frequency, hematuria, scrotal swelling, testicular pain and urgency. Musculoskeletal: Negative for back pain, joint swelling and myalgias. Skin: Negative for rash and wound. Neurological: Negative for dizziness, tremors and numbness. Hematological: Does not bruise/bleed easily. no

## 2022-12-09 ENCOUNTER — TELEPHONE (OUTPATIENT)
Dept: FAMILY MEDICINE CLINIC | Age: 87
End: 2022-12-09

## 2022-12-09 NOTE — TELEPHONE ENCOUNTER
Patient daughter called states patient needs a new order for CPAP supplies sent to [facility].

## 2022-12-09 NOTE — TELEPHONE ENCOUNTER
I left a VM at [employer] Asking for an order for the patients CPAP supplies to be faxed to the office.

## 2022-12-13 NOTE — TELEPHONE ENCOUNTER
Per patients daughter Alanna Vaughn the patient was admitted to [facility] and will be under the care of Dr. Lyle Gómez.

## 2022-12-13 NOTE — TELEPHONE ENCOUNTER
I spoke with the patients daughter who said she thinks the patient is going to just follow with the doctor at [facility] and if they change their mind she will call back. She is going to discuss the need for CPAP supplies with the person out there who takes care of the CPAP in the [facility].

## 2022-12-16 ENCOUNTER — OUTSIDE SERVICES (OUTPATIENT)
Dept: PRIMARY CARE CLINIC | Age: 87
End: 2022-12-16

## 2022-12-16 DIAGNOSIS — M62.81 LEFT-SIDED MUSCLE WEAKNESS: ICD-10-CM

## 2022-12-16 DIAGNOSIS — E03.9 HYPOTHYROIDISM, UNSPECIFIED TYPE: ICD-10-CM

## 2022-12-16 DIAGNOSIS — Z86.73 HISTORY OF CVA (CEREBROVASCULAR ACCIDENT): Primary | ICD-10-CM

## 2022-12-17 ASSESSMENT — ENCOUNTER SYMPTOMS
COUGH: 0
SHORTNESS OF BREATH: 0
EYE DISCHARGE: 0
NAUSEA: 0
ABDOMINAL PAIN: 0
SORE THROAT: 0
RHINORRHEA: 0
DIARRHEA: 0
VOMITING: 0
WHEEZING: 0
EYE REDNESS: 0

## 2022-12-18 NOTE — PROGRESS NOTES
Jacob Beal is a 82 y.o. female being seen for her  admission  follow up. Location of visit: [facility] [place]    Visit Date:  12/16/2022    Reason for Visit:    Chief Complaint   Patient presents with    Extremity Weakness        Extremity Weakness  Associated symptoms include weakness. Pertinent negatives include no abdominal pain, arthralgias, chest pain, chills, coughing, fever, headaches, myalgias, nausea, sore throat or vomiting. Recently admitted to Assisted Living. At times she is a one assist for transfers and to the toilet. However at night time she needs more assistance and staff reports she required 3 assist to help out of bed. She had a CVA about 15 years ago. She has left sided weakness. She is alert and oriented  She is forgetful. She relates no concerns today. Review of Systems   Constitutional:  Negative for chills and fever. HENT:  Negative for rhinorrhea and sore throat. Eyes:  Negative for discharge and redness. Respiratory:  Negative for cough, shortness of breath and wheezing. Cardiovascular:  Negative for chest pain and palpitations. Gastrointestinal:  Negative for abdominal pain, diarrhea, nausea and vomiting. Genitourinary:  Negative for dysuria and frequency. Musculoskeletal:  Positive for gait problem. Negative for arthralgias and myalgias. Neurological:  Positive for weakness. Negative for dizziness, light-headedness and headaches. Psychiatric/Behavioral:  Negative for dysphoric mood and sleep disturbance. The patient is not nervous/anxious. Physical Exam  Vitals and nursing note reviewed. Constitutional:       Appearance: She is obese. HENT:      Head: Normocephalic and atraumatic. Right Ear: External ear normal.      Left Ear: External ear normal.   Eyes:      Extraocular Movements: Extraocular movements intact. Pupils: Pupils are equal, round, and reactive to light.    Cardiovascular:      Rate and Rhythm: Normal rate and regular rhythm. Heart sounds: Normal heart sounds. Pulmonary:      Breath sounds: Normal breath sounds. Abdominal:      General: Bowel sounds are normal.      Palpations: Abdomen is soft. Musculoskeletal:      Right lower leg: No edema. Left lower leg: Edema present. Comments: Left sided weakness with some movement and muscle tone of left lower extremity. Minimal movement in left upper extremity. Mild left ankle edema. Skin:     General: Skin is warm and dry. Neurological:      Mental Status: She is alert and oriented to person, place, and time. Motor: Weakness present. Gait: Gait abnormal.   Psychiatric:         Mood and Affect: Mood normal.         Behavior: Behavior normal.         Thought Content: Thought content normal.        Allergies   Allergen Reactions    Pcn [Penicillins] Shortness Of Breath    Demerol     Effexor [Venlafaxine] Other (See Comments)     Hallucinations     Lisinopril      Possible angioedema seen as supraglottitis    Macrobid [Nitrofurantoin Monohydrate Macrocrystals]      Wheezing, tight chest        Past Medical History:   Diagnosis Date    Diverticulosis     Hyperlipidemia     Hypertension     Hypokalemia     Iron deficiency anemia     Menopause     s/p hysterectomy    Osteoarthritis     bilat hands, hips, knees    S/P partial colectomy 2/2002    Stroke ([facility] [address]     Thrombocytosis     Type II or unspecified type diabetes mellitus without mention of complication, not stated as uncontrolled 02/2007    Unspecified sleep apnea 08/2004    uses bipap    UTI (lower urinary tract infection)     re-curring        ASSESSMENT:       Diagnosis Orders   1. History of CVA (cerebrovascular accident)        2. Left-sided muscle weakness        3. Hypothyroidism, unspecified type             Plan: 1. Labs: CBC with diff., CMP, TSH, Free T4 - yearly - draw yearly  2. Continue current medications  3.  Encourage to participate in activities

## 2023-02-07 ENCOUNTER — OUTSIDE SERVICES (OUTPATIENT)
Dept: PRIMARY CARE CLINIC | Age: 88
End: 2023-02-07

## 2023-02-07 DIAGNOSIS — Z86.73 HISTORY OF CVA (CEREBROVASCULAR ACCIDENT): ICD-10-CM

## 2023-02-07 DIAGNOSIS — I10 ESSENTIAL HYPERTENSION: ICD-10-CM

## 2023-02-07 DIAGNOSIS — R29.898 WEAKNESS OF LEFT LEG: ICD-10-CM

## 2023-02-07 ASSESSMENT — ENCOUNTER SYMPTOMS
COUGH: 0
SHORTNESS OF BREATH: 0
VOMITING: 0
DIARRHEA: 0
NAUSEA: 0

## 2023-02-08 NOTE — ASSESSMENT & PLAN NOTE
post CVA- tripped on bathmat and had a fall into the shower. would benefit from home care with therapy to work on strengthening and balance.

## 2023-02-08 NOTE — PROGRESS NOTES
Griffin Mei is a 82 y.o. female being seen for her  requested face to face  follow up. Location of visit: [facility] [place]    HPI:  New today:Pt had a fall in the bathroom recently. No injuries noted. No c/o dizziness. Review of Systems   Constitutional:  Negative for activity change, appetite change, chills, diaphoresis and fever. HENT:  Negative for congestion. Respiratory:  Negative for cough and shortness of breath. Cardiovascular:  Negative for chest pain and palpitations. Gastrointestinal:  Negative for diarrhea, nausea and vomiting. Genitourinary:  Negative for hematuria. Musculoskeletal:  Negative for arthralgias and myalgias. Skin:  Negative for rash and wound. Neurological:  Negative for weakness and headaches. Psychiatric/Behavioral:  Negative for agitation, dysphoric mood and sleep disturbance. The patient is not nervous/anxious. Physical Exam  Vitals and nursing note reviewed. Constitutional:       Appearance: Normal appearance. HENT:      Head: Normocephalic and atraumatic. Eyes:      General: No scleral icterus. Right eye: No discharge. Conjunctiva/sclera: Conjunctivae normal.   Pulmonary:      Effort: Pulmonary effort is normal. No respiratory distress. Skin:     Coloration: Skin is not jaundiced or pale. Neurological:      General: No focal deficit present. Mental Status: She is alert. Psychiatric:         Mood and Affect: Mood normal.         Behavior: Behavior normal.         Thought Content: Thought content normal.        ASSESSMENT:   Diagnosis Orders   1. Essential hypertension        2. Weakness of left leg        3. History of CVA (cerebrovascular accident)            PLAN:    1. Essential hypertension  Assessment & Plan:   Well-controlled, continue current medications  2. Weakness of left leg  Assessment & Plan:   post CVA- tripped on bathmat and had a fall into the shower.   would benefit from home care with therapy to work on strengthening and balance.     3. History of CVA (cerebrovascular accident)  Assessment & Plan:   as above

## 2023-03-17 ENCOUNTER — OUTSIDE SERVICES (OUTPATIENT)
Dept: PRIMARY CARE CLINIC | Age: 88
End: 2023-03-17

## 2023-03-17 DIAGNOSIS — L30.9 DERMATITIS: Primary | ICD-10-CM

## 2023-03-17 DIAGNOSIS — Z86.73 HISTORY OF CVA (CEREBROVASCULAR ACCIDENT): ICD-10-CM

## 2023-03-17 DIAGNOSIS — F41.9 ANXIETY: ICD-10-CM

## 2023-03-19 ASSESSMENT — ENCOUNTER SYMPTOMS
COUGH: 0
SHORTNESS OF BREATH: 0
SINUS PAIN: 0
DIARRHEA: 0
CONSTIPATION: 0
NAUSEA: 0
SINUS PRESSURE: 0

## 2023-03-19 NOTE — PROGRESS NOTES
Paulette Watters is a 82 y.o. female being seen for her monthly follow up. Location of visit: [facility] [place]    Visit Date:  3/17/2023    Reason for Visit:    Chief Complaint   Patient presents with    Rash      Rash  Pertinent negatives include no congestion, cough, diarrhea, fatigue, fever or shortness of breath. Resident asked to be seen due to rash on left shoulder. She states it has been there about a week. It itches. She can not see it but she can feel it. She states she has some anxiety. She had anxiety and depression at home and she took Ativan when it needed. She states the is a little depressed about being in assisted living but understands why she needs to be in assisted living. She know she has some residual weakness from CVA. Review of Systems   Constitutional:  Negative for chills, fatigue and fever. HENT:  Negative for congestion, sinus pressure and sinus pain. Respiratory:  Negative for cough and shortness of breath. Cardiovascular:  Negative for chest pain, palpitations and leg swelling. Gastrointestinal:  Negative for constipation, diarrhea and nausea. Genitourinary:  Negative for difficulty urinating and dysuria. Musculoskeletal:  Positive for arthralgias. Skin:  Positive for rash. Neurological:  Negative for dizziness, light-headedness and headaches. Psychiatric/Behavioral:  Positive for dysphoric mood and sleep disturbance. The patient is nervous/anxious. Physical Exam  Vitals and nursing note reviewed. Constitutional:       Appearance: She is obese. HENT:      Head: Normocephalic and atraumatic. Right Ear: External ear normal.      Left Ear: External ear normal.   Cardiovascular:      Rate and Rhythm: Normal rate and regular rhythm. Heart sounds: Normal heart sounds. Pulmonary:      Breath sounds: Normal breath sounds. Abdominal:      General: Bowel sounds are normal.      Palpations: Abdomen is soft.    Skin:     Findings: Erythema and rash present. Rash is urticarial.      Comments: Petechial erythema rash left shoulder    Neurological:      Mental Status: She is alert and oriented to person, place, and time. Motor: Weakness present. Psychiatric:         Mood and Affect: Mood normal.         Behavior: Behavior normal.         Thought Content: Thought content normal.        Allergies   Allergen Reactions    Pcn [Penicillins] Shortness Of Breath    Demerol     Effexor [Venlafaxine] Other (See Comments)     Hallucinations     Lisinopril      Possible angioedema seen as supraglottitis    Macrobid [Nitrofurantoin Monohydrate Macrocrystals]      Wheezing, tight chest        Past Medical History:   Diagnosis Date    Diverticulosis     Hyperlipidemia     Hypertension     Hypokalemia     Iron deficiency anemia     Menopause     s/p hysterectomy    Osteoarthritis     bilat hands, hips, knees    S/P partial colectomy 2/2002    Stroke ([facility] [address]     Thrombocytosis     Type II or unspecified type diabetes mellitus without mention of complication, not stated as uncontrolled 02/2007    Unspecified sleep apnea 08/2004    uses bipap    UTI (lower urinary tract infection)     re-curring      ASSESSMENT:     Diagnosis Orders   1. Dermatitis        2. Anxiety        3.  History of CVA (cerebrovascular accident)           Plan:  Ativan 0.5 mg PO BID PRN anxiety/depression  Triamcinolone 0.1% ointment - apply topically to left shoulder BID X 7 days then BID PRN rash

## 2023-04-28 ENCOUNTER — OUTSIDE SERVICES (OUTPATIENT)
Dept: PRIMARY CARE CLINIC | Age: 88
End: 2023-04-28

## 2023-04-28 DIAGNOSIS — Z86.73 HISTORY OF CVA (CEREBROVASCULAR ACCIDENT): ICD-10-CM

## 2023-04-28 DIAGNOSIS — R35.0 FREQUENT URINATION: ICD-10-CM

## 2023-04-28 DIAGNOSIS — E11.8 TYPE 2 DIABETES MELLITUS WITH COMPLICATION, WITHOUT LONG-TERM CURRENT USE OF INSULIN (HCC): Primary | ICD-10-CM

## 2023-04-29 ASSESSMENT — ENCOUNTER SYMPTOMS
BACK PAIN: 0
COUGH: 0
DIARRHEA: 0
CONSTIPATION: 0
SHORTNESS OF BREATH: 0

## 2023-04-29 NOTE — PROGRESS NOTES
Breath sounds: Normal breath sounds. Abdominal:      General: Bowel sounds are normal.      Palpations: Abdomen is soft. Musculoskeletal:      Right lower leg: No edema. Left lower leg: Edema present. Comments: Left sided weakness   Skin:     General: Skin is warm and dry. Neurological:      Mental Status: She is alert. Gait: Gait abnormal.   Psychiatric:         Mood and Affect: Mood normal.         Behavior: Behavior normal.        Allergies   Allergen Reactions    Pcn [Penicillins] Shortness Of Breath    Demerol     Effexor [Venlafaxine] Other (See Comments)     Hallucinations     Lisinopril      Possible angioedema seen as supraglottitis    Macrobid [Nitrofurantoin Monohydrate Macrocrystals]      Wheezing, tight chest        Past Medical History:   Diagnosis Date    Diverticulosis     Hyperlipidemia     Hypertension     Hypokalemia     Iron deficiency anemia     Menopause     s/p hysterectomy    Osteoarthritis     bilat hands, hips, knees    S/P partial colectomy 2/2002    Stroke ([facility] [address]     Thrombocytosis     Type II or unspecified type diabetes mellitus without mention of complication, not stated as uncontrolled 02/2007    Unspecified sleep apnea 08/2004    uses bipap    UTI (lower urinary tract infection)     re-curring      ASSESSMENT:     Diagnosis Orders   1. Type 2 diabetes mellitus with complication, without long-term current use of insulin ([address]        2. Frequent urination        3.  History of CVA (cerebrovascular accident)             Plan:  Metformin 1000 mg ER PO QPM  Trulicity 1.59 mg SC Q week  X 1 then Trulicity 1.5 mg SC Q week X 4 weeks then Trulicity 3.0 mg SC Q week   Urinalysis with reflex to culture

## 2023-07-07 ENCOUNTER — OUTSIDE SERVICES (OUTPATIENT)
Dept: PRIMARY CARE CLINIC | Age: 88
End: 2023-07-07

## 2023-07-07 DIAGNOSIS — R53.83 OTHER FATIGUE: ICD-10-CM

## 2023-07-07 DIAGNOSIS — E03.9 HYPOTHYROIDISM, UNSPECIFIED TYPE: ICD-10-CM

## 2023-07-07 DIAGNOSIS — I10 ESSENTIAL HYPERTENSION: Primary | ICD-10-CM

## 2023-07-08 ASSESSMENT — ENCOUNTER SYMPTOMS
CONSTIPATION: 0
ABDOMINAL PAIN: 0
DIARRHEA: 0
COUGH: 0
FACIAL SWELLING: 0
SHORTNESS OF BREATH: 0
VOMITING: 1
NAUSEA: 1
BACK PAIN: 0

## 2023-07-08 NOTE — PROGRESS NOTES
Lisa Montana is a 82 y.o. female being seen for her  requested  follow up. Location of visit: [facility] [place]    Visit Date:  7/7/2023    Reason for Visit:    Chief Complaint   Patient presents with    Emesis    Hypertension        Emesis   Pertinent negatives include no abdominal pain, chest pain, chills, coughing, diarrhea or headaches. Hypertension  Pertinent negatives include no chest pain, headaches or shortness of breath. Asked to see resident due to elevated blood pressure, emesis and fatigue. She has emesis earlier in the week and some nausea today. She has been sleeping a lot. Her blood pressure was high this morning but closer to normal in the afternoon. She feels weak. Review of Systems   Constitutional:  Positive for activity change and fatigue. Negative for chills. HENT:  Negative for congestion, ear pain and facial swelling. Respiratory:  Negative for cough and shortness of breath. Cardiovascular:  Negative for chest pain and leg swelling. Gastrointestinal:  Positive for nausea and vomiting. Negative for abdominal pain, constipation and diarrhea. Genitourinary:  Negative for difficulty urinating and dysuria. Musculoskeletal:  Positive for gait problem. Negative for back pain. Skin:  Negative for rash and wound. Neurological:  Negative for light-headedness and headaches. Psychiatric/Behavioral:  Negative for dysphoric mood and sleep disturbance. The patient is not nervous/anxious. Physical Exam  Vitals and nursing note reviewed. Constitutional:       Appearance: Normal appearance. She is obese. HENT:      Head: Normocephalic and atraumatic. Cardiovascular:      Rate and Rhythm: Normal rate and regular rhythm. Heart sounds: Normal heart sounds. Pulmonary:      Effort: Pulmonary effort is normal.      Breath sounds: Normal breath sounds. Abdominal:      General: Bowel sounds are normal.      Palpations: Abdomen is soft.    Neurological:

## 2023-09-29 ENCOUNTER — OUTSIDE SERVICES (OUTPATIENT)
Dept: PRIMARY CARE CLINIC | Age: 88
End: 2023-09-29

## 2023-09-29 DIAGNOSIS — E11.8 TYPE 2 DIABETES MELLITUS WITH COMPLICATION, WITHOUT LONG-TERM CURRENT USE OF INSULIN (HCC): ICD-10-CM

## 2023-09-29 DIAGNOSIS — Z00.00 MEDICARE ANNUAL WELLNESS VISIT, SUBSEQUENT: Primary | ICD-10-CM

## 2023-09-29 DIAGNOSIS — Z86.73 HISTORY OF CVA (CEREBROVASCULAR ACCIDENT): ICD-10-CM

## 2023-09-29 DIAGNOSIS — Z00.8 ENCOUNTER FOR OTHER GENERAL EXAMINATION: ICD-10-CM

## 2023-10-01 VITALS
HEIGHT: 63 IN | HEART RATE: 85 BPM | TEMPERATURE: 97.9 F | OXYGEN SATURATION: 95 % | RESPIRATION RATE: 18 BRPM | DIASTOLIC BLOOD PRESSURE: 86 MMHG | BODY MASS INDEX: 31.01 KG/M2 | WEIGHT: 175 LBS | SYSTOLIC BLOOD PRESSURE: 133 MMHG

## 2023-10-01 ASSESSMENT — PATIENT HEALTH QUESTIONNAIRE - PHQ9
5. POOR APPETITE OR OVEREATING: 0
SUM OF ALL RESPONSES TO PHQ QUESTIONS 1-9: 6
9. THOUGHTS THAT YOU WOULD BE BETTER OFF DEAD, OR OF HURTING YOURSELF: 1
6. FEELING BAD ABOUT YOURSELF - OR THAT YOU ARE A FAILURE OR HAVE LET YOURSELF OR YOUR FAMILY DOWN: 1
SUM OF ALL RESPONSES TO PHQ9 QUESTIONS 1 & 2: 3
8. MOVING OR SPEAKING SO SLOWLY THAT OTHER PEOPLE COULD HAVE NOTICED. OR THE OPPOSITE, BEING SO FIGETY OR RESTLESS THAT YOU HAVE BEEN MOVING AROUND A LOT MORE THAN USUAL: 0
SUM OF ALL RESPONSES TO PHQ QUESTIONS 1-9: 7
2. FEELING DOWN, DEPRESSED OR HOPELESS: 2
10. IF YOU CHECKED OFF ANY PROBLEMS, HOW DIFFICULT HAVE THESE PROBLEMS MADE IT FOR YOU TO DO YOUR WORK, TAKE CARE OF THINGS AT HOME, OR GET ALONG WITH OTHER PEOPLE: 1
1. LITTLE INTEREST OR PLEASURE IN DOING THINGS: 1
3. TROUBLE FALLING OR STAYING ASLEEP: 0
SUM OF ALL RESPONSES TO PHQ QUESTIONS 1-9: 7
SUM OF ALL RESPONSES TO PHQ QUESTIONS 1-9: 7
4. FEELING TIRED OR HAVING LITTLE ENERGY: 1
7. TROUBLE CONCENTRATING ON THINGS, SUCH AS READING THE NEWSPAPER OR WATCHING TELEVISION: 1

## 2023-10-01 ASSESSMENT — COLUMBIA-SUICIDE SEVERITY RATING SCALE - C-SSRS
6. HAVE YOU EVER DONE ANYTHING, STARTED TO DO ANYTHING, OR PREPARED TO DO ANYTHING TO END YOUR LIFE?: NO
2. HAVE YOU ACTUALLY HAD ANY THOUGHTS OF KILLING YOURSELF?: NO
1. WITHIN THE PAST MONTH, HAVE YOU WISHED YOU WERE DEAD OR WISHED YOU COULD GO TO SLEEP AND NOT WAKE UP?: NO

## 2023-10-01 ASSESSMENT — LIFESTYLE VARIABLES
HOW OFTEN DO YOU HAVE A DRINK CONTAINING ALCOHOL: NEVER
HOW MANY STANDARD DRINKS CONTAINING ALCOHOL DO YOU HAVE ON A TYPICAL DAY: PATIENT DOES NOT DRINK

## 2023-12-15 ENCOUNTER — OUTSIDE SERVICES (OUTPATIENT)
Dept: PRIMARY CARE CLINIC | Age: 88
End: 2023-12-15

## 2023-12-15 DIAGNOSIS — E11.8 TYPE 2 DIABETES MELLITUS WITH COMPLICATION, WITHOUT LONG-TERM CURRENT USE OF INSULIN (HCC): ICD-10-CM

## 2023-12-15 DIAGNOSIS — U07.1 COVID-19: Primary | ICD-10-CM

## 2023-12-16 PROBLEM — U07.1 COVID-19: Status: ACTIVE | Noted: 2023-12-16

## 2023-12-16 NOTE — PROGRESS NOTES
Rosalba Cowart is a 82 y.o. female being seen for her  requested  follow up. Location of visit: [facility]    Visit Date:  12/15/2023    Reason for Visit:    Chief Complaint   Patient presents with    Concern For COVID-19        HPI   Resident tested positive for Covid. She is short of breath and has a non productive cough. Appetite is poor. She is weak. Blood sugars better controlled. Review of Systems   Constitutional:  Positive for appetite change and fatigue. Negative for chills and fever. HENT:  Positive for congestion. Negative for sinus pressure and sore throat. Respiratory:  Positive for cough and shortness of breath. Cardiovascular:  Negative for chest pain and leg swelling. Gastrointestinal:  Negative for constipation, diarrhea and nausea. Genitourinary:  Negative for difficulty urinating and dysuria. Musculoskeletal:  Positive for gait problem. Negative for back pain. Neurological:  Positive for weakness. Negative for dizziness and headaches. Psychiatric/Behavioral:  Positive for confusion. Negative for dysphoric mood and sleep disturbance. The patient is not nervous/anxious. Physical Exam  Vitals and nursing note reviewed. Constitutional:       Appearance: She is obese. HENT:      Head: Normocephalic and atraumatic. Right Ear: External ear normal.      Left Ear: External ear normal.   Cardiovascular:      Rate and Rhythm: Normal rate and regular rhythm. Heart sounds: Normal heart sounds. Pulmonary:      Effort: Pulmonary effort is normal.      Breath sounds: Normal breath sounds. Abdominal:      General: Bowel sounds are normal.      Palpations: Abdomen is soft. Tenderness: There is no abdominal tenderness. Neurological:      Mental Status: She is alert. She is disoriented. Motor: Weakness present.       Gait: Gait abnormal.   Psychiatric:         Mood and Affect: Mood normal.        Allergies   Allergen Reactions    Pcn

## 2023-12-29 ENCOUNTER — OUTSIDE SERVICES (OUTPATIENT)
Dept: PRIMARY CARE CLINIC | Age: 88
End: 2023-12-29

## 2023-12-29 DIAGNOSIS — E11.8 TYPE 2 DIABETES MELLITUS WITH COMPLICATION, WITHOUT LONG-TERM CURRENT USE OF INSULIN (HCC): ICD-10-CM

## 2023-12-29 DIAGNOSIS — R31.9 URINARY TRACT INFECTION WITH HEMATURIA, SITE UNSPECIFIED: Primary | ICD-10-CM

## 2023-12-29 DIAGNOSIS — N39.0 URINARY TRACT INFECTION WITH HEMATURIA, SITE UNSPECIFIED: Primary | ICD-10-CM

## 2023-12-29 DIAGNOSIS — Z86.73 HISTORY OF CVA (CEREBROVASCULAR ACCIDENT): ICD-10-CM

## 2023-12-30 NOTE — PROGRESS NOTES
normal.      Breath sounds: Normal breath sounds. Abdominal:      General: Bowel sounds are normal.      Palpations: Abdomen is soft. Musculoskeletal:      Comments: Left-sided weakness   Skin:     General: Skin is warm and dry. Neurological:      Mental Status: She is alert. She is disoriented. Motor: Weakness present. Gait: Gait abnormal.   Psychiatric:         Mood and Affect: Mood normal.         Behavior: Behavior normal.          Allergies   Allergen Reactions    Pcn [Penicillins] Shortness Of Breath    Demerol     Effexor [Venlafaxine] Other (See Comments)     Hallucinations     Lisinopril      Possible angioedema seen as supraglottitis    Macrobid [Nitrofurantoin Monohydrate Macrocrystals]      Wheezing, tight chest        Past Medical History:   Diagnosis Date    Diverticulosis     Hyperlipidemia     Hypertension     Hypokalemia     Iron deficiency anemia     Menopause     s/p hysterectomy    Osteoarthritis     bilat hands, hips, knees    S/P partial colectomy 2/2002    Stroke ([address])     Thrombocytosis     Type II or unspecified type diabetes mellitus without mention of complication, not stated as uncontrolled 02/2007    Unspecified sleep apnea 08/2004    uses bipap    UTI (lower urinary tract infection)     re-curring      ASSESSMENT:     Diagnosis Orders   1. Urinary tract infection with hematuria, site unspecified        2. Type 2 diabetes mellitus with complication, without long-term current use of insulin ([address])        3. History of CVA (cerebrovascular accident)           Plan:  1. Complete cefuroxime as ordered for UTI. 2.  Fingerstick blood sugar 2 times per week. 3.  Advance therapy as tolerated.

## 2024-01-02 ENCOUNTER — OUTSIDE SERVICES (OUTPATIENT)
Dept: PRIMARY CARE CLINIC | Age: 89
End: 2024-01-02

## 2024-01-02 DIAGNOSIS — R41.82 ALTERED MENTAL STATUS, UNSPECIFIED ALTERED MENTAL STATUS TYPE: ICD-10-CM

## 2024-01-02 DIAGNOSIS — N30.00 ACUTE CYSTITIS WITHOUT HEMATURIA: Primary | ICD-10-CM

## 2024-01-02 NOTE — PROGRESS NOTES
Yajaira Seals is a 88 y.o. female being seen for her weekly follow up.  Location of visit: [facility]    HPI:  New today:Pt has been more confused recently.  Being treated for UTI, but does not seem to be much better.         Review of Systems   Unable to perform ROS: Mental status change          Physical Exam  Vitals reviewed.   Constitutional:       General: She is not in acute distress.  HENT:      Head: Normocephalic and atraumatic.      Nose: No congestion or rhinorrhea.   Eyes:      General:         Right eye: No discharge.         Left eye: No discharge.   Cardiovascular:      Rate and Rhythm: Normal rate and regular rhythm.   Pulmonary:      Effort: Pulmonary effort is normal. No respiratory distress.      Breath sounds: Normal breath sounds.   Abdominal:      Palpations: Abdomen is soft.      Tenderness: There is no abdominal tenderness.   Musculoskeletal:      Right lower leg: No edema.      Left lower leg: No edema.   Skin:     General: Skin is warm and dry.   Neurological:      Mental Status: She is alert. She is disoriented.      Comments: Unable to answer questions appropriately          ASSESSMENT:   Diagnosis Orders   1. Acute cystitis without hematuria      recheck urine culture as she has een on abx for almost a week and continues to be confused.       2. Altered mental status, unspecified altered mental status type      Pt has been more confused and does not seem to be improving with abx.  Will recheck urine culture. May need further testing.           PLAN:    1. Acute cystitis without hematuria  Comments:  recheck urine culture as she has een on abx for almost a week and continues to be confused.   2. Altered mental status, unspecified altered mental status type  Comments:  Pt has been more confused and does not seem to be improving with abx.  Will recheck urine culture. May need further testing.

## 2024-01-09 ENCOUNTER — OUTSIDE SERVICES (OUTPATIENT)
Dept: PRIMARY CARE CLINIC | Age: 89
End: 2024-01-09

## 2024-01-09 DIAGNOSIS — I10 ESSENTIAL HYPERTENSION: ICD-10-CM

## 2024-01-09 DIAGNOSIS — R11.2 NAUSEA AND VOMITING, UNSPECIFIED VOMITING TYPE: Primary | ICD-10-CM

## 2024-01-09 DIAGNOSIS — M54.6 ACUTE THORACIC BACK PAIN, UNSPECIFIED BACK PAIN LATERALITY: ICD-10-CM

## 2024-01-09 DIAGNOSIS — R20.0 FACIAL NUMBNESS: ICD-10-CM

## 2024-01-09 DIAGNOSIS — H10.30 ACUTE CONJUNCTIVITIS, UNSPECIFIED ACUTE CONJUNCTIVITIS TYPE, UNSPECIFIED LATERALITY: ICD-10-CM

## 2024-01-09 ASSESSMENT — ENCOUNTER SYMPTOMS
SHORTNESS OF BREATH: 1
VOMITING: 1
NAUSEA: 1
BACK PAIN: 1
COUGH: 0
DIARRHEA: 0

## 2024-01-09 NOTE — PROGRESS NOTES
Mental Status: She is alert and oriented to person, place, and time.   Psychiatric:         Mood and Affect: Mood normal.         Behavior: Behavior normal.         Thought Content: Thought content normal.          ASSESSMENT:   Diagnosis Orders   1. Nausea and vomiting, unspecified vomiting type        2. Acute thoracic back pain, unspecified back pain laterality        3. Facial numbness        4. Essential hypertension        5. Acute conjunctivitis, unspecified acute conjunctivitis type, unspecified laterality      garamycin drops ordered          PLAN:    1. Nausea and vomiting, unspecified vomiting type  2. Acute thoracic back pain, unspecified back pain laterality  3. Facial numbness  4. Essential hypertension  Assessment & Plan:   BP is good today, in spite of vomiting, etc.    5. Acute conjunctivitis, unspecified acute conjunctivitis type, unspecified laterality  Comments:  garamycin drops ordered   Vitals are okay, but pt continues to c/o new symptoms.  Unable to get EKG or labs stat enough to be helpful.  Even though pt is a DNRCC code status, family would like her sent out to be checked for MI or another possible CVA.

## 2024-01-16 ENCOUNTER — OUTSIDE SERVICES (OUTPATIENT)
Dept: PRIMARY CARE CLINIC | Age: 89
End: 2024-01-16

## 2024-01-16 DIAGNOSIS — R53.83 OTHER FATIGUE: ICD-10-CM

## 2024-01-16 DIAGNOSIS — F33.9 RECURRENT MAJOR DEPRESSIVE DISORDER, REMISSION STATUS UNSPECIFIED (HCC): ICD-10-CM

## 2024-01-16 DIAGNOSIS — H10.30 ACUTE CONJUNCTIVITIS, UNSPECIFIED ACUTE CONJUNCTIVITIS TYPE, UNSPECIFIED LATERALITY: Primary | ICD-10-CM

## 2024-01-16 DIAGNOSIS — I10 ESSENTIAL HYPERTENSION: ICD-10-CM

## 2024-01-16 ASSESSMENT — ENCOUNTER SYMPTOMS
NAUSEA: 0
DIARRHEA: 0
SHORTNESS OF BREATH: 0
COUGH: 0
VOMITING: 0

## 2024-01-16 NOTE — PROGRESS NOTES
Yajaira Seals is a 88 y.o. female being seen for her weekly follow up.  Location of visit: [facility]    HPI:  New today:pt c/o her eye still bothering her.  Also c/o being really tired.  Sleeps all afternoon and then all night as well.          Review of Systems   Constitutional:  Positive for fatigue. Negative for activity change, appetite change, chills, diaphoresis and fever.   HENT:  Negative for congestion.    Respiratory:  Negative for cough and shortness of breath.    Cardiovascular:  Negative for chest pain and palpitations.   Gastrointestinal:  Negative for diarrhea, nausea and vomiting.   Genitourinary:  Negative for hematuria.   Musculoskeletal:  Negative for arthralgias and myalgias.   Skin:  Negative for rash.   Neurological:  Negative for weakness and headaches.   Psychiatric/Behavioral:  Negative for agitation, dysphoric mood and sleep disturbance. The patient is not nervous/anxious.           Physical Exam  Vitals reviewed.   Constitutional:       General: She is not in acute distress.  HENT:      Head: Normocephalic and atraumatic.      Nose: No congestion or rhinorrhea.   Eyes:      General:         Right eye: No discharge.         Left eye: Discharge (with some conjunctival eryth) present.  Cardiovascular:      Rate and Rhythm: Normal rate and regular rhythm.   Pulmonary:      Effort: Pulmonary effort is normal. No respiratory distress.      Breath sounds: Normal breath sounds.   Abdominal:      Palpations: Abdomen is soft.      Tenderness: There is no abdominal tenderness.   Musculoskeletal:      Right lower leg: No edema.      Left lower leg: No edema.   Skin:     General: Skin is warm and dry.   Neurological:      Mental Status: She is alert. Mental status is at baseline.          ASSESSMENT:   Diagnosis Orders   1. Acute conjunctivitis, unspecified acute conjunctivitis type, unspecified laterality      Left eye- has been getting Refresh bid for 10 days as well as the garamycin with no

## 2024-01-16 NOTE — ASSESSMENT & PLAN NOTE
Pt complaining of being really tired.  Could be her meds, but will check labs again to make sure.     74

## 2024-01-26 ENCOUNTER — OUTSIDE SERVICES (OUTPATIENT)
Dept: PRIMARY CARE CLINIC | Age: 89
End: 2024-01-26

## 2024-01-26 DIAGNOSIS — N30.01 ACUTE CYSTITIS WITH HEMATURIA: ICD-10-CM

## 2024-01-26 DIAGNOSIS — R53.83 OTHER FATIGUE: ICD-10-CM

## 2024-01-26 DIAGNOSIS — E11.8 TYPE 2 DIABETES MELLITUS WITH COMPLICATION, WITHOUT LONG-TERM CURRENT USE OF INSULIN (HCC): Primary | ICD-10-CM

## 2024-01-27 ASSESSMENT — ENCOUNTER SYMPTOMS
NAUSEA: 0
RHINORRHEA: 0
SORE THROAT: 0
COUGH: 0
WHEEZING: 0
SHORTNESS OF BREATH: 0
ABDOMINAL PAIN: 0
EYE REDNESS: 0

## 2024-01-27 NOTE — PROGRESS NOTES
Yajaira Seals is a 88 y.o. female being seen for her weekly follow up.  Location of visit: [facility]    Visit Date: January 26, 2024    Reason for Visit:    Chief Complaint   Patient presents with    Diabetes        Diabetes  Hypoglycemia symptoms include confusion. Pertinent negatives for hypoglycemia include no dizziness or headaches. Associated symptoms include fatigue and weakness. Pertinent negatives for diabetes include no chest pain.      Resident continues to have fatigue and confusion.  She denies pain.  Last lab work showed kidney function was down a little bit, therefore metformin discontinued.  New order for fasting blood sugars daily to monitor blood sugars/diabetes.  Urine positive for infection started on Macrobid.    Review of Systems   Constitutional:  Positive for fatigue. Negative for chills and fever.   HENT:  Negative for nosebleeds, rhinorrhea and sore throat.    Eyes:  Negative for discharge and redness.   Respiratory:  Negative for cough, shortness of breath and wheezing.    Cardiovascular:  Negative for chest pain, palpitations and leg swelling.   Gastrointestinal:  Negative for abdominal pain, constipation, diarrhea, nausea and vomiting.   Genitourinary:  Negative for dysuria and frequency.   Musculoskeletal:  Positive for gait problem. Negative for arthralgias and myalgias.   Neurological:  Positive for weakness. Negative for dizziness, light-headedness and headaches.   Psychiatric/Behavioral:  Positive for confusion. Negative for sleep disturbance.         Physical Exam  Vitals and nursing note reviewed.   Constitutional:       General: She is not in acute distress.     Appearance: She is well-developed. She is not ill-appearing.   HENT:      Head: Normocephalic and atraumatic.      Right Ear: External ear normal.      Left Ear: External ear normal.   Eyes:      General: No scleral icterus.        Right eye: No discharge.         Left eye: No discharge.      Conjunctiva/sclera:

## 2024-04-05 ENCOUNTER — OUTSIDE SERVICES (OUTPATIENT)
Dept: PRIMARY CARE CLINIC | Age: 89
End: 2024-04-05
Payer: COMMERCIAL

## 2024-04-05 VITALS
BODY MASS INDEX: 29.16 KG/M2 | OXYGEN SATURATION: 96 % | SYSTOLIC BLOOD PRESSURE: 131 MMHG | DIASTOLIC BLOOD PRESSURE: 69 MMHG | TEMPERATURE: 98.1 F | WEIGHT: 164.6 LBS | HEART RATE: 53 BPM | HEIGHT: 63 IN | RESPIRATION RATE: 18 BRPM

## 2024-04-05 DIAGNOSIS — E11.8 TYPE 2 DIABETES MELLITUS WITH COMPLICATION, WITHOUT LONG-TERM CURRENT USE OF INSULIN (HCC): ICD-10-CM

## 2024-04-05 DIAGNOSIS — Z00.00 ENCOUNTER FOR SUBSEQUENT ANNUAL WELLNESS VISIT (AWV) IN MEDICARE PATIENT: Primary | ICD-10-CM

## 2024-04-05 DIAGNOSIS — Z00.8 ENCOUNTER FOR OTHER GENERAL EXAMINATION: ICD-10-CM

## 2024-04-05 DIAGNOSIS — Z86.73 HISTORY OF CVA (CEREBROVASCULAR ACCIDENT): ICD-10-CM

## 2024-04-05 PROBLEM — I50.32 CHRONIC HEART FAILURE WITH PRESERVED EJECTION FRACTION (HFPEF) (HCC): Status: ACTIVE | Noted: 2021-10-05

## 2024-04-05 PROCEDURE — G0439 PPPS, SUBSEQ VISIT: HCPCS | Performed by: NURSE PRACTITIONER

## 2024-04-05 ASSESSMENT — PATIENT HEALTH QUESTIONNAIRE - PHQ9
SUM OF ALL RESPONSES TO PHQ9 QUESTIONS 1 & 2: 3
4. FEELING TIRED OR HAVING LITTLE ENERGY: MORE THAN HALF THE DAYS
1. LITTLE INTEREST OR PLEASURE IN DOING THINGS: NOT AT ALL
5. POOR APPETITE OR OVEREATING: SEVERAL DAYS
3. TROUBLE FALLING OR STAYING ASLEEP: SEVERAL DAYS
SUM OF ALL RESPONSES TO PHQ QUESTIONS 1-9: 8
7. TROUBLE CONCENTRATING ON THINGS, SUCH AS READING THE NEWSPAPER OR WATCHING TELEVISION: SEVERAL DAYS
9. THOUGHTS THAT YOU WOULD BE BETTER OFF DEAD, OR OF HURTING YOURSELF: NOT AT ALL
SUM OF ALL RESPONSES TO PHQ QUESTIONS 1-9: 8
2. FEELING DOWN, DEPRESSED OR HOPELESS: NEARLY EVERY DAY
10. IF YOU CHECKED OFF ANY PROBLEMS, HOW DIFFICULT HAVE THESE PROBLEMS MADE IT FOR YOU TO DO YOUR WORK, TAKE CARE OF THINGS AT HOME, OR GET ALONG WITH OTHER PEOPLE: SOMEWHAT DIFFICULT
8. MOVING OR SPEAKING SO SLOWLY THAT OTHER PEOPLE COULD HAVE NOTICED. OR THE OPPOSITE, BEING SO FIGETY OR RESTLESS THAT YOU HAVE BEEN MOVING AROUND A LOT MORE THAN USUAL: NOT AT ALL
6. FEELING BAD ABOUT YOURSELF - OR THAT YOU ARE A FAILURE OR HAVE LET YOURSELF OR YOUR FAMILY DOWN: NOT AT ALL
SUM OF ALL RESPONSES TO PHQ QUESTIONS 1-9: 8
SUM OF ALL RESPONSES TO PHQ QUESTIONS 1-9: 8

## 2024-04-05 ASSESSMENT — LIFESTYLE VARIABLES
HOW MANY STANDARD DRINKS CONTAINING ALCOHOL DO YOU HAVE ON A TYPICAL DAY: PATIENT DOES NOT DRINK
HOW OFTEN DO YOU HAVE A DRINK CONTAINING ALCOHOL: NEVER

## 2024-04-06 NOTE — PROGRESS NOTES
Interventions:  Encouraged to go to activities    Social Isolation Interventions:  Encouraged to participate in activity      Activity, Diet, and Weight:  On average, how many days per week do you engage in moderate to strenuous exercise (like a brisk walk)?: 0 days  On average, how many minutes do you engage in exercise at this level?: 0 min    Do you eat balanced/healthy meals regularly?: Yes    Body mass index is 29.16 kg/m².      Inactivity Interventions:  Encouraged to do upper body stretching and lower body stretching.  Vigorous strenuous activity is not realistic for this patient          Vision Screen:  Do you have difficulty driving, watching TV, or doing any of your daily activities because of your eyesight?: No  Have you had an eye exam within the past year?: (!) No  No results found.    Interventions:    Encouraged to have family make appointment.     ADL's:   Patient reports needing help with:  Select all that apply: (!) Dressing, Grooming, Bathing, Toileting  Select all that apply: (!) Laundry, Housekeeping, Banking/Finances, Shopping, Food Preparation, Transportation, Taking Medications  Interventions:  She is appropriate for assisted living which is where she is now living.  Nurses do administer medications which is appropriate                  Objective   Vitals:    04/05/24 2215   BP: 131/69   Pulse: 53   Resp: 18   Temp: 98.1 °F (36.7 °C)   SpO2: 96%   Weight: 74.7 kg (164 lb 9.6 oz)   Height: 1.6 m (5' 3\")      Body mass index is 29.16 kg/m².        General Appearance: well-developed and well nourished, in no acute distress, and alert  Skin: warm and dry, no rash or erythema  Head: normocephalic and atraumatic  Eyes: pupils equal, round, and reactive to light, extraocular eye movements intact, conjunctivae normal  ENT: bilateral external ear normal, hearing grossly normal bilaterally, and sinuses non-tender  Neck: neck supple and non tender without mass, no thyromegaly or thyroid nodules, no

## 2024-06-14 ENCOUNTER — OUTSIDE SERVICES (OUTPATIENT)
Dept: PRIMARY CARE CLINIC | Age: 89
End: 2024-06-14

## 2024-06-14 DIAGNOSIS — K52.9 GASTROENTERITIS: Primary | ICD-10-CM

## 2024-06-15 ASSESSMENT — ENCOUNTER SYMPTOMS
CONSTIPATION: 0
NAUSEA: 1
ABDOMINAL PAIN: 1
DIARRHEA: 0
SINUS PRESSURE: 0

## 2024-06-15 NOTE — PROGRESS NOTES
Yajaira Seals is a 88 y.o. female being seen for her  requested  follow up.  Location of visit: [facility] [place]    Visit Date:  6/14/2024    Reason for Visit:    Chief Complaint   Patient presents with    Nausea        HPI   Resident had some nausea and upset stomach last night and early this morning. She states it is better now. She did not eat much for breakfast or lunch.  She states she just feels like she needs to rest.    Review of Systems   Constitutional:  Positive for appetite change and fatigue. Negative for chills and fever.   HENT:  Negative for congestion and sinus pressure.    Cardiovascular:  Negative for chest pain and palpitations.   Gastrointestinal:  Positive for abdominal pain and nausea. Negative for constipation and diarrhea.   Musculoskeletal:  Positive for arthralgias.   Neurological:  Negative for dizziness and headaches.   Psychiatric/Behavioral:  Negative for sleep disturbance. The patient is not nervous/anxious.         Physical Exam  Vitals and nursing note reviewed.   Constitutional:       Appearance: She is obese.   Cardiovascular:      Rate and Rhythm: Normal rate and regular rhythm.      Heart sounds: Normal heart sounds.   Pulmonary:      Effort: Pulmonary effort is normal.      Breath sounds: Normal breath sounds.   Abdominal:      General: Bowel sounds are normal.      Palpations: Abdomen is soft.      Tenderness: There is no abdominal tenderness.   Musculoskeletal:      Right lower leg: No edema.      Left lower leg: No edema.   Skin:     General: Skin is warm and dry.   Neurological:      Mental Status: She is alert.      Motor: Weakness present.   Psychiatric:         Mood and Affect: Mood normal.         Behavior: Behavior normal.          Allergies   Allergen Reactions    Pcn [Penicillins] Shortness Of Breath    Demerol     Effexor [Venlafaxine] Other (See Comments)     Hallucinations     Lisinopril      Possible angioedema seen as supraglottitis    Macrobid

## 2024-12-27 ENCOUNTER — OUTSIDE SERVICES (OUTPATIENT)
Dept: PRIMARY CARE CLINIC | Age: 89
End: 2024-12-27

## 2024-12-27 DIAGNOSIS — I10 ESSENTIAL HYPERTENSION: ICD-10-CM

## 2024-12-27 DIAGNOSIS — E03.9 HYPOTHYROIDISM, UNSPECIFIED TYPE: ICD-10-CM

## 2024-12-27 DIAGNOSIS — E11.8 TYPE 2 DIABETES MELLITUS WITH COMPLICATION, WITHOUT LONG-TERM CURRENT USE OF INSULIN (HCC): ICD-10-CM

## 2024-12-27 DIAGNOSIS — U07.1 COVID: Primary | ICD-10-CM

## 2024-12-27 ASSESSMENT — ENCOUNTER SYMPTOMS
SORE THROAT: 0
COUGH: 1
VOMITING: 0
RHINORRHEA: 0
DIARRHEA: 0
EYE REDNESS: 0
NAUSEA: 0
CONSTIPATION: 0
ABDOMINAL PAIN: 0
WHEEZING: 0
EYE DISCHARGE: 0
SHORTNESS OF BREATH: 0

## 2024-12-27 NOTE — PROGRESS NOTES
4. Hypothyroidism, unspecified type             Plan:  Resident has labs ordered for January 11 yearly.  Please add hemoglobin A1c to already ordered CBC with differential, CMP, TSH and free T4.    Continue COVID supportive care.    Continue current medications as ordered.    Blood pressure is trending a little bit high.  If trend continues may need to adjust blood pressure medication.

## 2025-03-21 ENCOUNTER — OUTSIDE SERVICES (OUTPATIENT)
Dept: PRIMARY CARE CLINIC | Age: 89
End: 2025-03-21
Payer: COMMERCIAL

## 2025-03-21 DIAGNOSIS — Z86.73 HISTORY OF CVA (CEREBROVASCULAR ACCIDENT): ICD-10-CM

## 2025-03-21 DIAGNOSIS — R52 ACUTE PAIN: ICD-10-CM

## 2025-03-21 DIAGNOSIS — I10 ESSENTIAL HYPERTENSION: ICD-10-CM

## 2025-03-21 DIAGNOSIS — Z00.00 MEDICARE ANNUAL WELLNESS VISIT, SUBSEQUENT: Primary | ICD-10-CM

## 2025-03-21 DIAGNOSIS — E11.8 TYPE 2 DIABETES MELLITUS WITH COMPLICATION, WITHOUT LONG-TERM CURRENT USE OF INSULIN (HCC): ICD-10-CM

## 2025-03-21 PROCEDURE — G0439 PPPS, SUBSEQ VISIT: HCPCS | Performed by: NURSE PRACTITIONER

## 2025-03-21 PROCEDURE — 1123F ACP DISCUSS/DSCN MKR DOCD: CPT | Performed by: NURSE PRACTITIONER

## 2025-03-22 VITALS
DIASTOLIC BLOOD PRESSURE: 66 MMHG | HEIGHT: 63 IN | TEMPERATURE: 98 F | HEART RATE: 51 BPM | OXYGEN SATURATION: 97 % | WEIGHT: 176.8 LBS | RESPIRATION RATE: 20 BRPM | SYSTOLIC BLOOD PRESSURE: 142 MMHG | BODY MASS INDEX: 31.33 KG/M2

## 2025-03-22 ASSESSMENT — PATIENT HEALTH QUESTIONNAIRE - PHQ9
6. FEELING BAD ABOUT YOURSELF - OR THAT YOU ARE A FAILURE OR HAVE LET YOURSELF OR YOUR FAMILY DOWN: NOT AT ALL
10. IF YOU CHECKED OFF ANY PROBLEMS, HOW DIFFICULT HAVE THESE PROBLEMS MADE IT FOR YOU TO DO YOUR WORK, TAKE CARE OF THINGS AT HOME, OR GET ALONG WITH OTHER PEOPLE: SOMEWHAT DIFFICULT
9. THOUGHTS THAT YOU WOULD BE BETTER OFF DEAD, OR OF HURTING YOURSELF: NOT AT ALL
SUM OF ALL RESPONSES TO PHQ QUESTIONS 1-9: 4
1. LITTLE INTEREST OR PLEASURE IN DOING THINGS: SEVERAL DAYS
SUM OF ALL RESPONSES TO PHQ QUESTIONS 1-9: 4
SUM OF ALL RESPONSES TO PHQ QUESTIONS 1-9: 4
5. POOR APPETITE OR OVEREATING: NOT AT ALL
7. TROUBLE CONCENTRATING ON THINGS, SUCH AS READING THE NEWSPAPER OR WATCHING TELEVISION: SEVERAL DAYS
2. FEELING DOWN, DEPRESSED OR HOPELESS: SEVERAL DAYS
3. TROUBLE FALLING OR STAYING ASLEEP: NOT AT ALL
SUM OF ALL RESPONSES TO PHQ QUESTIONS 1-9: 4
8. MOVING OR SPEAKING SO SLOWLY THAT OTHER PEOPLE COULD HAVE NOTICED. OR THE OPPOSITE, BEING SO FIGETY OR RESTLESS THAT YOU HAVE BEEN MOVING AROUND A LOT MORE THAN USUAL: NOT AT ALL
4. FEELING TIRED OR HAVING LITTLE ENERGY: SEVERAL DAYS

## 2025-03-22 NOTE — PATIENT INSTRUCTIONS
back, neck, jaw, or upper belly or in one or both shoulders or arms.     Lightheadedness or sudden weakness.     A fast or irregular heartbeat.   After you call 911, the  may tell you to chew 1 adult-strength or 2 to 4 low-dose aspirin. Wait for an ambulance. Do not try to drive yourself.  Watch closely for changes in your health, and be sure to contact your doctor if you have any problems.  Where can you learn more?  Go to https://www.[employer].net/patientEd and enter F075 to learn more about \"A Healthy Heart: Care Instructions.\"  Current as of: July 31, 2024  Content Version: 14.4  © 8598-4220 [employer].   Care instructions adapted under license by [employer]. If you have questions about a medical condition or this instruction, always ask your healthcare professional. [employer], disclaims any warranty or liability for your use of this information.    Personalized Preventive Plan for Yajaira Seals - 3/21/2025  Medicare offers a range of preventive health benefits. Some of the tests and screenings are paid in full while other may be subject to a deductible, co-insurance, and/or copay.  Some of these benefits include a comprehensive review of your medical history including lifestyle, illnesses that may run in your family, and various assessments and screenings as appropriate.  After reviewing your medical record and screening and assessments performed today your provider may have ordered immunizations, labs, imaging, and/or referrals for you.  A list of these orders (if applicable) as well as your Preventive Care list are included within your After Visit Summary for your review.

## 2025-03-22 NOTE — PROGRESS NOTES
Medicare Annual Wellness Visit    Yajaira Seals is here for Medicare AWV    Assessment & Plan   Medicare annual wellness visit, subsequent  Type 2 diabetes mellitus with complication, without long-term current use of insulin (HCC)  Essential hypertension  Acute pain  History of CVA (cerebrovascular accident)       Return in 3 months (on 6/21/2025).     Subjective   The following acute and/or chronic problems were also addressed today:  She is having some knee pain due to arthritis.  She is has acetaminophen available for pain    She has some depression  She is taking duloxetine.     She has some confusion.   Long term memory okay.  Short term memory is poor.  History of CVA      Patient's complete Health Risk Assessment and screening values have been reviewed and are found in Flowsheets. The following problems were reviewed today and where indicated follow up appointments were made and/or referrals ordered.    Positive Risk Factor Screenings with Interventions:             General HRA Questions:  Select all that apply: (!) New or Increased Pain, New or Increased Fatigue, Loneliness, Stress  Interventions - Pain:  Use acetaminophen as needed,  Interventions Fatigue:  Encouraged to increase activity.   Interventions - Loneliness:  Encouraged to go to activities   Interventions - Stress:  Children manage affairs due to confusion.       Inactivity:  On average, how many days per week do you engage in moderate to strenuous exercise (like a brisk walk)?: 0 days (!) Abnormal  On average, how many minutes do you engage in exercise at this level?: 0 min  Interventions:  Encouraged to attend activities      Abnormal BMI (obese):  Body mass index is 31.32 kg/m². (!) Abnormal  Interventions:  Encouraged to attend exercise classes.         Vision Screen:  Do you have difficulty driving, watching TV, or doing any of your daily activities because of your eyesight?: No  Have you had an eye exam within the past year?: (!)

## 2025-06-20 ENCOUNTER — OUTSIDE SERVICES (OUTPATIENT)
Dept: PRIMARY CARE CLINIC | Age: 89
End: 2025-06-20

## 2025-06-20 DIAGNOSIS — I10 ESSENTIAL HYPERTENSION: ICD-10-CM

## 2025-06-20 DIAGNOSIS — E11.8 TYPE 2 DIABETES MELLITUS WITH COMPLICATION, WITHOUT LONG-TERM CURRENT USE OF INSULIN (HCC): Primary | ICD-10-CM

## 2025-06-20 DIAGNOSIS — E03.9 HYPOTHYROIDISM, UNSPECIFIED TYPE: ICD-10-CM

## 2025-06-20 ASSESSMENT — ENCOUNTER SYMPTOMS
SHORTNESS OF BREATH: 0
DIARRHEA: 0
SINUS PAIN: 0
SINUS PRESSURE: 0
NAUSEA: 0
COUGH: 0

## 2025-06-21 NOTE — PROGRESS NOTES
Yajaira Seals is a 89 y.o. female being seen for her 3 month follow up.  Location of visit: [facility] [place]    Visit Date:  6/20/2025     Reason for Visit:    Chief Complaint   Patient presents with    Diabetes        Diabetes  Hypoglycemia symptoms include confusion. Pertinent negatives for hypoglycemia include no nervousness/anxiousness. Associated symptoms include fatigue and weakness. Pertinent negatives for diabetes include no chest pain.      Resident reports she has some right knee pain and right shoulder pain at times.  She relies on her right side for more activities due to weakness on left side.  In fact left arm is nearly flaccid.    Hemoglobin A1c check was 6.5.  TSH was normal at 2.34.    She relates that she does sleep a lot.  Review of Systems   Constitutional:  Positive for fatigue. Negative for activity change, appetite change, chills and fever.   HENT:  Negative for congestion, sinus pressure and sinus pain.    Respiratory:  Negative for cough and shortness of breath.    Cardiovascular:  Negative for chest pain, palpitations and leg swelling.   Gastrointestinal:  Negative for diarrhea and nausea.   Genitourinary:  Negative for difficulty urinating and dysuria.   Musculoskeletal:  Positive for arthralgias and gait problem.   Skin:  Negative for wound.   Neurological:  Positive for weakness.   Psychiatric/Behavioral:  Positive for confusion. Negative for dysphoric mood and sleep disturbance. The patient is not nervous/anxious.         Physical Exam  Vitals and nursing note reviewed.   Constitutional:       Appearance: She is obese.   HENT:      Head: Normocephalic and atraumatic.      Right Ear: External ear normal.      Left Ear: External ear normal.   Eyes:      General: No scleral icterus.     Extraocular Movements: Extraocular movements intact.      Conjunctiva/sclera: Conjunctivae normal.   Cardiovascular:      Rate and Rhythm: Normal rate and regular rhythm.      Heart sounds: Normal